# Patient Record
Sex: MALE | Race: WHITE | NOT HISPANIC OR LATINO | Employment: OTHER | URBAN - NONMETROPOLITAN AREA
[De-identification: names, ages, dates, MRNs, and addresses within clinical notes are randomized per-mention and may not be internally consistent; named-entity substitution may affect disease eponyms.]

---

## 2017-01-23 RX ORDER — LISINOPRIL 10 MG/1
10 TABLET ORAL DAILY
COMMUNITY
End: 2017-11-26 | Stop reason: SDUPTHER

## 2017-01-23 RX ORDER — PRAVASTATIN SODIUM 20 MG
20 TABLET ORAL DAILY
COMMUNITY
End: 2017-01-31

## 2017-01-23 RX ORDER — OMEGA-3 FATTY ACIDS/FISH OIL 300-1000MG
CAPSULE ORAL
COMMUNITY
End: 2018-07-03

## 2017-01-23 RX ORDER — METOPROLOL TARTRATE 50 MG/1
50 TABLET, FILM COATED ORAL 2 TIMES DAILY
COMMUNITY
End: 2017-01-31 | Stop reason: DRUGHIGH

## 2017-01-23 RX ORDER — UBIDECARENONE 100 MG
100 CAPSULE ORAL DAILY
COMMUNITY

## 2017-01-23 RX ORDER — DIGOXIN 250 MCG
250 TABLET ORAL
COMMUNITY
End: 2017-01-31

## 2017-01-23 RX ORDER — FUROSEMIDE 20 MG/1
20 TABLET ORAL DAILY
COMMUNITY
End: 2017-01-31 | Stop reason: DRUGHIGH

## 2017-01-23 RX ORDER — MELATONIN
1000 DAILY
COMMUNITY
End: 2017-01-31

## 2017-01-31 ENCOUNTER — OFFICE VISIT (OUTPATIENT)
Dept: CARDIOLOGY | Facility: CLINIC | Age: 82
End: 2017-01-31

## 2017-01-31 VITALS
SYSTOLIC BLOOD PRESSURE: 130 MMHG | HEIGHT: 74 IN | HEART RATE: 71 BPM | BODY MASS INDEX: 26.31 KG/M2 | WEIGHT: 205 LBS | DIASTOLIC BLOOD PRESSURE: 70 MMHG

## 2017-01-31 DIAGNOSIS — I50.40 COMBINED SYSTOLIC AND DIASTOLIC CONGESTIVE HEART FAILURE, UNSPECIFIED CONGESTIVE HEART FAILURE CHRONICITY: Primary | ICD-10-CM

## 2017-01-31 DIAGNOSIS — I10 ESSENTIAL HYPERTENSION: ICD-10-CM

## 2017-01-31 DIAGNOSIS — I25.10 CORONARY ARTERY DISEASE INVOLVING NATIVE CORONARY ARTERY OF NATIVE HEART WITHOUT ANGINA PECTORIS: ICD-10-CM

## 2017-01-31 DIAGNOSIS — I48.20 CHRONIC ATRIAL FIBRILLATION (HCC): ICD-10-CM

## 2017-01-31 DIAGNOSIS — E78.49 OTHER HYPERLIPIDEMIA: ICD-10-CM

## 2017-01-31 PROCEDURE — 80053 COMPREHEN METABOLIC PANEL: CPT | Performed by: FAMILY MEDICINE

## 2017-01-31 PROCEDURE — 80061 LIPID PANEL: CPT | Performed by: FAMILY MEDICINE

## 2017-01-31 PROCEDURE — 99212 OFFICE O/P EST SF 10 MIN: CPT | Performed by: INTERNAL MEDICINE

## 2017-01-31 RX ORDER — METOPROLOL TARTRATE 75 MG/1
75 TABLET, FILM COATED ORAL 2 TIMES DAILY
Status: ON HOLD | COMMUNITY
End: 2020-01-01

## 2017-01-31 RX ORDER — FUROSEMIDE 40 MG/1
10 TABLET ORAL DAILY
Status: ON HOLD | COMMUNITY
End: 2020-01-01

## 2017-01-31 RX ORDER — ACETAMINOPHEN 325 MG/1
650 TABLET ORAL EVERY 6 HOURS PRN
COMMUNITY
End: 2020-01-01

## 2017-01-31 NOTE — PROGRESS NOTES
"Subjective    Donnell Cain is a 83 y.o. male.  - fu of afib and ihd and cmo    History of Present Illness     IHD:  No angina and very active for his age. No unusual SOB. Tolerates meds ok. Had CABGX3 '01.    AFIB:  \"That pacer keeps my rhythm perfect\". No bleeding probs with NOAC and is taking correctly. Good stamina without palpitations.Pacer checks ok.    ISC CMO:  Previous RCA and LAD dist MI.  Last ECHO EF '13 was poor but now stamina is good. Has excessive diuresis after Lasix but goes frequently but small amounts - suspect degree of KLEIN    HLD:  Is off statin now and doesn't know why. Needs to ask PCP about this.    The following portions of the patient's history were reviewed and updated as appropriate: allergies, current medications, past family history, past medical history, past social history, past surgical history and problem list.    Patient Active Problem List   Diagnosis   • CAD (coronary artery disease)   • Combined systolic and diastolic congestive heart failure   • SSS (sick sinus syndrome)   • A-fib   • HLD (hyperlipidemia)   • HTN (hypertension)       Allergies   Allergen Reactions   • Barbiturates    • Crestor [Rosuvastatin Calcium]    • Lipitor [Atorvastatin]        Family History   Problem Relation Age of Onset   • Heart disease Brother        Social History     Social History   • Marital status:      Spouse name: N/A   • Number of children: N/A   • Years of education: N/A     Occupational History   • Not on file.     Social History Main Topics   • Smoking status: Former Smoker     Types: Cigarettes     Quit date: 1971   • Smokeless tobacco: Never Used      Comment: Quit smoking age 40   • Alcohol use No   • Drug use: No   • Sexual activity: Defer     Other Topics Concern   • Not on file     Social History Narrative         Current Outpatient Prescriptions:   •  acetaminophen (TYLENOL) 325 MG tablet, Take 650 mg by mouth Every 6 (Six) Hours As Needed for mild pain (1-3)., Disp: , " "Rfl:   •  coenzyme Q10 100 MG capsule, Take 100 mg by mouth Daily., Disp: , Rfl:   •  furosemide (LASIX) 40 MG tablet, Take 40 mg by mouth 2 (Two) Times a Day., Disp: , Rfl:   •  lisinopril (PRINIVIL,ZESTRIL) 10 MG tablet, Take 10 mg by mouth Daily., Disp: , Rfl:   •  Metoprolol Tartrate 75 MG tablet, Take 75 mg by mouth., Disp: , Rfl:   •  Multiple Vitamins-Minerals (MULTIVITAMIN ADULT PO), Take  by mouth., Disp: , Rfl:   •  Omega 3 1000 MG capsule, Take  by mouth., Disp: , Rfl:   •  rivaroxaban (XARELTO) 20 MG tablet, TAKE 1 TABLET DAILY, Disp: , Rfl:     Past Surgical History   Procedure Laterality Date   • Cardiac pacemaker placement     • Tonsillectomy     • Coronary artery bypass graft     • Appendectomy     • Pacemaker implantation       x 3        Review of Systems   Respiratory: Negative for apnea, chest tightness and shortness of breath.    Cardiovascular: Negative for chest pain, palpitations and leg swelling.   Gastrointestinal: Negative for abdominal pain.   Genitourinary: Positive for frequency. Negative for dysuria.   Musculoskeletal: Negative for myalgias.   Neurological: Negative for weakness and light-headedness.   Psychiatric/Behavioral: Negative for sleep disturbance.       Visit Vitals   • /70 (BP Location: Right arm, Patient Position: Sitting, Cuff Size: Adult)   • Pulse 71   • Ht 74\" (188 cm)   • Wt 205 lb (93 kg)   • BMI 26.32 kg/m2     Procedures    Objective   Physical Exam   Constitutional: He is oriented to person, place, and time. He appears well-developed and well-nourished.   HENT:   Head: Normocephalic and atraumatic.   Eyes: EOM are normal. Pupils are equal, round, and reactive to light.   Cardiovascular: Normal rate, regular rhythm, normal heart sounds and intact distal pulses.  Exam reveals no gallop and no friction rub.    No murmur heard.  Pulmonary/Chest: Effort normal and breath sounds normal. No respiratory distress. He has no wheezes. He has no rales.   Musculoskeletal: " He exhibits no edema or tenderness.   Neurological: He is alert and oriented to person, place, and time.   Skin: Skin is warm and dry.   Psychiatric: He has a normal mood and affect.       Assessment/Plan   Donnell was seen today for atrial fibrillation and coronary artery disease.    Diagnoses and all orders for this visit:    Combined systolic and diastolic congestive heart failure, unspecified congestive heart failure chronicity  Comments:  well compensated. Class 2c    Chronic atrial fibrillation  Comments:   ok. NOAC ok. CPT    Other hyperlipidemia  Comments:  will discuss with pcp the rationale of dc of statin    Essential hypertension  Comments:  controlled - cpt    Coronary artery disease involving native coronary artery of native heart without angina pectoris  Comments:  No angina - cpt                 Return in about 18 months (around 7/31/2018).  No orders of the defined types were placed in this encounter.

## 2017-01-31 NOTE — MR AVS SNAPSHOT
Donnell Cain   1/31/2017 10:30 AM   Office Visit    Dept Phone:  [phone]   Encounter #:  [id number]    Provider:  Tramaine Mckeon MD   Department:  [facility]                Your Full Care Plan              Today's Medication Changes          These changes are accurate as of: 1/31/17 10:36 AM.  If you have any questions, ask your nurse or doctor.               Medication(s)that have changed:     furosemide 40 MG tablet   Commonly known as:  LASIX   Take 40 mg by mouth 2 (Two) Times a Day.   What changed:  Another medication with the same name was removed. Continue taking this medication, and follow the directions you see here.   Changed by:  Tramaine Mckeon MD       Metoprolol Tartrate 75 MG tablet   Take 75 mg by mouth.   What changed:  Another medication with the same name was removed. Continue taking this medication, and follow the directions you see here.   Changed by:  Tramaine Mckeon MD         Stop taking medication(s)listed here:     cholecalciferol 1000 UNITS tablet   Commonly known as:  VITAMIN D3   Stopped by:  Tramaine Mckeon MD           digoxin 250 MCG tablet   Commonly known as:  LANOXIN   Stopped by:  Tramaine Mckeon MD           pravastatin 20 MG tablet   Commonly known as:  PRAVACHOL   Stopped by:  Tramaine Mckeon MD                      Your Updated Medication List          This list is accurate as of: 1/31/17 10:36 AM.  Always use your most recent med list.                acetaminophen 325 MG tablet   Commonly known as:  TYLENOL       coenzyme Q10 100 MG capsule       furosemide 40 MG tablet   Commonly known as:  LASIX       lisinopril 10 MG tablet   Commonly known as:  PRINIVIL,ZESTRIL       Metoprolol Tartrate 75 MG tablet       MULTIVITAMIN ADULT PO       Omega 3 1000 MG capsule       XARELTO 20 MG tablet   Generic drug:  rivaroxaban               You Were Diagnosed With        Codes  Comments    Diastolic congestive heart failure, unspecified congestive heart failure chronicity    -  Primary ICD-10-CM: I50.30  ICD-9-CM: 428.30, 428.0     Chronic atrial fibrillation     ICD-10-CM: I48.2  ICD-9-CM: 427.31     Other hyperlipidemia     ICD-10-CM: E78.4  ICD-9-CM: 272.4     Essential hypertension     ICD-10-CM: I10  ICD-9-CM: 401.9       Instructions     None    Patient Instructions History      Upcoming Appointments     Visit Type Date Time Department    FOLLOW UP 2017 10:30 AM [facility]    PACEMAKER CHECK 2017 10:30 AM [facility]      MyChart Signup     [employer] allows you to send messages to your doctor, view your test results, renew your prescriptions, schedule appointments, and more. To sign up, go to [employer] and click on the Sign Up Now link in the New User? box. Enter your [employer] Activation Code exactly as it appears below along with the last four digits of your Social Security Number and your Date of Birth () to complete the sign-up process. If you do not sign up before the expiration date, you must request a new code.    [employer] Activation Code: [email or web address]  Expires: 2017 10:35 AM    If you have questions, you can email [email or web address] or call [phone] to talk to our [employer] staff. Remember, [employer] is NOT to be used for urgent needs. For medical emergencies, dial 911.               Other Info from Your Visit           Your Appointments     2017 10:30 AM CDT   PACEMAKER CHECK with PACEMAKER HEART GRP KYLE   [facility] (--)    [address]  [place] [address]   [phone]            2018 10:00 AM CDT   Follow Up with Tramaine Mckeon MD   [facility] (--)    [address]  [place] [address]   [phone]           Arrive 15 minutes prior to appointment.              Allergies      "Barbiturates      Crestor [Rosuvastatin Calcium]      Lipitor [Atorvastatin]        Reason for Visit     Atrial Fibrillation Year Follow Up     Coronary Artery Disease           Vital Signs     Blood Pressure Pulse Height Weight Body Mass Index Smoking Status    130/70 (BP Location: Right arm, Patient Position: Sitting, Cuff Size: Adult) 71 74\" (188 cm) 205 lb (93 kg) 26.32 kg/m2 Former Smoker      Problems and Diagnoses Noted     Atrial fibrillation (irregular heartbeat)    Heart failure    High cholesterol or triglycerides    High blood pressure        "

## 2017-03-06 RX ORDER — RIVAROXABAN 20 MG/1
TABLET, FILM COATED ORAL
Qty: 90 TABLET | Refills: 2 | Status: SHIPPED | OUTPATIENT
Start: 2017-03-06 | End: 2017-12-22 | Stop reason: SDUPTHER

## 2017-06-20 ENCOUNTER — CLINICAL SUPPORT (OUTPATIENT)
Dept: CARDIOLOGY | Facility: CLINIC | Age: 82
End: 2017-06-20

## 2017-06-20 DIAGNOSIS — I48.20 CHRONIC ATRIAL FIBRILLATION (HCC): ICD-10-CM

## 2017-06-20 DIAGNOSIS — Z95.0 CARDIAC PACEMAKER IN SITU: Primary | ICD-10-CM

## 2017-06-20 PROCEDURE — 93288 INTERROG EVL PM/LDLS PM IP: CPT | Performed by: INTERNAL MEDICINE

## 2017-06-20 NOTE — PROGRESS NOTES
Single Chamber Ventricular Pacemaker Evaluation Report  In office    June 20, 2017    Primary Cardiologist: Mike  : Guidant Model: Advantio  Implant date: November 5, 2014    Reason for evaluation: routine  Indication for pacemaker: chronic a-fib    Measurements  Ventricular sensing - R wave: 24.2 mV  Ventricular threshold: 0.7 V @ 0.4 ms  Ventricular lead impedance: 773 ohms      Diagnostic Data  Paced: 98 %  Other: 1 ventricular high rate- 1 second long, max v rate 215 bpm   meds include xarelto  Battery status: satisfactory    Est 8.5 years       Final Parameters  Mode: VVIR     Lower rate: 70 bpm    Ventricular - Amplitude: 1.2 V Pulse width: 0.4 ms Sensitivity: 2.5 mV   Changes made: none  Conclusions: normal pacemaker function and stable pacing and sensing thresholds    Follow up: 6 months

## 2017-11-28 RX ORDER — LISINOPRIL 10 MG/1
TABLET ORAL
Qty: 90 TABLET | Refills: 0 | Status: SHIPPED | OUTPATIENT
Start: 2017-11-28 | End: 2018-02-25 | Stop reason: SDUPTHER

## 2017-11-28 RX ORDER — FUROSEMIDE 20 MG/1
TABLET ORAL
Qty: 90 TABLET | Refills: 0 | Status: SHIPPED | OUTPATIENT
Start: 2017-11-28 | End: 2018-02-25 | Stop reason: SDUPTHER

## 2017-12-01 RX ORDER — RIVAROXABAN 20 MG/1
TABLET, FILM COATED ORAL
Qty: 90 TABLET | Refills: 2 | OUTPATIENT
Start: 2017-12-01

## 2017-12-19 ENCOUNTER — CLINICAL SUPPORT (OUTPATIENT)
Dept: CARDIOLOGY | Facility: CLINIC | Age: 82
End: 2017-12-19

## 2017-12-19 DIAGNOSIS — I49.5 SSS (SICK SINUS SYNDROME) (HCC): ICD-10-CM

## 2017-12-19 PROCEDURE — 93288 INTERROG EVL PM/LDLS PM IP: CPT | Performed by: PHYSICIAN ASSISTANT

## 2018-01-02 NOTE — PROGRESS NOTES
Single Chamber Ventricular Pacemaker Evaluation Report  Clinic Interrogation    January 1, 2018    Primary Cardiologist: Mike  : Guidant Model: Advantio  Implant date: 11/5/14    Reason for evaluation: routine  Indication for pacemaker: chronic a-fib    Measurements  Ventricular sensing - R wave: n/r  Ventricular threshold: 0.7 V @ 0.4 ms  Ventricular lead impedance: 760 ohms      Diagnostic Data  Paced: 99 %  Other: none noted  Battery status: satisfactory           Final Parameters  Mode: VVIR     Lower rate: 70 bpm    Ventricular - Amplitude: 1.2 V Pulse width: 0.4 ms Sensitivity: 2.5 mV   Changes made: none  Conclusions: normal pacemaker function    Follow up: 6 months

## 2018-02-27 RX ORDER — LISINOPRIL 10 MG/1
TABLET ORAL
Qty: 90 TABLET | Refills: 1 | Status: SHIPPED | OUTPATIENT
Start: 2018-02-27 | End: 2018-08-24 | Stop reason: SDUPTHER

## 2018-02-27 RX ORDER — FUROSEMIDE 20 MG/1
TABLET ORAL
Qty: 90 TABLET | Refills: 1 | Status: SHIPPED | OUTPATIENT
Start: 2018-02-27 | End: 2018-07-03

## 2018-06-19 ENCOUNTER — CLINICAL SUPPORT NO REQUIREMENTS (OUTPATIENT)
Dept: CARDIOLOGY | Facility: CLINIC | Age: 83
End: 2018-06-19

## 2018-06-19 DIAGNOSIS — I49.5 SSS (SICK SINUS SYNDROME) (HCC): ICD-10-CM

## 2018-06-19 DIAGNOSIS — Z95.0 PACEMAKER: Primary | ICD-10-CM

## 2018-06-19 PROCEDURE — 93288 INTERROG EVL PM/LDLS PM IP: CPT | Performed by: INTERNAL MEDICINE

## 2018-07-03 ENCOUNTER — OFFICE VISIT (OUTPATIENT)
Dept: CARDIOLOGY | Facility: CLINIC | Age: 83
End: 2018-07-03

## 2018-07-03 VITALS
WEIGHT: 203 LBS | HEART RATE: 71 BPM | HEIGHT: 74 IN | BODY MASS INDEX: 26.05 KG/M2 | DIASTOLIC BLOOD PRESSURE: 78 MMHG | SYSTOLIC BLOOD PRESSURE: 123 MMHG

## 2018-07-03 DIAGNOSIS — R01.1 MURMUR: ICD-10-CM

## 2018-07-03 DIAGNOSIS — I48.20 CHRONIC ATRIAL FIBRILLATION (HCC): ICD-10-CM

## 2018-07-03 DIAGNOSIS — E78.49 OTHER HYPERLIPIDEMIA: ICD-10-CM

## 2018-07-03 DIAGNOSIS — I50.40 COMBINED SYSTOLIC AND DIASTOLIC CONGESTIVE HEART FAILURE, UNSPECIFIED CONGESTIVE HEART FAILURE CHRONICITY: ICD-10-CM

## 2018-07-03 DIAGNOSIS — I10 ESSENTIAL HYPERTENSION: ICD-10-CM

## 2018-07-03 DIAGNOSIS — I25.10 CORONARY ARTERY DISEASE INVOLVING NATIVE CORONARY ARTERY OF NATIVE HEART WITHOUT ANGINA PECTORIS: Primary | ICD-10-CM

## 2018-07-03 PROCEDURE — 93000 ELECTROCARDIOGRAM COMPLETE: CPT | Performed by: INTERNAL MEDICINE

## 2018-07-03 PROCEDURE — 99214 OFFICE O/P EST MOD 30 MIN: CPT | Performed by: INTERNAL MEDICINE

## 2018-07-03 NOTE — PROGRESS NOTES
Subjective    Donnell Cain is a 85 y.o. male. FU of rhythm, ihd and cmo    History of Present Illness     AFIB WITH PERM PACER:  Has no palpitations or sob and has good stamina. Is compliant with meds that are well tolerated and no bleeding probs on NOAC. EKG is afib with 100% v-pacing and nsc. All pacer checks show good funxn. Has a skin cancer on his leg and is going to be eval'd by a [facility] dermatologist soon. Its low risk to interrupt the Xarelto for this procedure.    IHD:  Is very active and has good stamina and never has cp. Is statin intol.    HTN:  Gets good office checks and meds are well tolerated    ISCH CMO:  Has no edema or unusual sob and stamina is stable and good. No palpitations.        The following portions of the patient's history were reviewed and updated as appropriate: allergies, current medications, past family history, past medical history, past social history, past surgical history and problem list.    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)   • SSS (sick sinus syndrome) (CMS/HCC)   • A-fib (CMS/HCC)   • HLD (hyperlipidemia)   • HTN (hypertension)   • Murmur       Allergies   Allergen Reactions   • Barbiturates    • Crestor [Rosuvastatin Calcium]    • Lipitor [Atorvastatin]        Family History   Problem Relation Age of Onset   • Heart disease Brother        Social History     Social History   • Marital status:      Spouse name: N/A   • Number of children: N/A   • Years of education: N/A     Occupational History   • Not on file.     Social History Main Topics   • Smoking status: Former Smoker     Types: Cigarettes     Quit date: 1971   • Smokeless tobacco: Never Used      Comment: Quit smoking age 40   • Alcohol use No   • Drug use: No   • Sexual activity: Defer     Other Topics Concern   • Not on file     Social History Narrative   • No narrative on file         Current  "Outpatient Prescriptions:   •  acetaminophen (TYLENOL) 325 MG tablet, Take 650 mg by mouth Every 6 (Six) Hours As Needed for mild pain (1-3)., Disp: , Rfl:   •  coenzyme Q10 100 MG capsule, Take 100 mg by mouth Daily., Disp: , Rfl:   •  furosemide (LASIX) 40 MG tablet, Take 10 mg by mouth Daily., Disp: , Rfl:   •  lisinopril (PRINIVIL,ZESTRIL) 10 MG tablet, TAKE 1 TABLET DAILY, Disp: 90 tablet, Rfl: 1  •  Metoprolol Tartrate 75 MG tablet, Take 75 mg by mouth., Disp: , Rfl:   •  Multiple Vitamins-Minerals (MULTIVITAMIN ADULT PO), Take  by mouth., Disp: , Rfl:   •  rivaroxaban (XARELTO) 20 MG tablet, Take 1 tablet by mouth Daily With Dinner., Disp: 90 tablet, Rfl: 3    Past Surgical History:   Procedure Laterality Date   • APPENDECTOMY     • CARDIAC PACEMAKER PLACEMENT     • CORONARY ARTERY BYPASS GRAFT     • PACEMAKER IMPLANTATION      x 3    • TONSILLECTOMY         Review of Systems   Constitutional: Negative for fatigue, fever and unexpected weight change.   Respiratory: Negative for apnea, chest tightness and shortness of breath.    Cardiovascular: Negative for chest pain, palpitations and leg swelling.   Gastrointestinal: Negative for abdominal pain and blood in stool.   Genitourinary: Negative for dysuria and hematuria.   Musculoskeletal: Negative for myalgias.   Neurological: Negative for weakness and light-headedness.   Hematological: Bruises/bleeds easily.   Psychiatric/Behavioral: Positive for sleep disturbance.       /78   Pulse 71   Ht 188 cm (74\")   Wt 92.1 kg (203 lb)   BMI 26.06 kg/m²   Procedures    Objective   Physical Exam   Constitutional: He is oriented to person, place, and time. He appears well-developed and well-nourished. No distress.   HENT:   Head: Normocephalic.   Eyes: Pupils are equal, round, and reactive to light.   Neck: No thyromegaly present.   Cardiovascular: Normal rate, regular rhythm and intact distal pulses.  Exam reveals no gallop and no friction rub.    Murmur heard.   " Diastolic murmur is present with a grade of 1/6   At base   Pulmonary/Chest: Effort normal and breath sounds normal. No respiratory distress. He has no wheezes. He has no rales.   Abdominal: Soft. Bowel sounds are normal. He exhibits no distension. There is no tenderness. There is no guarding.   Musculoskeletal: He exhibits no edema or tenderness.   Neurological: He is alert and oriented to person, place, and time.   Skin: Skin is warm and dry. He is not diaphoretic.   Lesion on right calf    Psychiatric: He has a normal mood and affect.       Assessment/Plan   Donnell was seen today for congestive heart failure, coronary artery disease, atrial fibrillation and surgery risk assessment.    Diagnoses and all orders for this visit:    Coronary artery disease involving native coronary artery of native heart without angina pectoris  Comments:  NO ANGINA  Orders:  -     ECG 12 Lead    Combined systolic and diastolic congestive heart failure, unspecified congestive heart failure chronicity (CMS/HCC)  Comments:  CL 2C HFrEF - CPT    Chronic atrial fibrillation (CMS/HCC)  Comments:  100% V-PACED AND ASYMPTOMATIC    Other hyperlipidemia  Comments:  INTOL TO STATINS    Essential hypertension  Comments:  GOOD CONTROL    Murmur  Comments:  NEW DIASTOLIC MURMUR - CHECK ECHO  Orders:  -     Adult Transthoracic Echo Complete W/ Cont if Necessary Per Protocol                 Return in about 1 year (around 7/3/2019) for Next scheduled follow up.  Orders Placed This Encounter   Procedures   • ECG 12 Lead     Order Specific Question:   Reason for Exam:     Answer:   afib   • Adult Transthoracic Echo Complete W/ Cont if Necessary Per Protocol     Order Specific Question:   Reason for exam?     Answer:   Murmur or Click     Order Specific Question:   Murmur or Click specification?     Answer:   Suspicion of Valvular Heart Disease

## 2018-07-05 NOTE — PROGRESS NOTES
Single Chamber Ventricular Pacemaker Evaluation Report  IN OFFICE INTERROGATION    June 19, 2018    Primary Cardiologist: Mike  : Guidant Model: Advantio [id number]  Implant date: 11/05/2014    Reason for evaluation: routine  Indication for pacemaker: chronic a-fib    Measurements  Ventricular sensing - R wave: >25 mV  Ventricular threshold: 0.7 V @ 0.4 ms  Ventricular lead impedance: 754 ohms      Diagnostic Data  Paced: 99 %    Episodes recorded since 12/19/2017  5 VHR episodes:  Longest duration approximately 9 seconds, average ventricular rates range from 143-165 bpm.  Probable AF with RVR.  Hx of chronic AF; anticoagulated with Xarelto      Battery status: satisfactory, estimated 7.5 years remaining       Final Parameters  Mode: VVIR     Lower rate: 70 bpm    Ventricular - Amplitude: Auto 1.2 V Pulse width: 0.4 ms Sensitivity: 2.5 mV   Changes made: No changes made.  Medication list updated by RN.  Conclusions: normal pacemaker function, stable pacing and sensing thresholds and adequate battery reserve    Follow up: 6 months

## 2018-07-13 ENCOUNTER — HOSPITAL ENCOUNTER (OUTPATIENT)
Dept: CARDIOLOGY | Facility: HOSPITAL | Age: 83
Discharge: HOME | End: 2018-07-13
Attending: INTERNAL MEDICINE | Admitting: INTERNAL MEDICINE

## 2018-07-13 VITALS
WEIGHT: 203.04 LBS | SYSTOLIC BLOOD PRESSURE: 116 MMHG | DIASTOLIC BLOOD PRESSURE: 58 MMHG | BODY MASS INDEX: 26.06 KG/M2 | HEIGHT: 74 IN

## 2018-07-13 LAB
BH CV ECHO MEAS - AO MAX PG (FULL): 2.8 MMHG
BH CV ECHO MEAS - AO MAX PG: 5.3 MMHG
BH CV ECHO MEAS - AO MEAN PG (FULL): 2 MMHG
BH CV ECHO MEAS - AO MEAN PG: 3 MMHG
BH CV ECHO MEAS - AO ROOT AREA (BSA CORRECTED): 1.6
BH CV ECHO MEAS - AO ROOT AREA: 10.2 CM^2
BH CV ECHO MEAS - AO ROOT DIAM: 3.6 CM
BH CV ECHO MEAS - AO V2 MAX: 115 CM/SEC
BH CV ECHO MEAS - AO V2 MEAN: 71.4 CM/SEC
BH CV ECHO MEAS - AO V2 VTI: 23.1 CM
BH CV ECHO MEAS - AVA(I,A): 2 CM^2
BH CV ECHO MEAS - AVA(I,D): 2 CM^2
BH CV ECHO MEAS - AVA(V,A): 1.9 CM^2
BH CV ECHO MEAS - AVA(V,D): 1.9 CM^2
BH CV ECHO MEAS - BSA(HAYCOCK): 2.2 M^2
BH CV ECHO MEAS - BSA: 2.2 M^2
BH CV ECHO MEAS - BZI_BMI: 26.1 KILOGRAMS/M^2
BH CV ECHO MEAS - BZI_METRIC_HEIGHT: 188 CM
BH CV ECHO MEAS - BZI_METRIC_WEIGHT: 92.1 KG
BH CV ECHO MEAS - CONTRAST EF 4CH: 45 ML/M^2
BH CV ECHO MEAS - EDV(CUBED): 117.6 ML
BH CV ECHO MEAS - EDV(MOD-SP4): 117 ML
BH CV ECHO MEAS - EDV(TEICH): 112.8 ML
BH CV ECHO MEAS - EF(CUBED): 53.4 %
BH CV ECHO MEAS - EF(MOD-BP): 45 %
BH CV ECHO MEAS - EF(MOD-SP4): 45 %
BH CV ECHO MEAS - EF(TEICH): 45.1 %
BH CV ECHO MEAS - ESV(CUBED): 54.9 ML
BH CV ECHO MEAS - ESV(MOD-SP4): 64.4 ML
BH CV ECHO MEAS - ESV(TEICH): 62 ML
BH CV ECHO MEAS - FS: 22.4 %
BH CV ECHO MEAS - IVS/LVPW: 1.7
BH CV ECHO MEAS - IVSD: 1.1 CM
BH CV ECHO MEAS - LA DIMENSION: 4.6 CM
BH CV ECHO MEAS - LA/AO: 1.3
BH CV ECHO MEAS - LAT PEAK E' VEL: 10.1 CM/SEC
BH CV ECHO MEAS - LV DIASTOLIC VOL/BSA (35-75): 53.5 ML/M^2
BH CV ECHO MEAS - LV MASS(C)D: 312.9 GRAMS
BH CV ECHO MEAS - LV MASS(C)DI: 143.1 GRAMS/M^2
BH CV ECHO MEAS - LV MAX PG: 2.5 MMHG
BH CV ECHO MEAS - LV MEAN PG: 1 MMHG
BH CV ECHO MEAS - LV SYSTOLIC VOL/BSA (12-30): 29.4 ML/M^2
BH CV ECHO MEAS - LV V1 MAX: 78.3 CM/SEC
BH CV ECHO MEAS - LV V1 MEAN: 53.7 CM/SEC
BH CV ECHO MEAS - LV V1 VTI: 16.1 CM
BH CV ECHO MEAS - LVIDD: 4.9 CM
BH CV ECHO MEAS - LVIDS: 3.8 CM
BH CV ECHO MEAS - LVLD AP4: 8.4 CM
BH CV ECHO MEAS - LVLS AP4: 8 CM
BH CV ECHO MEAS - LVOT AREA (M): 2.8 CM^2
BH CV ECHO MEAS - LVOT AREA: 2.8 CM^2
BH CV ECHO MEAS - LVOT DIAM: 1.9 CM
BH CV ECHO MEAS - LVPWD: 1.1 CM
BH CV ECHO MEAS - MED PEAK E' VEL: 7.51 CM/SEC
BH CV ECHO MEAS - MR MAX PG: 87.6 MMHG
BH CV ECHO MEAS - MR MAX VEL: 468 CM/SEC
BH CV ECHO MEAS - MR MEAN PG: 62 MMHG
BH CV ECHO MEAS - MR MEAN VEL: 373 CM/SEC
BH CV ECHO MEAS - MR PISA RADIUS: 0.3 CM
BH CV ECHO MEAS - MR PISA: 0.57 CM^2
BH CV ECHO MEAS - MR VTI: 176 CM
BH CV ECHO MEAS - MV DEC TIME: 0.17 SEC
BH CV ECHO MEAS - MV E MAX VEL: 111 CM/SEC
BH CV ECHO MEAS - RAP SYSTOLE: 5 MMHG
BH CV ECHO MEAS - RVSP: 30.6 MMHG
BH CV ECHO MEAS - SI(AO): 107.5 ML/M^2
BH CV ECHO MEAS - SI(CUBED): 28.7 ML/M^2
BH CV ECHO MEAS - SI(LVOT): 20.9 ML/M^2
BH CV ECHO MEAS - SI(MOD-SP4): 24.1 ML/M^2
BH CV ECHO MEAS - SI(TEICH): 23.3 ML/M^2
BH CV ECHO MEAS - SV(AO): 235.1 ML
BH CV ECHO MEAS - SV(CUBED): 62.8 ML
BH CV ECHO MEAS - SV(LVOT): 45.6 ML
BH CV ECHO MEAS - SV(MOD-SP4): 52.6 ML
BH CV ECHO MEAS - SV(TEICH): 50.9 ML
BH CV ECHO MEAS - TR MAX VEL: 253 CM/SEC
BH CV ECHO MEASUREMENTS AVERAGE E/E' RATIO: 12.61
LEFT ATRIUM VOLUME INDEX: 58.4 ML/M2
LEFT ATRIUM VOLUME: 128 CM3
MAXIMAL PREDICTED HEART RATE: 135 BPM
STRESS TARGET HR: 115 BPM

## 2018-07-13 PROCEDURE — 93306 TTE W/DOPPLER COMPLETE: CPT | Performed by: INTERNAL MEDICINE

## 2018-07-13 PROCEDURE — 93306 TTE W/DOPPLER COMPLETE: CPT

## 2018-08-16 RX ORDER — METOPROLOL TARTRATE 75 MG/1
75 TABLET, FILM COATED ORAL 2 TIMES DAILY
Qty: 180 TABLET | Refills: 3 | Status: ON HOLD | OUTPATIENT
Start: 2018-08-16 | End: 2020-01-01

## 2018-08-24 ENCOUNTER — TELEPHONE (OUTPATIENT)
Dept: CARDIOLOGY | Facility: CLINIC | Age: 83
End: 2018-08-24

## 2018-08-28 RX ORDER — FUROSEMIDE 20 MG/1
TABLET ORAL
Qty: 90 TABLET | Refills: 3 | Status: SHIPPED | OUTPATIENT
Start: 2018-08-28 | End: 2019-08-19 | Stop reason: SDUPTHER

## 2018-08-28 RX ORDER — LISINOPRIL 10 MG/1
TABLET ORAL
Qty: 90 TABLET | Refills: 3 | Status: SHIPPED | OUTPATIENT
Start: 2018-08-28 | End: 2019-08-19 | Stop reason: SDUPTHER

## 2018-09-19 ENCOUNTER — TELEPHONE (OUTPATIENT)
Dept: CARDIOLOGY | Facility: CLINIC | Age: 83
End: 2018-09-19

## 2018-09-19 NOTE — TELEPHONE ENCOUNTER
Patient has called in today stating he is to have basal cell CA cut off his right leg on 10/09, they are wanting to stop his blood thinner for 3 days prior. Please advise.

## 2018-09-20 ENCOUNTER — TELEPHONE (OUTPATIENT)
Dept: CARDIOLOGY | Facility: CLINIC | Age: 83
End: 2018-09-20

## 2018-09-20 NOTE — TELEPHONE ENCOUNTER
Patient notified and will come  letter at    Stable and controlled. Continue current treatment plan as previously prescribed with your PCP.   Currently in a study for PVD

## 2018-12-18 RX ORDER — RIVAROXABAN 20 MG/1
TABLET, FILM COATED ORAL
Qty: 90 TABLET | Refills: 2 | Status: SHIPPED | OUTPATIENT
Start: 2018-12-18 | End: 2019-09-15 | Stop reason: SDUPTHER

## 2019-01-08 ENCOUNTER — CLINICAL SUPPORT NO REQUIREMENTS (OUTPATIENT)
Dept: CARDIOLOGY | Facility: CLINIC | Age: 84
End: 2019-01-08

## 2019-01-08 DIAGNOSIS — I48.20 CHRONIC ATRIAL FIBRILLATION (HCC): ICD-10-CM

## 2019-01-08 DIAGNOSIS — Z95.0 PACEMAKER: Primary | ICD-10-CM

## 2019-01-08 PROCEDURE — 93288 INTERROG EVL PM/LDLS PM IP: CPT | Performed by: INTERNAL MEDICINE

## 2019-01-08 NOTE — PROGRESS NOTES
Single Chamber Ventricular Pacemaker Evaluation Report  IN OFFICE INTERROGATION    January 8, 2019    Primary Cardiologist: Mike  : Guidant Model: ADVANTIO [id number]  Implant date: 11/5/2014    Reason for evaluation: routine  Indication for pacemaker: chronic a-fib    Measurements  Ventricular sensing - R wave: 13.3 mV  Ventricular threshold: 0.8 V @ 0.4 ms  Ventricular lead impedance: 706 ohms      Diagnostic Data  Paced: 97 %    Episodes recorded since 6/19/2018:  Hx of Chronic AF; anticoagulated with Xarelto.  5 ventricular high rates: longest duration 9 seconds, rates 144-176 bpm.    Battery status: satisfactory, estimated 7 years remaining       Final Parameters  Mode: VVIR     Lower rate: 70 bpm    Ventricular - Amplitude: 1.3 V Pulse width: 0.4 ms Sensitivity: 2.5 mV   Changes made: No changes made.  Conclusions: normal pacemaker function, stable pacing and sensing thresholds and adequate battery reserve    Follow up: 6 months in office

## 2019-07-01 ENCOUNTER — TELEPHONE (OUTPATIENT)
Dept: CARDIOLOGY | Facility: CLINIC | Age: 84
End: 2019-07-01

## 2019-07-03 ENCOUNTER — CLINICAL SUPPORT NO REQUIREMENTS (OUTPATIENT)
Dept: CARDIOLOGY | Facility: CLINIC | Age: 84
End: 2019-07-03

## 2019-07-03 ENCOUNTER — OFFICE VISIT (OUTPATIENT)
Dept: CARDIOLOGY | Facility: CLINIC | Age: 84
End: 2019-07-03

## 2019-07-03 VITALS
WEIGHT: 193 LBS | BODY MASS INDEX: 24.77 KG/M2 | HEART RATE: 70 BPM | SYSTOLIC BLOOD PRESSURE: 113 MMHG | DIASTOLIC BLOOD PRESSURE: 62 MMHG | HEIGHT: 74 IN

## 2019-07-03 DIAGNOSIS — E78.49 OTHER HYPERLIPIDEMIA: ICD-10-CM

## 2019-07-03 DIAGNOSIS — I49.5 SSS (SICK SINUS SYNDROME) (HCC): ICD-10-CM

## 2019-07-03 DIAGNOSIS — Z95.0 PACEMAKER: Primary | ICD-10-CM

## 2019-07-03 DIAGNOSIS — I48.20 CHRONIC ATRIAL FIBRILLATION (HCC): ICD-10-CM

## 2019-07-03 DIAGNOSIS — I25.10 CORONARY ARTERY DISEASE INVOLVING NATIVE CORONARY ARTERY OF NATIVE HEART WITHOUT ANGINA PECTORIS: ICD-10-CM

## 2019-07-03 DIAGNOSIS — I25.10 CORONARY ARTERY DISEASE INVOLVING NATIVE CORONARY ARTERY OF NATIVE HEART WITHOUT ANGINA PECTORIS: Primary | ICD-10-CM

## 2019-07-03 DIAGNOSIS — I10 ESSENTIAL HYPERTENSION: ICD-10-CM

## 2019-07-03 DIAGNOSIS — I48.20 CHRONIC ATRIAL FIBRILLATION (HCC): Primary | ICD-10-CM

## 2019-07-03 DIAGNOSIS — I50.42 CHRONIC COMBINED SYSTOLIC AND DIASTOLIC CONGESTIVE HEART FAILURE (HCC): ICD-10-CM

## 2019-07-03 PROCEDURE — 93000 ELECTROCARDIOGRAM COMPLETE: CPT | Performed by: INTERNAL MEDICINE

## 2019-07-03 PROCEDURE — 99214 OFFICE O/P EST MOD 30 MIN: CPT | Performed by: INTERNAL MEDICINE

## 2019-07-03 PROCEDURE — 93288 INTERROG EVL PM/LDLS PM IP: CPT | Performed by: INTERNAL MEDICINE

## 2019-07-03 RX ORDER — PRAVASTATIN SODIUM 20 MG
20 TABLET ORAL DAILY
Qty: 90 TABLET | Refills: 11 | Status: SHIPPED | OUTPATIENT
Start: 2019-07-03 | End: 2020-01-01

## 2019-07-03 NOTE — PROGRESS NOTES
Single Chamber Ventricular Pacemaker Evaluation Report  IN OFFICE INTERROGATION    July 3, 2019    Primary Cardiologist: Mike  : Guidant Model: Advantio K062  Implant date: 11/5/2014    Reason for evaluation: routine  Indication for pacemaker: chronic a-fib and bradycardia    Measurements  Ventricular sensing - R wave: 21.9 mV  Ventricular threshold: 0.9 V @ 0.4 ms  Ventricular lead impedance: 701 ohms      Diagnostic Data  Paced: 98 %    Episodes recorded since 1/8/2019:  6 ventricular high rate episodes:  Longest duration 9 seconds, rates 4864834 bpm  Hx of Chronic AF; anticoagulated with Xarelto.    Battery status: satisfactory    Estimated 6 years remaining       Final Parameters  Mode: VVIR     Lower rate: 70 bpm    Ventricular - Amplitude: 1.4 V Pulse width: 0.4 ms Sensitivity: 2.5 mV   Changes made: No changes made.  Conclusions: normal pacemaker function, stable pacing and sensing thresholds and adequate battery reserve    Follow up: 6 months in office

## 2019-07-03 NOTE — PROGRESS NOTES
"Subjective    Donnell Cain is a 86 y.o. male. Fu of ihd, risks and rhythm    History of Present Illness     IHD:  Is active for his age and has no cp with or without exertion. Is compliant with meds. EKG is 100%  and nsc. Has no unusual sob. \"I have already lived a lot longer than I thought I would\"    HRmeEF:  Has no edema, unusual sob or noticeable decline in stamina. Takes his diuretic at 1500 and has nocturia then. However, he does not want to take the Lasix in the am because he wants to leave the house and not have urgency.    HTN:  Does not check at home but clinic checks are all good    HLD:  Has had rxns to potent statins in the past but he doesn't recall. He no longer has a pcp and plans to get one - last KBC=183    AFIB WITH PACER:  Has no palpitations or spells of sob or light-headedness. Pacer checks ok today with 98% v-pacing. Is on a NOAC with no bleeding probs x easy bruising. Has not had labs in over a year so will order those for today.        The following portions of the patient's history were reviewed and updated as appropriate: allergies, current medications, past family history, past medical history, past social history, past surgical history and problem list.    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)   • SSS (sick sinus syndrome) (CMS/HCC)   • A-fib (CMS/HCC)   • HLD (hyperlipidemia)   • HTN (hypertension)   • Murmur       Allergies   Allergen Reactions   • Barbiturates    • Crestor [Rosuvastatin Calcium]    • Lipitor [Atorvastatin]        Family History   Problem Relation Age of Onset   • Heart disease Brother        Social History     Socioeconomic History   • Marital status:      Spouse name: Not on file   • Number of children: Not on file   • Years of education: Not on file   • Highest education level: Not on file   Tobacco Use   • Smoking status: Former Smoker     Types: " Cigarettes     Last attempt to quit: 1971     Years since quittin.5   • Smokeless tobacco: Never Used   • Tobacco comment: Quit smoking age 40   Substance and Sexual Activity   • Alcohol use: No   • Drug use: No   • Sexual activity: Defer         Current Outpatient Medications:   •  acetaminophen (TYLENOL) 325 MG tablet, Take 650 mg by mouth Every 6 (Six) Hours As Needed for mild pain (1-3)., Disp: , Rfl:   •  coenzyme Q10 100 MG capsule, Take 100 mg by mouth Daily., Disp: , Rfl:   •  furosemide (LASIX) 20 MG tablet, TAKE 1 TABLET DAILY, Disp: 90 tablet, Rfl: 3  •  lisinopril (PRINIVIL,ZESTRIL) 10 MG tablet, TAKE 1 TABLET DAILY, Disp: 90 tablet, Rfl: 3  •  Metoprolol Tartrate 75 MG tablet, Take 75 mg by mouth 2 (Two) Times a Day., Disp: 180 tablet, Rfl: 3  •  Multiple Vitamins-Minerals (MULTIVITAMIN ADULT PO), Take  by mouth., Disp: , Rfl:   •  XARELTO 20 MG tablet, TAKE 1 TABLET DAILY WITH DINNER, Disp: 90 tablet, Rfl: 2  •  furosemide (LASIX) 40 MG tablet, Take 10 mg by mouth Daily., Disp: , Rfl:   •  Metoprolol Tartrate 75 MG tablet, Take 75 mg by mouth 2 (Two) Times a Day., Disp: , Rfl:   •  pravastatin (PRAVACHOL) 20 MG tablet, Take 1 tablet by mouth Daily., Disp: 90 tablet, Rfl: 11    Past Surgical History:   Procedure Laterality Date   • APPENDECTOMY     • CARDIAC PACEMAKER PLACEMENT     • CORONARY ARTERY BYPASS GRAFT     • PACEMAKER IMPLANTATION      x 3    • TONSILLECTOMY         Review of Systems   Constitutional: Negative for fatigue, fever and unexpected weight change.   Respiratory: Negative for apnea, chest tightness and shortness of breath.    Cardiovascular: Negative for chest pain, palpitations and leg swelling.   Gastrointestinal: Negative for abdominal pain.   Genitourinary: Negative for dysuria.   Musculoskeletal: Negative for myalgias.   Neurological: Positive for weakness. Negative for light-headedness.   Psychiatric/Behavioral: Positive for sleep disturbance.        Nocturia interrupts  "sleep       /62   Pulse 70   Ht 188 cm (74\")   Wt 87.5 kg (193 lb)   BMI 24.78 kg/m²   Procedures    Objective   Physical Exam   Constitutional: He is oriented to person, place, and time. No distress.   Elderly and managing well   HENT:   Head: Normocephalic.   Eyes: Pupils are equal, round, and reactive to light.   Neck: No thyromegaly present.   Cardiovascular: Normal rate, regular rhythm, normal heart sounds and intact distal pulses. Exam reveals no gallop and no friction rub.   No murmur heard.  Pulmonary/Chest: Effort normal and breath sounds normal. No stridor. No respiratory distress. He has no wheezes. He has no rales.   Abdominal: Soft. Bowel sounds are normal. He exhibits no distension and no mass. There is no tenderness. There is no guarding.   Musculoskeletal: He exhibits no edema or tenderness.   Neurological: He is alert and oriented to person, place, and time.   Skin: Skin is warm and dry. He is not diaphoretic.   Psychiatric: He has a normal mood and affect.       Assessment/Plan   Donnell was seen today for coronary artery disease, atrial fibrillation and congestive heart failure.    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    Essential hypertension  Comments:  good control                 Return in about 1 year (around 7/3/2020) for Next scheduled follow up.  Orders Placed This Encounter   Procedures   • Comprehensive Metabolic Panel "     Standing Status:   Future     Standing Expiration Date:   7/3/2020   • CBC (No Diff)     Standing Status:   Future     Standing Expiration Date:   7/3/2020   • ECG 12 Lead     Order Specific Question:   Reason for Exam:     Answer:   [email or web address]

## 2019-07-09 ENCOUNTER — LAB (OUTPATIENT)
Dept: LAB | Facility: HOSPITAL | Age: 84
End: 2019-07-09

## 2019-07-09 DIAGNOSIS — I25.10 CORONARY ARTERY DISEASE INVOLVING NATIVE CORONARY ARTERY OF NATIVE HEART WITHOUT ANGINA PECTORIS: ICD-10-CM

## 2019-07-09 DIAGNOSIS — E78.5 HYPERLIPIDEMIA, UNSPECIFIED HYPERLIPIDEMIA TYPE: Primary | ICD-10-CM

## 2019-07-09 DIAGNOSIS — I48.20 CHRONIC ATRIAL FIBRILLATION (HCC): ICD-10-CM

## 2019-07-09 LAB
ALBUMIN SERPL-MCNC: 4.3 G/DL (ref 3.5–5)
ALBUMIN/GLOB SERPL: 1.3 G/DL (ref 1.1–2.5)
ALP SERPL-CCNC: 65 U/L (ref 24–120)
ALT SERPL W P-5'-P-CCNC: 18 U/L (ref 0–54)
ANION GAP SERPL CALCULATED.3IONS-SCNC: 10 MMOL/L (ref 4–13)
AST SERPL-CCNC: 26 U/L (ref 7–45)
BILIRUB SERPL-MCNC: 1.2 MG/DL (ref 0.1–1)
BUN BLD-MCNC: 15 MG/DL (ref 5–21)
BUN/CREAT SERPL: 14.4 (ref 7–25)
CALCIUM SPEC-SCNC: 9.5 MG/DL (ref 8.4–10.4)
CHLORIDE SERPL-SCNC: 97 MMOL/L (ref 98–110)
CO2 SERPL-SCNC: 30 MMOL/L (ref 24–31)
CREAT BLD-MCNC: 1.04 MG/DL (ref 0.5–1.4)
DEPRECATED RDW RBC AUTO: 43.8 FL (ref 40–54)
ERYTHROCYTE [DISTWIDTH] IN BLOOD BY AUTOMATED COUNT: 13.3 % (ref 12–15)
GFR SERPL CREATININE-BSD FRML MDRD: 68 ML/MIN/1.73
GLOBULIN UR ELPH-MCNC: 3.2 GM/DL
GLUCOSE BLD-MCNC: 103 MG/DL (ref 70–100)
HCT VFR BLD AUTO: 41.5 % (ref 40–52)
HGB BLD-MCNC: 13.7 G/DL (ref 14–18)
MCH RBC QN AUTO: 29.5 PG (ref 28–32)
MCHC RBC AUTO-ENTMCNC: 33 G/DL (ref 33–36)
MCV RBC AUTO: 89.4 FL (ref 82–95)
PLATELET # BLD AUTO: 272 10*3/MM3 (ref 130–400)
PMV BLD AUTO: 9.2 FL (ref 6–12)
POTASSIUM BLD-SCNC: 4.4 MMOL/L (ref 3.5–5.3)
PROT SERPL-MCNC: 7.5 G/DL (ref 6.3–8.7)
RBC # BLD AUTO: 4.64 10*6/MM3 (ref 4.8–5.9)
SODIUM BLD-SCNC: 137 MMOL/L (ref 135–145)
WBC NRBC COR # BLD: 7.08 10*3/MM3 (ref 4.8–10.8)

## 2019-07-09 PROCEDURE — 80053 COMPREHEN METABOLIC PANEL: CPT | Performed by: INTERNAL MEDICINE

## 2019-07-09 PROCEDURE — 36415 COLL VENOUS BLD VENIPUNCTURE: CPT

## 2019-07-09 PROCEDURE — 85027 COMPLETE CBC AUTOMATED: CPT | Performed by: INTERNAL MEDICINE

## 2019-08-20 RX ORDER — LISINOPRIL 10 MG/1
TABLET ORAL
Qty: 90 TABLET | Refills: 4 | Status: SHIPPED | OUTPATIENT
Start: 2019-08-20 | End: 2020-01-01 | Stop reason: HOSPADM

## 2019-08-20 RX ORDER — FUROSEMIDE 20 MG/1
TABLET ORAL
Qty: 90 TABLET | Refills: 4 | Status: SHIPPED | OUTPATIENT
Start: 2019-08-20 | End: 2020-01-01 | Stop reason: HOSPADM

## 2019-08-23 RX ORDER — METOPROLOL TARTRATE 50 MG/1
TABLET, FILM COATED ORAL
Qty: 270 TABLET | Refills: 3 | Status: SHIPPED | OUTPATIENT
Start: 2019-08-23 | End: 2020-01-01 | Stop reason: HOSPADM

## 2019-09-17 RX ORDER — RIVAROXABAN 20 MG/1
TABLET, FILM COATED ORAL
Qty: 90 TABLET | Refills: 4 | Status: SHIPPED | OUTPATIENT
Start: 2019-09-17 | End: 2020-01-01

## 2020-01-01 ENCOUNTER — APPOINTMENT (OUTPATIENT)
Dept: CARDIOLOGY | Facility: HOSPITAL | Age: 85
End: 2020-01-01

## 2020-01-01 ENCOUNTER — HOSPITAL ENCOUNTER (OUTPATIENT)
Dept: GENERAL RADIOLOGY | Facility: HOSPITAL | Age: 85
Discharge: HOME | End: 2020-11-24

## 2020-01-01 ENCOUNTER — HOSPITAL ENCOUNTER (OUTPATIENT)
Dept: CT IMAGING | Facility: HOSPITAL | Age: 85
Discharge: HOME | End: 2020-09-29
Admitting: PHYSICIAN ASSISTANT

## 2020-01-01 ENCOUNTER — APPOINTMENT (OUTPATIENT)
Dept: CT IMAGING | Facility: HOSPITAL | Age: 85
End: 2020-01-01

## 2020-01-01 ENCOUNTER — LAB REQUISITION (OUTPATIENT)
Dept: LAB | Facility: HOSPITAL | Age: 85
End: 2020-01-01

## 2020-01-01 ENCOUNTER — OFFICE VISIT (OUTPATIENT)
Dept: WOUND CARE | Facility: HOSPITAL | Age: 85
End: 2020-01-01

## 2020-01-01 ENCOUNTER — HOSPITAL ENCOUNTER (INPATIENT)
Facility: HOSPITAL | Age: 85
LOS: 3 days | Discharge: NURSING, REHAB OR LONG-TERM CARE | End: 2020-12-21
Attending: INTERNAL MEDICINE | Admitting: FAMILY MEDICINE

## 2020-01-01 ENCOUNTER — TELEPHONE (OUTPATIENT)
Dept: CARDIOLOGY | Facility: CLINIC | Age: 85
End: 2020-01-01

## 2020-01-01 ENCOUNTER — TRANSCRIBE ORDERS (OUTPATIENT)
Dept: ADMINISTRATIVE | Facility: HOSPITAL | Age: 85
End: 2020-01-01

## 2020-01-01 ENCOUNTER — APPOINTMENT (OUTPATIENT)
Dept: GENERAL RADIOLOGY | Facility: HOSPITAL | Age: 85
End: 2020-01-01

## 2020-01-01 ENCOUNTER — OFFICE VISIT (OUTPATIENT)
Dept: CARDIOLOGY | Facility: CLINIC | Age: 85
End: 2020-01-01

## 2020-01-01 ENCOUNTER — PATIENT OUTREACH (OUTPATIENT)
Dept: CASE MANAGEMENT | Facility: OTHER | Age: 85
End: 2020-01-01

## 2020-01-01 ENCOUNTER — ANESTHESIA (OUTPATIENT)
Dept: PERIOP | Facility: HOSPITAL | Age: 85
End: 2020-01-01

## 2020-01-01 ENCOUNTER — HOSPITAL ENCOUNTER (OUTPATIENT)
Dept: CT IMAGING | Facility: HOSPITAL | Age: 85
End: 2020-01-01

## 2020-01-01 ENCOUNTER — CLINICAL SUPPORT NO REQUIREMENTS (OUTPATIENT)
Dept: CARDIOLOGY | Facility: CLINIC | Age: 85
End: 2020-01-01

## 2020-01-01 ENCOUNTER — APPOINTMENT (OUTPATIENT)
Dept: WOUND CARE | Facility: HOSPITAL | Age: 85
End: 2020-01-01

## 2020-01-01 ENCOUNTER — HOSPITAL ENCOUNTER (EMERGENCY)
Facility: HOSPITAL | Age: 85
Discharge: HOME | End: 2020-08-08
Admitting: EMERGENCY MEDICINE

## 2020-01-01 ENCOUNTER — LAB (OUTPATIENT)
Dept: LAB | Facility: HOSPITAL | Age: 85
End: 2020-01-01

## 2020-01-01 ENCOUNTER — ANESTHESIA EVENT (OUTPATIENT)
Dept: PERIOP | Facility: HOSPITAL | Age: 85
End: 2020-01-01

## 2020-01-01 ENCOUNTER — OFFICE VISIT (OUTPATIENT)
Dept: NEUROSURGERY | Facility: CLINIC | Age: 85
End: 2020-01-01

## 2020-01-01 ENCOUNTER — EPISODE CHANGES (OUTPATIENT)
Dept: CASE MANAGEMENT | Facility: OTHER | Age: 85
End: 2020-01-01

## 2020-01-01 ENCOUNTER — HOSPITAL ENCOUNTER (EMERGENCY)
Facility: HOSPITAL | Age: 85
Discharge: HOME | End: 2020-10-08
Admitting: FAMILY MEDICINE

## 2020-01-01 ENCOUNTER — HOSPITAL ENCOUNTER (INPATIENT)
Facility: HOSPITAL | Age: 85
LOS: 7 days | Discharge: NURSING, REHAB OR LONG-TERM CARE | End: 2020-11-05
Attending: EMERGENCY MEDICINE | Admitting: NEUROLOGICAL SURGERY

## 2020-01-01 ENCOUNTER — HOSPITAL ENCOUNTER (INPATIENT)
Facility: HOSPITAL | Age: 85
LOS: 6 days | Discharge: NURSING, REHAB OR LONG-TERM CARE | End: 2020-07-27
Attending: EMERGENCY MEDICINE | Admitting: INTERNAL MEDICINE

## 2020-01-01 ENCOUNTER — PREP FOR SURGERY (OUTPATIENT)
Dept: OTHER | Facility: HOSPITAL | Age: 85
End: 2020-01-01

## 2020-01-01 ENCOUNTER — APPOINTMENT (OUTPATIENT)
Dept: ULTRASOUND IMAGING | Facility: HOSPITAL | Age: 85
End: 2020-01-01

## 2020-01-01 ENCOUNTER — TELEPHONE (OUTPATIENT)
Dept: NEUROSURGERY | Facility: CLINIC | Age: 85
End: 2020-01-01

## 2020-01-01 ENCOUNTER — TELEPHONE (OUTPATIENT)
Dept: INTERNAL MEDICINE | Facility: CLINIC | Age: 85
End: 2020-01-01

## 2020-01-01 VITALS
HEART RATE: 70 BPM | HEIGHT: 74 IN | WEIGHT: 186 LBS | RESPIRATION RATE: 18 BRPM | DIASTOLIC BLOOD PRESSURE: 65 MMHG | OXYGEN SATURATION: 98 % | SYSTOLIC BLOOD PRESSURE: 120 MMHG | BODY MASS INDEX: 23.87 KG/M2

## 2020-01-01 VITALS
OXYGEN SATURATION: 96 % | TEMPERATURE: 97.6 F | HEART RATE: 77 BPM | SYSTOLIC BLOOD PRESSURE: 129 MMHG | RESPIRATION RATE: 20 BRPM | BODY MASS INDEX: 24.09 KG/M2 | DIASTOLIC BLOOD PRESSURE: 69 MMHG | WEIGHT: 187.7 LBS | HEIGHT: 74 IN

## 2020-01-01 VITALS
HEART RATE: 86 BPM | WEIGHT: 166.01 LBS | HEIGHT: 74 IN | SYSTOLIC BLOOD PRESSURE: 131 MMHG | RESPIRATION RATE: 18 BRPM | TEMPERATURE: 98 F | DIASTOLIC BLOOD PRESSURE: 65 MMHG | BODY MASS INDEX: 21.3 KG/M2 | OXYGEN SATURATION: 99 %

## 2020-01-01 VITALS
RESPIRATION RATE: 16 BRPM | HEART RATE: 74 BPM | SYSTOLIC BLOOD PRESSURE: 135 MMHG | HEIGHT: 74 IN | WEIGHT: 184 LBS | DIASTOLIC BLOOD PRESSURE: 61 MMHG | BODY MASS INDEX: 23.61 KG/M2 | TEMPERATURE: 97.5 F | OXYGEN SATURATION: 98 %

## 2020-01-01 VITALS
SYSTOLIC BLOOD PRESSURE: 141 MMHG | TEMPERATURE: 97.9 F | BODY MASS INDEX: 24.07 KG/M2 | DIASTOLIC BLOOD PRESSURE: 57 MMHG | RESPIRATION RATE: 16 BRPM | OXYGEN SATURATION: 98 % | WEIGHT: 187.6 LBS | HEART RATE: 71 BPM | HEIGHT: 74 IN

## 2020-01-01 VITALS
BODY MASS INDEX: 23.87 KG/M2 | SYSTOLIC BLOOD PRESSURE: 130 MMHG | HEART RATE: 65 BPM | DIASTOLIC BLOOD PRESSURE: 70 MMHG | WEIGHT: 186 LBS | OXYGEN SATURATION: 99 % | HEIGHT: 74 IN | TEMPERATURE: 98.2 F | RESPIRATION RATE: 16 BRPM

## 2020-01-01 VITALS
DIASTOLIC BLOOD PRESSURE: 56 MMHG | BODY MASS INDEX: 23.36 KG/M2 | HEART RATE: 76 BPM | HEIGHT: 74 IN | WEIGHT: 182 LBS | SYSTOLIC BLOOD PRESSURE: 102 MMHG

## 2020-01-01 VITALS
HEART RATE: 70 BPM | WEIGHT: 195 LBS | HEIGHT: 74 IN | SYSTOLIC BLOOD PRESSURE: 141 MMHG | BODY MASS INDEX: 25.03 KG/M2 | DIASTOLIC BLOOD PRESSURE: 71 MMHG

## 2020-01-01 VITALS
SYSTOLIC BLOOD PRESSURE: 120 MMHG | HEIGHT: 74 IN | BODY MASS INDEX: 23.87 KG/M2 | WEIGHT: 186 LBS | DIASTOLIC BLOOD PRESSURE: 68 MMHG

## 2020-01-01 DIAGNOSIS — S20.211A HEMATOMA OF RIGHT CHEST WALL, INITIAL ENCOUNTER: ICD-10-CM

## 2020-01-01 DIAGNOSIS — Z74.09 IMPAIRED FUNCTIONAL MOBILITY, BALANCE, GAIT, AND ENDURANCE: ICD-10-CM

## 2020-01-01 DIAGNOSIS — Z95.0 CARDIAC PACEMAKER: ICD-10-CM

## 2020-01-01 DIAGNOSIS — S00.03XA CONTUSION OF SCALP, INITIAL ENCOUNTER: ICD-10-CM

## 2020-01-01 DIAGNOSIS — Z95.0 CARDIAC PACEMAKER: Primary | ICD-10-CM

## 2020-01-01 DIAGNOSIS — I25.10 CORONARY ARTERY DISEASE INVOLVING NATIVE CORONARY ARTERY OF NATIVE HEART WITHOUT ANGINA PECTORIS: ICD-10-CM

## 2020-01-01 DIAGNOSIS — M54.10 RADICULOPATHY OF LEG: Primary | ICD-10-CM

## 2020-01-01 DIAGNOSIS — M54.50 LOW BACK PAIN, UNSPECIFIED BACK PAIN LATERALITY, UNSPECIFIED CHRONICITY, UNSPECIFIED WHETHER SCIATICA PRESENT: ICD-10-CM

## 2020-01-01 DIAGNOSIS — I49.5 SSS (SICK SINUS SYNDROME) (HCC): ICD-10-CM

## 2020-01-01 DIAGNOSIS — R53.1 WEAKNESS: ICD-10-CM

## 2020-01-01 DIAGNOSIS — I25.10 CORONARY ARTERY DISEASE INVOLVING NATIVE CORONARY ARTERY OF NATIVE HEART WITHOUT ANGINA PECTORIS: Primary | ICD-10-CM

## 2020-01-01 DIAGNOSIS — Z95.0 PACEMAKER: Primary | ICD-10-CM

## 2020-01-01 DIAGNOSIS — Z78.9 NON-SMOKER: ICD-10-CM

## 2020-01-01 DIAGNOSIS — I48.21 PERMANENT ATRIAL FIBRILLATION (HCC): ICD-10-CM

## 2020-01-01 DIAGNOSIS — E78.49 OTHER HYPERLIPIDEMIA: ICD-10-CM

## 2020-01-01 DIAGNOSIS — M48.062 SPINAL STENOSIS, LUMBAR REGION, WITH NEUROGENIC CLAUDICATION: Primary | ICD-10-CM

## 2020-01-01 DIAGNOSIS — M54.10 RADICULOPATHY, UNSPECIFIED SPINAL REGION: Primary | ICD-10-CM

## 2020-01-01 DIAGNOSIS — Z00.00 ENCOUNTER FOR GENERAL ADULT MEDICAL EXAMINATION WITHOUT ABNORMAL FINDINGS: ICD-10-CM

## 2020-01-01 DIAGNOSIS — Z78.9 IMPAIRED MOBILITY AND ADLS: ICD-10-CM

## 2020-01-01 DIAGNOSIS — J32.9 SINUSITIS, UNSPECIFIED CHRONICITY, UNSPECIFIED LOCATION: ICD-10-CM

## 2020-01-01 DIAGNOSIS — M25.551 RIGHT HIP PAIN: Primary | ICD-10-CM

## 2020-01-01 DIAGNOSIS — Z74.09 IMPAIRED MOBILITY: ICD-10-CM

## 2020-01-01 DIAGNOSIS — R13.12 OROPHARYNGEAL DYSPHAGIA: ICD-10-CM

## 2020-01-01 DIAGNOSIS — S72.114A CLOSED NONDISPLACED FRACTURE OF GREATER TROCHANTER OF RIGHT FEMUR, INITIAL ENCOUNTER (HCC): Primary | ICD-10-CM

## 2020-01-01 DIAGNOSIS — Z78.9 DECREASED ACTIVITIES OF DAILY LIVING (ADL): ICD-10-CM

## 2020-01-01 DIAGNOSIS — I50.42 CHRONIC COMBINED SYSTOLIC AND DIASTOLIC CONGESTIVE HEART FAILURE (HCC): ICD-10-CM

## 2020-01-01 DIAGNOSIS — I10 ESSENTIAL HYPERTENSION: ICD-10-CM

## 2020-01-01 DIAGNOSIS — R17 ELEVATED BILIRUBIN: ICD-10-CM

## 2020-01-01 DIAGNOSIS — E87.1 HYPONATREMIA: Primary | ICD-10-CM

## 2020-01-01 DIAGNOSIS — M54.10 RADICULOPATHY, UNSPECIFIED SPINAL REGION: ICD-10-CM

## 2020-01-01 DIAGNOSIS — I50.42 CHRONIC COMBINED SYSTOLIC AND DIASTOLIC CONGESTIVE HEART FAILURE (HCC): Primary | ICD-10-CM

## 2020-01-01 DIAGNOSIS — Z74.09 IMPAIRED MOBILITY AND ADLS: ICD-10-CM

## 2020-01-01 DIAGNOSIS — I48.91 ATRIAL FIBRILLATION, UNSPECIFIED TYPE (HCC): ICD-10-CM

## 2020-01-01 DIAGNOSIS — I48.11 LONGSTANDING PERSISTENT ATRIAL FIBRILLATION (HCC): ICD-10-CM

## 2020-01-01 DIAGNOSIS — M48.062 SPINAL STENOSIS, LUMBAR REGION, WITH NEUROGENIC CLAUDICATION: ICD-10-CM

## 2020-01-01 DIAGNOSIS — R26.2 INABILITY TO WALK: Primary | ICD-10-CM

## 2020-01-01 DIAGNOSIS — N12 PYELONEPHRITIS: ICD-10-CM

## 2020-01-01 DIAGNOSIS — G89.29 CHRONIC LOW BACK PAIN, UNSPECIFIED BACK PAIN LATERALITY, UNSPECIFIED WHETHER SCIATICA PRESENT: ICD-10-CM

## 2020-01-01 DIAGNOSIS — Z79.01 CURRENT USE OF LONG TERM ANTICOAGULATION: ICD-10-CM

## 2020-01-01 DIAGNOSIS — Z79.01 CHRONIC ANTICOAGULATION: ICD-10-CM

## 2020-01-01 DIAGNOSIS — E87.1 HYPONATREMIA: ICD-10-CM

## 2020-01-01 DIAGNOSIS — M54.50 CHRONIC LOW BACK PAIN, UNSPECIFIED BACK PAIN LATERALITY, UNSPECIFIED WHETHER SCIATICA PRESENT: ICD-10-CM

## 2020-01-01 DIAGNOSIS — J18.9 PNEUMONIA OF BOTH LUNGS DUE TO INFECTIOUS ORGANISM, UNSPECIFIED PART OF LUNG: ICD-10-CM

## 2020-01-01 DIAGNOSIS — J30.9 ALLERGIC RHINITIS, UNSPECIFIED SEASONALITY, UNSPECIFIED TRIGGER: ICD-10-CM

## 2020-01-01 DIAGNOSIS — J81.0 ACUTE PULMONARY EDEMA (HCC): Primary | ICD-10-CM
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
## 2020-01-01 LAB
ABO GROUP BLD: NORMAL
ABO GROUP BLD: NORMAL
ALBUMIN SERPL-MCNC: 2.9 G/DL (ref 3.5–5.2)
ALBUMIN SERPL-MCNC: 2.9 G/DL (ref 3.5–5.2)
ALBUMIN SERPL-MCNC: 3 G/DL (ref 3.5–5.2)
ALBUMIN SERPL-MCNC: 3 G/DL (ref 3.5–5.2)
ALBUMIN SERPL-MCNC: 3.2 G/DL (ref 3.5–5.2)
ALBUMIN SERPL-MCNC: 3.3 G/DL (ref 3.5–5.2)
ALBUMIN SERPL-MCNC: 3.3 G/DL (ref 3.5–5.2)
ALBUMIN SERPL-MCNC: 3.4 G/DL (ref 3.5–5.2)
ALBUMIN SERPL-MCNC: 3.6 G/DL (ref 3.5–5.2)
ALBUMIN SERPL-MCNC: 3.8 G/DL (ref 3.5–5.2)
ALBUMIN/GLOB SERPL: 0.9 G/DL
ALBUMIN/GLOB SERPL: 1.1 G/DL
ALBUMIN/GLOB SERPL: 1.1 G/DL
ALBUMIN/GLOB SERPL: 1.3 G/DL
ALBUMIN/GLOB SERPL: 1.3 G/DL
ALBUMIN/GLOB SERPL: 1.4 G/DL
ALBUMIN/GLOB SERPL: 1.6 G/DL
ALBUMIN/GLOB SERPL: 1.6 G/DL
ALBUMIN/GLOB SERPL: 1.7 G/DL
ALBUMIN/GLOB SERPL: 1.7 G/DL
ALBUMIN/GLOB SERPL: 1.8 G/DL
ALP SERPL-CCNC: 101 U/L (ref 39–117)
ALP SERPL-CCNC: 103 U/L (ref 39–117)
ALP SERPL-CCNC: 141 U/L (ref 39–117)
ALP SERPL-CCNC: 51 U/L (ref 39–117)
ALP SERPL-CCNC: 51 U/L (ref 39–117)
ALP SERPL-CCNC: 54 U/L (ref 39–117)
ALP SERPL-CCNC: 54 U/L (ref 39–117)
ALP SERPL-CCNC: 55 U/L (ref 39–117)
ALP SERPL-CCNC: 58 U/L (ref 39–117)
ALP SERPL-CCNC: 81 U/L (ref 39–117)
ALP SERPL-CCNC: 98 U/L (ref 39–117)
ALP SERPL-CCNC: 99 U/L (ref 39–117)
ALT SERPL W P-5'-P-CCNC: 13 U/L (ref 1–41)
ALT SERPL W P-5'-P-CCNC: 14 U/L (ref 1–41)
ALT SERPL W P-5'-P-CCNC: 15 U/L (ref 1–41)
ALT SERPL W P-5'-P-CCNC: 20 U/L (ref 1–41)
ALT SERPL W P-5'-P-CCNC: 21 U/L (ref 1–41)
ALT SERPL W P-5'-P-CCNC: 28 U/L (ref 1–41)
ALT SERPL W P-5'-P-CCNC: 35 U/L (ref 1–41)
ALT SERPL W P-5'-P-CCNC: 52 U/L (ref 1–41)
ALT SERPL W P-5'-P-CCNC: 52 U/L (ref 1–41)
ALT SERPL W P-5'-P-CCNC: 58 U/L (ref 1–41)
ALT SERPL W P-5'-P-CCNC: 71 U/L (ref 1–41)
ALT SERPL W P-5'-P-CCNC: 73 U/L (ref 1–41)
ANION GAP SERPL CALCULATED.3IONS-SCNC: 10 MMOL/L (ref 5–15)
ANION GAP SERPL CALCULATED.3IONS-SCNC: 11 MMOL/L (ref 5–15)
ANION GAP SERPL CALCULATED.3IONS-SCNC: 12 MMOL/L (ref 5–15)
ANION GAP SERPL CALCULATED.3IONS-SCNC: 13 MMOL/L (ref 5–15)
ANION GAP SERPL CALCULATED.3IONS-SCNC: 15 MMOL/L (ref 5–15)
ANION GAP SERPL CALCULATED.3IONS-SCNC: 17 MMOL/L (ref 5–15)
ANION GAP SERPL CALCULATED.3IONS-SCNC: 7 MMOL/L (ref 5–15)
ANION GAP SERPL CALCULATED.3IONS-SCNC: 8 MMOL/L (ref 5–15)
ANION GAP SERPL CALCULATED.3IONS-SCNC: 8 MMOL/L (ref 5–15)
ANION GAP SERPL CALCULATED.3IONS-SCNC: 9 MMOL/L (ref 5–15)
APTT PPP: 31.8 SECONDS (ref 24.1–35)
APTT PPP: 37.9 SECONDS (ref 24.1–35)
APTT PPP: 44.4 SECONDS (ref 24.1–35)
ARTERIAL PATENCY WRIST A: ABNORMAL
AST SERPL-CCNC: 22 U/L (ref 1–40)
AST SERPL-CCNC: 24 U/L (ref 1–40)
AST SERPL-CCNC: 25 U/L (ref 1–40)
AST SERPL-CCNC: 26 U/L (ref 1–40)
AST SERPL-CCNC: 30 U/L (ref 1–40)
AST SERPL-CCNC: 62 U/L (ref 1–40)
AST SERPL-CCNC: 74 U/L (ref 1–40)
AST SERPL-CCNC: 77 U/L (ref 1–40)
AST SERPL-CCNC: 85 U/L (ref 1–40)
AST SERPL-CCNC: 94 U/L (ref 1–40)
ATMOSPHERIC PRESS: 745 MMHG
BACTERIA SPEC AEROBE CULT: ABNORMAL
BACTERIA SPEC AEROBE CULT: NO GROWTH
BACTERIA SPEC AEROBE CULT: NORMAL
BACTERIA SPEC AEROBE CULT: NORMAL
BACTERIA UR QL AUTO: ABNORMAL /HPF
BACTERIA UR QL AUTO: ABNORMAL /HPF
BASE EXCESS BLDA CALC-SCNC: 6.5 MMOL/L (ref 0–2)
BASOPHILS # BLD AUTO: 0.01 10*3/MM3 (ref 0–0.2)
BASOPHILS # BLD AUTO: 0.02 10*3/MM3 (ref 0–0.2)
BASOPHILS # BLD AUTO: 0.03 10*3/MM3 (ref 0–0.2)
BASOPHILS # BLD AUTO: 0.06 10*3/MM3 (ref 0–0.2)
BASOPHILS # BLD AUTO: 0.06 10*3/MM3 (ref 0–0.2)
BASOPHILS # BLD AUTO: 0.07 10*3/MM3 (ref 0–0.2)
BASOPHILS NFR BLD AUTO: 0.1 % (ref 0–1.5)
BASOPHILS NFR BLD AUTO: 0.2 % (ref 0–1.5)
BASOPHILS NFR BLD AUTO: 0.3 % (ref 0–1.5)
BASOPHILS NFR BLD AUTO: 0.4 % (ref 0–1.5)
BASOPHILS NFR BLD AUTO: 0.6 % (ref 0–1.5)
BASOPHILS NFR BLD AUTO: 0.7 % (ref 0–1.5)
BASOPHILS NFR BLD AUTO: 1.1 % (ref 0–1.5)
BDY SITE: ABNORMAL
BH BB BLOOD EXPIRATION DATE: NORMAL
BH BB BLOOD TYPE BARCODE: 7300
BH BB DISPENSE STATUS: NORMAL
BH BB PRODUCT CODE: NORMAL
BH BB UNIT NUMBER: NORMAL
BH CV ECHO MEAS - AO MAX PG (FULL): 2.6 MMHG
BH CV ECHO MEAS - AO MAX PG: 3.5 MMHG
BH CV ECHO MEAS - AO MEAN PG (FULL): 2 MMHG
BH CV ECHO MEAS - AO MEAN PG: 2 MMHG
BH CV ECHO MEAS - AO ROOT AREA (BSA CORRECTED): 1.5
BH CV ECHO MEAS - AO ROOT AREA: 9.1 CM^2
BH CV ECHO MEAS - AO ROOT DIAM: 3.4 CM
BH CV ECHO MEAS - AO V2 MAX: 93 CM/SEC
BH CV ECHO MEAS - AO V2 MEAN: 68.2 CM/SEC
BH CV ECHO MEAS - AO V2 VTI: 16.8 CM
BH CV ECHO MEAS - AVA(I,A): 1.9 CM^2
BH CV ECHO MEAS - AVA(I,D): 1.9 CM^2
BH CV ECHO MEAS - AVA(V,A): 2 CM^2
BH CV ECHO MEAS - AVA(V,D): 2 CM^2
BH CV ECHO MEAS - BSA(HAYCOCK): 2.3 M^2
BH CV ECHO MEAS - BSA: 2.2 M^2
BH CV ECHO MEAS - BZI_BMI: 27.7 KILOGRAMS/M^2
BH CV ECHO MEAS - BZI_METRIC_HEIGHT: 188 CM
BH CV ECHO MEAS - BZI_METRIC_WEIGHT: 98 KG
BH CV ECHO MEAS - EDV(CUBED): 180.4 ML
BH CV ECHO MEAS - EDV(MOD-SP4): 210 ML
BH CV ECHO MEAS - EDV(TEICH): 156.8 ML
BH CV ECHO MEAS - EF(CUBED): 29 %
BH CV ECHO MEAS - EF(MOD-SP4): 20.5 %
BH CV ECHO MEAS - EF(TEICH): 23.2 %
BH CV ECHO MEAS - ESV(CUBED): 128 ML
BH CV ECHO MEAS - ESV(MOD-SP4): 167 ML
BH CV ECHO MEAS - ESV(TEICH): 120.5 ML
BH CV ECHO MEAS - FS: 10.8 %
BH CV ECHO MEAS - IVS/LVPW: 0.85
BH CV ECHO MEAS - IVSD: 1.1 CM
BH CV ECHO MEAS - LA DIMENSION: 4.9 CM
BH CV ECHO MEAS - LA/AO: 1.4
BH CV ECHO MEAS - LAT PEAK E' VEL: 6.5 CM/SEC
BH CV ECHO MEAS - LV DIASTOLIC VOL/BSA (35-75): 93.5 ML/M^2
BH CV ECHO MEAS - LV MASS(C)D: 286.2 GRAMS
BH CV ECHO MEAS - LV MASS(C)DI: 127.5 GRAMS/M^2
BH CV ECHO MEAS - LV MAX PG: 0.81 MMHG
BH CV ECHO MEAS - LV MEAN PG: 0 MMHG
BH CV ECHO MEAS - LV SYSTOLIC VOL/BSA (12-30): 74.4 ML/M^2
BH CV ECHO MEAS - LV V1 MAX: 45.1 CM/SEC
BH CV ECHO MEAS - LV V1 MEAN: 31.1 CM/SEC
BH CV ECHO MEAS - LV V1 VTI: 7.8 CM
BH CV ECHO MEAS - LVIDD: 5.7 CM
BH CV ECHO MEAS - LVIDS: 5 CM
BH CV ECHO MEAS - LVLD AP4: 8.8 CM
BH CV ECHO MEAS - LVLS AP4: 9.3 CM
BH CV ECHO MEAS - LVOT AREA (M): 4.2 CM^2
BH CV ECHO MEAS - LVOT AREA: 4.2 CM^2
BH CV ECHO MEAS - LVOT DIAM: 2.3 CM
BH CV ECHO MEAS - LVPWD: 1.3 CM
BH CV ECHO MEAS - MED PEAK E' VEL: 6.8 CM/SEC
BH CV ECHO MEAS - MR MAX PG: 97.6 MMHG
BH CV ECHO MEAS - MR MAX VEL: 494 CM/SEC
BH CV ECHO MEAS - MR MEAN PG: 58 MMHG
BH CV ECHO MEAS - MR MEAN VEL: 352 CM/SEC
BH CV ECHO MEAS - MR VTI: 181 CM
BH CV ECHO MEAS - MV A MAX VEL: 36.6 CM/SEC
BH CV ECHO MEAS - MV DEC SLOPE: 394 CM/SEC^2
BH CV ECHO MEAS - MV DEC TIME: 0.23 SEC
BH CV ECHO MEAS - MV E MAX VEL: 88.7 CM/SEC
BH CV ECHO MEAS - MV E/A: 2.4
BH CV ECHO MEAS - RAP SYSTOLE: 10 MMHG
BH CV ECHO MEAS - RVSP: 58.4 MMHG
BH CV ECHO MEAS - SI(AO): 67.9 ML/M^2
BH CV ECHO MEAS - SI(CUBED): 23.3 ML/M^2
BH CV ECHO MEAS - SI(LVOT): 14.5 ML/M^2
BH CV ECHO MEAS - SI(MOD-SP4): 19.2 ML/M^2
BH CV ECHO MEAS - SI(TEICH): 16.2 ML/M^2
BH CV ECHO MEAS - SV(AO): 152.5 ML
BH CV ECHO MEAS - SV(CUBED): 52.3 ML
BH CV ECHO MEAS - SV(LVOT): 32.5 ML
BH CV ECHO MEAS - SV(MOD-SP4): 43 ML
BH CV ECHO MEAS - SV(TEICH): 36.4 ML
BH CV ECHO MEAS - TR MAX VEL: 348 CM/SEC
BH CV ECHO MEASUREMENTS AVERAGE E/E' RATIO: 13.34
BH CV NUCLEAR PRIOR STUDY: 3
BH CV STRESS BP STAGE 1: NORMAL
BH CV STRESS COMMENTS STAGE 1: NORMAL
BH CV STRESS COMMENTS STAGE 2: NORMAL
BH CV STRESS DOSE REGADENOSON STAGE 1: 0.4
BH CV STRESS DURATION MIN STAGE 1: 0
BH CV STRESS DURATION MIN STAGE 2: 0
BH CV STRESS DURATION SEC STAGE 1: 15
BH CV STRESS DURATION SEC STAGE 2: 0
BH CV STRESS HR STAGE 1: 75
BH CV STRESS PROTOCOL 1: NORMAL
BH CV STRESS STAGE 1: 1
BH CV STRESS STAGE 2: 2
BILIRUB CONJ SERPL-MCNC: 0.8 MG/DL (ref 0–0.3)
BILIRUB INDIRECT SERPL-MCNC: 0.8 MG/DL
BILIRUB SERPL-MCNC: 1.2 MG/DL (ref 0–1.2)
BILIRUB SERPL-MCNC: 1.6 MG/DL (ref 0–1.2)
BILIRUB SERPL-MCNC: 1.8 MG/DL (ref 0–1.2)
BILIRUB SERPL-MCNC: 1.9 MG/DL (ref 0–1.2)
BILIRUB SERPL-MCNC: 2 MG/DL (ref 0–1.2)
BILIRUB SERPL-MCNC: 2.1 MG/DL (ref 0–1.2)
BILIRUB SERPL-MCNC: 2.3 MG/DL (ref 0–1.2)
BILIRUB SERPL-MCNC: 2.3 MG/DL (ref 0–1.2)
BILIRUB SERPL-MCNC: 2.4 MG/DL (ref 0–1.2)
BILIRUB SERPL-MCNC: 2.4 MG/DL (ref 0–1.2)
BILIRUB SERPL-MCNC: 2.5 MG/DL (ref 0–1.2)
BILIRUB SERPL-MCNC: 2.8 MG/DL (ref 0–1.2)
BILIRUB UR QL STRIP: NEGATIVE
BLD GP AB SCN SERPL QL: NEGATIVE
BLD GP AB SCN SERPL QL: NEGATIVE
BODY TEMPERATURE: 37 C
BUN SERPL-MCNC: 10 MG/DL (ref 8–23)
BUN SERPL-MCNC: 11 MG/DL (ref 8–23)
BUN SERPL-MCNC: 12 MG/DL (ref 8–23)
BUN SERPL-MCNC: 13 MG/DL (ref 8–23)
BUN SERPL-MCNC: 14 MG/DL (ref 8–23)
BUN SERPL-MCNC: 15 MG/DL (ref 8–23)
BUN SERPL-MCNC: 18 MG/DL (ref 8–23)
BUN SERPL-MCNC: 19 MG/DL (ref 8–23)
BUN SERPL-MCNC: 19 MG/DL (ref 8–23)
BUN SERPL-MCNC: 23 MG/DL (ref 8–23)
BUN SERPL-MCNC: 24 MG/DL (ref 8–23)
BUN SERPL-MCNC: 25 MG/DL (ref 8–23)
BUN SERPL-MCNC: 25 MG/DL (ref 8–23)
BUN SERPL-MCNC: 26 MG/DL (ref 8–23)
BUN SERPL-MCNC: 27 MG/DL (ref 8–23)
BUN SERPL-MCNC: 29 MG/DL (ref 8–23)
BUN SERPL-MCNC: 30 MG/DL (ref 8–23)
BUN SERPL-MCNC: 31 MG/DL (ref 8–23)
BUN SERPL-MCNC: 9 MG/DL (ref 8–23)
BUN SERPL-MCNC: 9 MG/DL (ref 8–23)
BUN/CREAT SERPL: 16.1 (ref 7–25)
BUN/CREAT SERPL: 16.5 (ref 7–25)
BUN/CREAT SERPL: 17.4 (ref 7–25)
BUN/CREAT SERPL: 17.6 (ref 7–25)
BUN/CREAT SERPL: 18.2 (ref 7–25)
BUN/CREAT SERPL: 18.5 (ref 7–25)
BUN/CREAT SERPL: 18.6 (ref 7–25)
BUN/CREAT SERPL: 18.7 (ref 7–25)
BUN/CREAT SERPL: 18.8 (ref 7–25)
BUN/CREAT SERPL: 19.1 (ref 7–25)
BUN/CREAT SERPL: 19.2 (ref 7–25)
BUN/CREAT SERPL: 19.6 (ref 7–25)
BUN/CREAT SERPL: 19.7 (ref 7–25)
BUN/CREAT SERPL: 20.5 (ref 7–25)
BUN/CREAT SERPL: 21.3 (ref 7–25)
BUN/CREAT SERPL: 21.6 (ref 7–25)
BUN/CREAT SERPL: 21.8 (ref 7–25)
BUN/CREAT SERPL: 21.9 (ref 7–25)
BUN/CREAT SERPL: 22 (ref 7–25)
BUN/CREAT SERPL: 22.4 (ref 7–25)
BUN/CREAT SERPL: 24.4 (ref 7–25)
BUN/CREAT SERPL: 24.6 (ref 7–25)
BUN/CREAT SERPL: 25.3 (ref 7–25)
BUN/CREAT SERPL: 26 (ref 7–25)
BUN/CREAT SERPL: 26 (ref 7–25)
BUN/CREAT SERPL: 26.7 (ref 7–25)
BUN/CREAT SERPL: 27.3 (ref 7–25)
BUN/CREAT SERPL: 27.3 (ref 7–25)
BUN/CREAT SERPL: 31.2 (ref 7–25)
BUN/CREAT SERPL: 34.5 (ref 7–25)
BUN/CREAT SERPL: 34.8 (ref 7–25)
BUN/CREAT SERPL: 36.1 (ref 7–25)
CALCIUM SPEC-SCNC: 7.8 MG/DL (ref 8.6–10.5)
CALCIUM SPEC-SCNC: 8 MG/DL (ref 8.6–10.5)
CALCIUM SPEC-SCNC: 8 MG/DL (ref 8.6–10.5)
CALCIUM SPEC-SCNC: 8.1 MG/DL (ref 8.6–10.5)
CALCIUM SPEC-SCNC: 8.2 MG/DL (ref 8.6–10.5)
CALCIUM SPEC-SCNC: 8.3 MG/DL (ref 8.6–10.5)
CALCIUM SPEC-SCNC: 8.4 MG/DL (ref 8.6–10.5)
CALCIUM SPEC-SCNC: 8.5 MG/DL (ref 8.6–10.5)
CALCIUM SPEC-SCNC: 8.5 MG/DL (ref 8.6–10.5)
CALCIUM SPEC-SCNC: 8.6 MG/DL (ref 8.6–10.5)
CALCIUM SPEC-SCNC: 8.7 MG/DL (ref 8.6–10.5)
CALCIUM SPEC-SCNC: 8.8 MG/DL (ref 8.6–10.5)
CALCIUM SPEC-SCNC: 8.9 MG/DL (ref 8.6–10.5)
CALCIUM SPEC-SCNC: 8.9 MG/DL (ref 8.6–10.5)
CALCIUM SPEC-SCNC: 9.2 MG/DL (ref 8.6–10.5)
CHLORIDE SERPL-SCNC: 100 MMOL/L (ref 98–107)
CHLORIDE SERPL-SCNC: 79 MMOL/L (ref 98–107)
CHLORIDE SERPL-SCNC: 81 MMOL/L (ref 98–107)
CHLORIDE SERPL-SCNC: 82 MMOL/L (ref 98–107)
CHLORIDE SERPL-SCNC: 83 MMOL/L (ref 98–107)
CHLORIDE SERPL-SCNC: 84 MMOL/L (ref 98–107)
CHLORIDE SERPL-SCNC: 86 MMOL/L (ref 98–107)
CHLORIDE SERPL-SCNC: 89 MMOL/L (ref 98–107)
CHLORIDE SERPL-SCNC: 89 MMOL/L (ref 98–107)
CHLORIDE SERPL-SCNC: 90 MMOL/L (ref 98–107)
CHLORIDE SERPL-SCNC: 91 MMOL/L (ref 98–107)
CHLORIDE SERPL-SCNC: 92 MMOL/L (ref 98–107)
CHLORIDE SERPL-SCNC: 93 MMOL/L (ref 98–107)
CHLORIDE SERPL-SCNC: 93 MMOL/L (ref 98–107)
CHLORIDE SERPL-SCNC: 94 MMOL/L (ref 98–107)
CHLORIDE SERPL-SCNC: 95 MMOL/L (ref 98–107)
CHLORIDE SERPL-SCNC: 96 MMOL/L (ref 98–107)
CHLORIDE SERPL-SCNC: 97 MMOL/L (ref 98–107)
CHLORIDE SERPL-SCNC: 97 MMOL/L (ref 98–107)
CHLORIDE SERPL-SCNC: 98 MMOL/L (ref 98–107)
CHLORIDE SERPL-SCNC: 99 MMOL/L (ref 98–107)
CHOLEST SERPL-MCNC: 127 MG/DL (ref 0–200)
CHOLEST SERPL-MCNC: 99 MG/DL (ref 0–200)
CK SERPL-CCNC: 637 U/L (ref 20–200)
CLARITY UR: ABNORMAL
CLARITY UR: ABNORMAL
CLARITY UR: CLEAR
CO2 SERPL-SCNC: 19 MMOL/L (ref 22–29)
CO2 SERPL-SCNC: 20 MMOL/L (ref 22–29)
CO2 SERPL-SCNC: 21 MMOL/L (ref 22–29)
CO2 SERPL-SCNC: 23 MMOL/L (ref 22–29)
CO2 SERPL-SCNC: 24 MMOL/L (ref 22–29)
CO2 SERPL-SCNC: 25 MMOL/L (ref 22–29)
CO2 SERPL-SCNC: 25 MMOL/L (ref 22–29)
CO2 SERPL-SCNC: 26 MMOL/L (ref 22–29)
CO2 SERPL-SCNC: 26 MMOL/L (ref 22–29)
CO2 SERPL-SCNC: 27 MMOL/L (ref 22–29)
CO2 SERPL-SCNC: 28 MMOL/L (ref 22–29)
CO2 SERPL-SCNC: 29 MMOL/L (ref 22–29)
CO2 SERPL-SCNC: 30 MMOL/L (ref 22–29)
CO2 SERPL-SCNC: 31 MMOL/L (ref 22–29)
CO2 SERPL-SCNC: 32 MMOL/L (ref 22–29)
CO2 SERPL-SCNC: 33 MMOL/L (ref 22–29)
CO2 SERPL-SCNC: 33 MMOL/L (ref 22–29)
CO2 SERPL-SCNC: 34 MMOL/L (ref 22–29)
COARSE GRAN CASTS URNS QL MICRO: ABNORMAL /LPF
COLOR UR: ABNORMAL
COLOR UR: YELLOW
COLOR UR: YELLOW
CREAT SERPL-MCNC: 0.41 MG/DL (ref 0.76–1.27)
CREAT SERPL-MCNC: 0.48 MG/DL (ref 0.76–1.27)
CREAT SERPL-MCNC: 0.54 MG/DL (ref 0.76–1.27)
CREAT SERPL-MCNC: 0.55 MG/DL (ref 0.76–1.27)
CREAT SERPL-MCNC: 0.56 MG/DL (ref 0.76–1.27)
CREAT SERPL-MCNC: 0.59 MG/DL (ref 0.76–1.27)
CREAT SERPL-MCNC: 0.61 MG/DL (ref 0.76–1.27)
CREAT SERPL-MCNC: 0.66 MG/DL (ref 0.76–1.27)
CREAT SERPL-MCNC: 0.67 MG/DL (ref 0.76–1.27)
CREAT SERPL-MCNC: 0.68 MG/DL (ref 0.76–1.27)
CREAT SERPL-MCNC: 0.73 MG/DL (ref 0.76–1.27)
CREAT SERPL-MCNC: 0.75 MG/DL (ref 0.76–1.27)
CREAT SERPL-MCNC: 0.83 MG/DL (ref 0.76–1.27)
CREAT SERPL-MCNC: 0.85 MG/DL (ref 0.76–1.27)
CREAT SERPL-MCNC: 0.86 MG/DL (ref 0.76–1.27)
CREAT SERPL-MCNC: 0.86 MG/DL (ref 0.76–1.27)
CREAT SERPL-MCNC: 0.87 MG/DL (ref 0.76–1.27)
CREAT SERPL-MCNC: 0.87 MG/DL (ref 0.76–1.27)
CREAT SERPL-MCNC: 0.89 MG/DL (ref 0.76–1.27)
CREAT SERPL-MCNC: 0.93 MG/DL (ref 0.76–1.27)
CREAT SERPL-MCNC: 0.94 MG/DL (ref 0.76–1.27)
CREAT SERPL-MCNC: 0.95 MG/DL (ref 0.76–1.27)
CREAT SERPL-MCNC: 0.96 MG/DL (ref 0.76–1.27)
CREAT SERPL-MCNC: 0.97 MG/DL (ref 0.76–1.27)
CREAT SERPL-MCNC: 0.99 MG/DL (ref 0.76–1.27)
CREAT SERPL-MCNC: 1 MG/DL (ref 0.76–1.27)
CREAT SERPL-MCNC: 1.05 MG/DL (ref 0.76–1.27)
CREAT SERPL-MCNC: 1.05 MG/DL (ref 0.76–1.27)
CREAT SERPL-MCNC: 1.08 MG/DL (ref 0.76–1.27)
CREAT SERPL-MCNC: 1.09 MG/DL (ref 0.76–1.27)
CREAT SERPL-MCNC: 1.1 MG/DL (ref 0.76–1.27)
CREAT SERPL-MCNC: 1.11 MG/DL (ref 0.76–1.27)
CREAT SERPL-MCNC: 1.14 MG/DL (ref 0.76–1.27)
CREAT SERPL-MCNC: 1.17 MG/DL (ref 0.76–1.27)
CYTO UR: NORMAL
D DIMER PPP FEU-MCNC: 2.2 MG/L (FEU) (ref 0–0.5)
D-LACTATE SERPL-SCNC: 1.8 MMOL/L (ref 0.5–2)
DEPRECATED RDW RBC AUTO: 38.5 FL (ref 37–54)
DEPRECATED RDW RBC AUTO: 38.8 FL (ref 37–54)
DEPRECATED RDW RBC AUTO: 39.8 FL (ref 37–54)
DEPRECATED RDW RBC AUTO: 41.1 FL (ref 37–54)
DEPRECATED RDW RBC AUTO: 41.8 FL (ref 37–54)
DEPRECATED RDW RBC AUTO: 42 FL (ref 37–54)
DEPRECATED RDW RBC AUTO: 42.2 FL (ref 37–54)
DEPRECATED RDW RBC AUTO: 46.5 FL (ref 37–54)
DEPRECATED RDW RBC AUTO: 46.9 FL (ref 37–54)
DEPRECATED RDW RBC AUTO: 47.2 FL (ref 37–54)
DEPRECATED RDW RBC AUTO: 47.3 FL (ref 37–54)
DEPRECATED RDW RBC AUTO: 47.8 FL (ref 37–54)
DEPRECATED RDW RBC AUTO: 47.9 FL (ref 37–54)
DEPRECATED RDW RBC AUTO: 48.3 FL (ref 37–54)
DEPRECATED RDW RBC AUTO: 52 FL (ref 37–54)
DEPRECATED RDW RBC AUTO: 52.2 FL (ref 37–54)
DEPRECATED RDW RBC AUTO: 53.6 FL (ref 37–54)
DEPRECATED RDW RBC AUTO: 53.7 FL (ref 37–54)
DEPRECATED RDW RBC AUTO: 54.3 FL (ref 37–54)
EOSINOPHIL # BLD AUTO: 0 10*3/MM3 (ref 0–0.4)
EOSINOPHIL # BLD AUTO: 0 10*3/MM3 (ref 0–0.4)
EOSINOPHIL # BLD AUTO: 0.01 10*3/MM3 (ref 0–0.4)
EOSINOPHIL # BLD AUTO: 0.02 10*3/MM3 (ref 0–0.4)
EOSINOPHIL # BLD AUTO: 0.03 10*3/MM3 (ref 0–0.4)
EOSINOPHIL # BLD AUTO: 0.05 10*3/MM3 (ref 0–0.4)
EOSINOPHIL # BLD AUTO: 0.07 10*3/MM3 (ref 0–0.4)
EOSINOPHIL # BLD AUTO: 0.14 10*3/MM3 (ref 0–0.4)
EOSINOPHIL # BLD AUTO: 0.3 10*3/MM3 (ref 0–0.4)
EOSINOPHIL # BLD AUTO: 0.31 10*3/MM3 (ref 0–0.4)
EOSINOPHIL # BLD AUTO: 0.53 10*3/MM3 (ref 0–0.4)
EOSINOPHIL # BLD AUTO: 0.53 10*3/MM3 (ref 0–0.4)
EOSINOPHIL NFR BLD AUTO: 0 % (ref 0.3–6.2)
EOSINOPHIL NFR BLD AUTO: 0 % (ref 0.3–6.2)
EOSINOPHIL NFR BLD AUTO: 0.1 % (ref 0.3–6.2)
EOSINOPHIL NFR BLD AUTO: 0.2 % (ref 0.3–6.2)
EOSINOPHIL NFR BLD AUTO: 0.3 % (ref 0.3–6.2)
EOSINOPHIL NFR BLD AUTO: 0.5 % (ref 0.3–6.2)
EOSINOPHIL NFR BLD AUTO: 1.1 % (ref 0.3–6.2)
EOSINOPHIL NFR BLD AUTO: 1.1 % (ref 0.3–6.2)
EOSINOPHIL NFR BLD AUTO: 3.2 % (ref 0.3–6.2)
EOSINOPHIL NFR BLD AUTO: 3.8 % (ref 0.3–6.2)
EOSINOPHIL NFR BLD AUTO: 4.7 % (ref 0.3–6.2)
EOSINOPHIL NFR BLD AUTO: 6.9 % (ref 0.3–6.2)
ERYTHROCYTE [DISTWIDTH] IN BLOOD BY AUTOMATED COUNT: 13 % (ref 12.3–15.4)
ERYTHROCYTE [DISTWIDTH] IN BLOOD BY AUTOMATED COUNT: 13 % (ref 12.3–15.4)
ERYTHROCYTE [DISTWIDTH] IN BLOOD BY AUTOMATED COUNT: 13.2 % (ref 12.3–15.4)
ERYTHROCYTE [DISTWIDTH] IN BLOOD BY AUTOMATED COUNT: 13.5 % (ref 12.3–15.4)
ERYTHROCYTE [DISTWIDTH] IN BLOOD BY AUTOMATED COUNT: 13.6 % (ref 12.3–15.4)
ERYTHROCYTE [DISTWIDTH] IN BLOOD BY AUTOMATED COUNT: 13.6 % (ref 12.3–15.4)
ERYTHROCYTE [DISTWIDTH] IN BLOOD BY AUTOMATED COUNT: 14.1 % (ref 12.3–15.4)
ERYTHROCYTE [DISTWIDTH] IN BLOOD BY AUTOMATED COUNT: 15.3 % (ref 12.3–15.4)
ERYTHROCYTE [DISTWIDTH] IN BLOOD BY AUTOMATED COUNT: 15.6 % (ref 12.3–15.4)
ERYTHROCYTE [DISTWIDTH] IN BLOOD BY AUTOMATED COUNT: 15.7 % (ref 12.3–15.4)
ERYTHROCYTE [DISTWIDTH] IN BLOOD BY AUTOMATED COUNT: 15.8 % (ref 12.3–15.4)
ERYTHROCYTE [DISTWIDTH] IN BLOOD BY AUTOMATED COUNT: 16 % (ref 12.3–15.4)
ERYTHROCYTE [DISTWIDTH] IN BLOOD BY AUTOMATED COUNT: 17.5 % (ref 12.3–15.4)
ERYTHROCYTE [DISTWIDTH] IN BLOOD BY AUTOMATED COUNT: 17.9 % (ref 12.3–15.4)
ERYTHROCYTE [DISTWIDTH] IN BLOOD BY AUTOMATED COUNT: 18 % (ref 12.3–15.4)
ERYTHROCYTE [DISTWIDTH] IN BLOOD BY AUTOMATED COUNT: 18.3 % (ref 12.3–15.4)
ERYTHROCYTE [DISTWIDTH] IN BLOOD BY AUTOMATED COUNT: 18.4 % (ref 12.3–15.4)
GAS FLOW AIRWAY: 2 LPM
GFR SERPL CREATININE-BSD FRML MDRD: 102 ML/MIN/1.73
GFR SERPL CREATININE-BSD FRML MDRD: 110 ML/MIN/1.73
GFR SERPL CREATININE-BSD FRML MDRD: 112 ML/MIN/1.73
GFR SERPL CREATININE-BSD FRML MDRD: 114 ML/MIN/1.73
GFR SERPL CREATININE-BSD FRML MDRD: 125 ML/MIN/1.73
GFR SERPL CREATININE-BSD FRML MDRD: 130 ML/MIN/1.73
GFR SERPL CREATININE-BSD FRML MDRD: 138 ML/MIN/1.73
GFR SERPL CREATININE-BSD FRML MDRD: 141 ML/MIN/1.73
GFR SERPL CREATININE-BSD FRML MDRD: 144 ML/MIN/1.73
GFR SERPL CREATININE-BSD FRML MDRD: 59 ML/MIN/1.73
GFR SERPL CREATININE-BSD FRML MDRD: 61 ML/MIN/1.73
GFR SERPL CREATININE-BSD FRML MDRD: 63 ML/MIN/1.73
GFR SERPL CREATININE-BSD FRML MDRD: 63 ML/MIN/1.73
GFR SERPL CREATININE-BSD FRML MDRD: 64 ML/MIN/1.73
GFR SERPL CREATININE-BSD FRML MDRD: 65 ML/MIN/1.73
GFR SERPL CREATININE-BSD FRML MDRD: 67 ML/MIN/1.73
GFR SERPL CREATININE-BSD FRML MDRD: 67 ML/MIN/1.73
GFR SERPL CREATININE-BSD FRML MDRD: 71 ML/MIN/1.73
GFR SERPL CREATININE-BSD FRML MDRD: 72 ML/MIN/1.73
GFR SERPL CREATININE-BSD FRML MDRD: 73 ML/MIN/1.73
GFR SERPL CREATININE-BSD FRML MDRD: 74 ML/MIN/1.73
GFR SERPL CREATININE-BSD FRML MDRD: 75 ML/MIN/1.73
GFR SERPL CREATININE-BSD FRML MDRD: 76 ML/MIN/1.73
GFR SERPL CREATININE-BSD FRML MDRD: 77 ML/MIN/1.73
GFR SERPL CREATININE-BSD FRML MDRD: 81 ML/MIN/1.73
GFR SERPL CREATININE-BSD FRML MDRD: 83 ML/MIN/1.73
GFR SERPL CREATININE-BSD FRML MDRD: 83 ML/MIN/1.73
GFR SERPL CREATININE-BSD FRML MDRD: 84 ML/MIN/1.73
GFR SERPL CREATININE-BSD FRML MDRD: 84 ML/MIN/1.73
GFR SERPL CREATININE-BSD FRML MDRD: 85 ML/MIN/1.73
GFR SERPL CREATININE-BSD FRML MDRD: 88 ML/MIN/1.73
GFR SERPL CREATININE-BSD FRML MDRD: 99 ML/MIN/1.73
GFR SERPL CREATININE-BSD FRML MDRD: >150 ML/MIN/1.73
GFR SERPL CREATININE-BSD FRML MDRD: >150 ML/MIN/1.73
GLOBULIN UR ELPH-MCNC: 1.9 GM/DL
GLOBULIN UR ELPH-MCNC: 2 GM/DL
GLOBULIN UR ELPH-MCNC: 2.1 GM/DL
GLOBULIN UR ELPH-MCNC: 2.2 GM/DL
GLOBULIN UR ELPH-MCNC: 2.2 GM/DL
GLOBULIN UR ELPH-MCNC: 2.4 GM/DL
GLOBULIN UR ELPH-MCNC: 2.6 GM/DL
GLOBULIN UR ELPH-MCNC: 2.6 GM/DL
GLOBULIN UR ELPH-MCNC: 2.7 GM/DL
GLOBULIN UR ELPH-MCNC: 2.8 GM/DL
GLOBULIN UR ELPH-MCNC: 3.5 GM/DL
GLUCOSE BLDC GLUCOMTR-MCNC: 107 MG/DL (ref 70–130)
GLUCOSE SERPL-MCNC: 102 MG/DL (ref 65–99)
GLUCOSE SERPL-MCNC: 104 MG/DL (ref 65–99)
GLUCOSE SERPL-MCNC: 104 MG/DL (ref 65–99)
GLUCOSE SERPL-MCNC: 107 MG/DL (ref 65–99)
GLUCOSE SERPL-MCNC: 108 MG/DL (ref 65–99)
GLUCOSE SERPL-MCNC: 109 MG/DL (ref 65–99)
GLUCOSE SERPL-MCNC: 110 MG/DL (ref 65–99)
GLUCOSE SERPL-MCNC: 111 MG/DL (ref 65–99)
GLUCOSE SERPL-MCNC: 113 MG/DL (ref 65–99)
GLUCOSE SERPL-MCNC: 113 MG/DL (ref 65–99)
GLUCOSE SERPL-MCNC: 114 MG/DL (ref 65–99)
GLUCOSE SERPL-MCNC: 115 MG/DL (ref 65–99)
GLUCOSE SERPL-MCNC: 116 MG/DL (ref 65–99)
GLUCOSE SERPL-MCNC: 118 MG/DL (ref 65–99)
GLUCOSE SERPL-MCNC: 119 MG/DL (ref 65–99)
GLUCOSE SERPL-MCNC: 120 MG/DL (ref 65–99)
GLUCOSE SERPL-MCNC: 123 MG/DL (ref 65–99)
GLUCOSE SERPL-MCNC: 124 MG/DL (ref 65–99)
GLUCOSE SERPL-MCNC: 125 MG/DL (ref 65–99)
GLUCOSE SERPL-MCNC: 131 MG/DL (ref 65–99)
GLUCOSE SERPL-MCNC: 131 MG/DL (ref 65–99)
GLUCOSE SERPL-MCNC: 136 MG/DL (ref 65–99)
GLUCOSE SERPL-MCNC: 144 MG/DL (ref 65–99)
GLUCOSE SERPL-MCNC: 147 MG/DL (ref 65–99)
GLUCOSE SERPL-MCNC: 86 MG/DL (ref 65–99)
GLUCOSE SERPL-MCNC: 89 MG/DL (ref 65–99)
GLUCOSE SERPL-MCNC: 91 MG/DL (ref 65–99)
GLUCOSE SERPL-MCNC: 94 MG/DL (ref 65–99)
GLUCOSE SERPL-MCNC: 96 MG/DL (ref 65–99)
GLUCOSE SERPL-MCNC: 98 MG/DL (ref 65–99)
GLUCOSE SERPL-MCNC: 99 MG/DL (ref 65–99)
GLUCOSE SERPL-MCNC: 99 MG/DL (ref 65–99)
GLUCOSE UR STRIP-MCNC: NEGATIVE MG/DL
HAV IGM SERPL QL IA: NORMAL
HBA1C MFR BLD: 5.4 % (ref 4.8–5.6)
HBA1C MFR BLD: 5.6 % (ref 4.8–5.6)
HBV CORE IGM SERPL QL IA: NORMAL
HBV SURFACE AG SERPL QL IA: NORMAL
HCO3 BLDA-SCNC: 31.3 MMOL/L (ref 20–26)
HCT VFR BLD AUTO: 28.7 % (ref 37.5–51)
HCT VFR BLD AUTO: 28.9 % (ref 37.5–51)
HCT VFR BLD AUTO: 31.4 % (ref 37.5–51)
HCT VFR BLD AUTO: 31.8 % (ref 37.5–51)
HCT VFR BLD AUTO: 32 % (ref 37.5–51)
HCT VFR BLD AUTO: 32.5 % (ref 37.5–51)
HCT VFR BLD AUTO: 34.7 % (ref 37.5–51)
HCT VFR BLD AUTO: 35.1 % (ref 37.5–51)
HCT VFR BLD AUTO: 37.7 % (ref 37.5–51)
HCT VFR BLD AUTO: 37.8 % (ref 37.5–51)
HCT VFR BLD AUTO: 37.9 % (ref 37.5–51)
HCT VFR BLD AUTO: 38.4 % (ref 37.5–51)
HCT VFR BLD AUTO: 38.5 % (ref 37.5–51)
HCT VFR BLD AUTO: 39.6 % (ref 37.5–51)
HCT VFR BLD AUTO: 39.8 % (ref 37.5–51)
HCT VFR BLD AUTO: 39.9 % (ref 37.5–51)
HCT VFR BLD AUTO: 42 % (ref 37.5–51)
HCT VFR BLD AUTO: 42 % (ref 37.5–51)
HCT VFR BLD AUTO: 43.5 % (ref 37.5–51)
HCT VFR BLD AUTO: 44.4 % (ref 37.5–51)
HCV AB SER DONR QL: NORMAL
HDLC SERPL-MCNC: 34 MG/DL (ref 40–60)
HDLC SERPL-MCNC: 37 MG/DL (ref 40–60)
HGB BLD-MCNC: 10.4 G/DL (ref 13–17.7)
HGB BLD-MCNC: 10.6 G/DL (ref 13–17.7)
HGB BLD-MCNC: 10.8 G/DL (ref 13–17.7)
HGB BLD-MCNC: 11.1 G/DL (ref 13–17.7)
HGB BLD-MCNC: 11.3 G/DL (ref 13–17.7)
HGB BLD-MCNC: 11.3 G/DL (ref 13–17.7)
HGB BLD-MCNC: 12.2 G/DL (ref 13–17.7)
HGB BLD-MCNC: 12.2 G/DL (ref 13–17.7)
HGB BLD-MCNC: 12.3 G/DL (ref 13–17.7)
HGB BLD-MCNC: 12.3 G/DL (ref 13–17.7)
HGB BLD-MCNC: 13 G/DL (ref 13–17.7)
HGB BLD-MCNC: 13.2 G/DL (ref 13–17.7)
HGB BLD-MCNC: 13.6 G/DL (ref 13–17.7)
HGB BLD-MCNC: 13.6 G/DL (ref 13–17.7)
HGB BLD-MCNC: 13.9 G/DL (ref 13–17.7)
HGB BLD-MCNC: 13.9 G/DL (ref 13–17.7)
HGB BLD-MCNC: 14.2 G/DL (ref 13–17.7)
HGB BLD-MCNC: 15 G/DL (ref 13–17.7)
HGB BLD-MCNC: 9.5 G/DL (ref 13–17.7)
HGB BLD-MCNC: 9.8 G/DL (ref 13–17.7)
HGB UR QL STRIP.AUTO: ABNORMAL
HGB UR QL STRIP.AUTO: NEGATIVE
HOLD SPECIMEN: NORMAL
HYALINE CASTS UR QL AUTO: ABNORMAL /LPF
HYALINE CASTS UR QL AUTO: ABNORMAL /LPF
IMM GRANULOCYTES # BLD AUTO: 0.03 10*3/MM3 (ref 0–0.05)
IMM GRANULOCYTES # BLD AUTO: 0.04 10*3/MM3 (ref 0–0.05)
IMM GRANULOCYTES # BLD AUTO: 0.04 10*3/MM3 (ref 0–0.05)
IMM GRANULOCYTES # BLD AUTO: 0.05 10*3/MM3 (ref 0–0.05)
IMM GRANULOCYTES # BLD AUTO: 0.06 10*3/MM3 (ref 0–0.05)
IMM GRANULOCYTES # BLD AUTO: 0.06 10*3/MM3 (ref 0–0.05)
IMM GRANULOCYTES # BLD AUTO: 0.07 10*3/MM3 (ref 0–0.05)
IMM GRANULOCYTES # BLD AUTO: 0.11 10*3/MM3 (ref 0–0.05)
IMM GRANULOCYTES # BLD AUTO: 0.12 10*3/MM3 (ref 0–0.05)
IMM GRANULOCYTES # BLD AUTO: 0.15 10*3/MM3 (ref 0–0.05)
IMM GRANULOCYTES NFR BLD AUTO: 0.3 % (ref 0–0.5)
IMM GRANULOCYTES NFR BLD AUTO: 0.4 % (ref 0–0.5)
IMM GRANULOCYTES NFR BLD AUTO: 0.4 % (ref 0–0.5)
IMM GRANULOCYTES NFR BLD AUTO: 0.5 % (ref 0–0.5)
IMM GRANULOCYTES NFR BLD AUTO: 0.7 % (ref 0–0.5)
IMM GRANULOCYTES NFR BLD AUTO: 0.8 % (ref 0–0.5)
IMM GRANULOCYTES NFR BLD AUTO: 0.8 % (ref 0–0.5)
IMM GRANULOCYTES NFR BLD AUTO: 0.9 % (ref 0–0.5)
INR PPP: 1.55 (ref 0.91–1.09)
INR PPP: 1.56 (ref 0.91–1.09)
INR PPP: 1.76 (ref 0.91–1.09)
INR PPP: 1.82 (ref 0.91–1.09)
INR PPP: 2.19 (ref 0.91–1.09)
INR PPP: 2.3 (ref 0.91–1.09)
INR PPP: 2.36 (ref 0.91–1.09)
INR PPP: 3.34 (ref 0.91–1.09)
INR PPP: 4.33 (ref 0.91–1.09)
KETONES UR QL STRIP: ABNORMAL
KETONES UR QL STRIP: NEGATIVE
LAB AP CASE REPORT: NORMAL
LDLC SERPL CALC-MCNC: 53 MG/DL (ref 0–100)
LDLC SERPL CALC-MCNC: 77 MG/DL (ref 0–100)
LDLC/HDLC SERPL: 1.55 {RATIO}
LDLC/HDLC SERPL: 2.1 {RATIO}
LEFT ATRIUM VOLUME INDEX: 25.7 ML/M2
LEFT ATRIUM VOLUME: 57.8 CM3
LEUKOCYTE ESTERASE UR QL STRIP.AUTO: ABNORMAL
LEUKOCYTE ESTERASE UR QL STRIP.AUTO: ABNORMAL
LEUKOCYTE ESTERASE UR QL STRIP.AUTO: NEGATIVE
LIPASE SERPL-CCNC: 42 U/L (ref 13–60)
LV EF 2D ECHO EST: 24 %
LV EF NUC BP: 21 %
LYMPHOCYTES # BLD AUTO: 0.16 10*3/MM3 (ref 0.7–3.1)
LYMPHOCYTES # BLD AUTO: 0.2 10*3/MM3 (ref 0.7–3.1)
LYMPHOCYTES # BLD AUTO: 0.26 10*3/MM3 (ref 0.7–3.1)
LYMPHOCYTES # BLD AUTO: 0.56 10*3/MM3 (ref 0.7–3.1)
LYMPHOCYTES # BLD AUTO: 0.56 10*3/MM3 (ref 0.7–3.1)
LYMPHOCYTES # BLD AUTO: 0.66 10*3/MM3 (ref 0.7–3.1)
LYMPHOCYTES # BLD AUTO: 0.74 10*3/MM3 (ref 0.7–3.1)
LYMPHOCYTES # BLD AUTO: 0.75 10*3/MM3 (ref 0.7–3.1)
LYMPHOCYTES # BLD AUTO: 0.76 10*3/MM3 (ref 0.7–3.1)
LYMPHOCYTES # BLD AUTO: 0.77 10*3/MM3 (ref 0.7–3.1)
LYMPHOCYTES # BLD AUTO: 1.02 10*3/MM3 (ref 0.7–3.1)
LYMPHOCYTES # BLD AUTO: 1.08 10*3/MM3 (ref 0.7–3.1)
LYMPHOCYTES # BLD AUTO: 1.1 10*3/MM3 (ref 0.7–3.1)
LYMPHOCYTES # BLD AUTO: 1.38 10*3/MM3 (ref 0.7–3.1)
LYMPHOCYTES NFR BLD AUTO: 1 % (ref 19.6–45.3)
LYMPHOCYTES NFR BLD AUTO: 1.5 % (ref 19.6–45.3)
LYMPHOCYTES NFR BLD AUTO: 1.7 % (ref 19.6–45.3)
LYMPHOCYTES NFR BLD AUTO: 10.4 % (ref 19.6–45.3)
LYMPHOCYTES NFR BLD AUTO: 12.2 % (ref 19.6–45.3)
LYMPHOCYTES NFR BLD AUTO: 13.3 % (ref 19.6–45.3)
LYMPHOCYTES NFR BLD AUTO: 17.9 % (ref 19.6–45.3)
LYMPHOCYTES NFR BLD AUTO: 5.3 % (ref 19.6–45.3)
LYMPHOCYTES NFR BLD AUTO: 5.3 % (ref 19.6–45.3)
LYMPHOCYTES NFR BLD AUTO: 6.1 % (ref 19.6–45.3)
LYMPHOCYTES NFR BLD AUTO: 6.9 % (ref 19.6–45.3)
LYMPHOCYTES NFR BLD AUTO: 8.1 % (ref 19.6–45.3)
LYMPHOCYTES NFR BLD AUTO: 8.1 % (ref 19.6–45.3)
LYMPHOCYTES NFR BLD AUTO: 9.1 % (ref 19.6–45.3)
Lab: ABNORMAL
MAGNESIUM SERPL-MCNC: 1.9 MG/DL (ref 1.6–2.4)
MAGNESIUM SERPL-MCNC: 2.1 MG/DL (ref 1.6–2.4)
MAGNESIUM SERPL-MCNC: 2.2 MG/DL (ref 1.6–2.4)
MAGNESIUM SERPL-MCNC: 2.4 MG/DL (ref 1.6–2.4)
MAXIMAL PREDICTED HEART RATE: 133 BPM
MCH RBC QN AUTO: 26.8 PG (ref 26.6–33)
MCH RBC QN AUTO: 26.9 PG (ref 26.6–33)
MCH RBC QN AUTO: 27 PG (ref 26.6–33)
MCH RBC QN AUTO: 27 PG (ref 26.6–33)
MCH RBC QN AUTO: 27.1 PG (ref 26.6–33)
MCH RBC QN AUTO: 27.3 PG (ref 26.6–33)
MCH RBC QN AUTO: 27.6 PG (ref 26.6–33)
MCH RBC QN AUTO: 27.8 PG (ref 26.6–33)
MCH RBC QN AUTO: 28 PG (ref 26.6–33)
MCH RBC QN AUTO: 28.1 PG (ref 26.6–33)
MCH RBC QN AUTO: 28.1 PG (ref 26.6–33)
MCH RBC QN AUTO: 28.2 PG (ref 26.6–33)
MCH RBC QN AUTO: 28.3 PG (ref 26.6–33)
MCH RBC QN AUTO: 28.5 PG (ref 26.6–33)
MCH RBC QN AUTO: 28.7 PG (ref 26.6–33)
MCHC RBC AUTO-ENTMCNC: 31.3 G/DL (ref 31.5–35.7)
MCHC RBC AUTO-ENTMCNC: 31.8 G/DL (ref 31.5–35.7)
MCHC RBC AUTO-ENTMCNC: 31.9 G/DL (ref 31.5–35.7)
MCHC RBC AUTO-ENTMCNC: 32.2 G/DL (ref 31.5–35.7)
MCHC RBC AUTO-ENTMCNC: 32.2 G/DL (ref 31.5–35.7)
MCHC RBC AUTO-ENTMCNC: 32.6 G/DL (ref 31.5–35.7)
MCHC RBC AUTO-ENTMCNC: 32.6 G/DL (ref 31.5–35.7)
MCHC RBC AUTO-ENTMCNC: 32.9 G/DL (ref 31.5–35.7)
MCHC RBC AUTO-ENTMCNC: 33.1 G/DL (ref 31.5–35.7)
MCHC RBC AUTO-ENTMCNC: 33.2 G/DL (ref 31.5–35.7)
MCHC RBC AUTO-ENTMCNC: 33.3 G/DL (ref 31.5–35.7)
MCHC RBC AUTO-ENTMCNC: 33.8 G/DL (ref 31.5–35.7)
MCHC RBC AUTO-ENTMCNC: 34.1 G/DL (ref 31.5–35.7)
MCHC RBC AUTO-ENTMCNC: 34.2 G/DL (ref 31.5–35.7)
MCHC RBC AUTO-ENTMCNC: 34.3 G/DL (ref 31.5–35.7)
MCHC RBC AUTO-ENTMCNC: 34.4 G/DL (ref 31.5–35.7)
MCHC RBC AUTO-ENTMCNC: 34.8 G/DL (ref 31.5–35.7)
MCV RBC AUTO: 81.3 FL (ref 79–97)
MCV RBC AUTO: 81.4 FL (ref 79–97)
MCV RBC AUTO: 81.6 FL (ref 79–97)
MCV RBC AUTO: 81.9 FL (ref 79–97)
MCV RBC AUTO: 82.2 FL (ref 79–97)
MCV RBC AUTO: 82.8 FL (ref 79–97)
MCV RBC AUTO: 82.8 FL (ref 79–97)
MCV RBC AUTO: 83 FL (ref 79–97)
MCV RBC AUTO: 83.3 FL (ref 79–97)
MCV RBC AUTO: 83.4 FL (ref 79–97)
MCV RBC AUTO: 83.7 FL (ref 79–97)
MCV RBC AUTO: 84 FL (ref 79–97)
MCV RBC AUTO: 84.2 FL (ref 79–97)
MCV RBC AUTO: 84.3 FL (ref 79–97)
MCV RBC AUTO: 84.4 FL (ref 79–97)
MCV RBC AUTO: 84.5 FL (ref 79–97)
MCV RBC AUTO: 85.4 FL (ref 79–97)
MCV RBC AUTO: 85.5 FL (ref 79–97)
MCV RBC AUTO: 85.6 FL (ref 79–97)
MODALITY: ABNORMAL
MONOCYTES # BLD AUTO: 0.84 10*3/MM3 (ref 0.1–0.9)
MONOCYTES # BLD AUTO: 0.89 10*3/MM3 (ref 0.1–0.9)
MONOCYTES # BLD AUTO: 1.15 10*3/MM3 (ref 0.1–0.9)
MONOCYTES # BLD AUTO: 1.17 10*3/MM3 (ref 0.1–0.9)
MONOCYTES # BLD AUTO: 1.2 10*3/MM3 (ref 0.1–0.9)
MONOCYTES # BLD AUTO: 1.22 10*3/MM3 (ref 0.1–0.9)
MONOCYTES # BLD AUTO: 1.25 10*3/MM3 (ref 0.1–0.9)
MONOCYTES # BLD AUTO: 1.35 10*3/MM3 (ref 0.1–0.9)
MONOCYTES # BLD AUTO: 1.43 10*3/MM3 (ref 0.1–0.9)
MONOCYTES # BLD AUTO: 1.48 10*3/MM3 (ref 0.1–0.9)
MONOCYTES # BLD AUTO: 1.49 10*3/MM3 (ref 0.1–0.9)
MONOCYTES # BLD AUTO: 1.63 10*3/MM3 (ref 0.1–0.9)
MONOCYTES # BLD AUTO: 1.96 10*3/MM3 (ref 0.1–0.9)
MONOCYTES # BLD AUTO: 2.4 10*3/MM3 (ref 0.1–0.9)
MONOCYTES NFR BLD AUTO: 10.9 % (ref 5–12)
MONOCYTES NFR BLD AUTO: 10.9 % (ref 5–12)
MONOCYTES NFR BLD AUTO: 11.6 % (ref 5–12)
MONOCYTES NFR BLD AUTO: 12.5 % (ref 5–12)
MONOCYTES NFR BLD AUTO: 12.7 % (ref 5–12)
MONOCYTES NFR BLD AUTO: 13.1 % (ref 5–12)
MONOCYTES NFR BLD AUTO: 13.3 % (ref 5–12)
MONOCYTES NFR BLD AUTO: 13.8 % (ref 5–12)
MONOCYTES NFR BLD AUTO: 14 % (ref 5–12)
MONOCYTES NFR BLD AUTO: 14.2 % (ref 5–12)
MONOCYTES NFR BLD AUTO: 14.2 % (ref 5–12)
MONOCYTES NFR BLD AUTO: 14.3 % (ref 5–12)
MONOCYTES NFR BLD AUTO: 15.5 % (ref 5–12)
MONOCYTES NFR BLD AUTO: 9.8 % (ref 5–12)
NEUTROPHILS NFR BLD AUTO: 11.49 10*3/MM3 (ref 1.7–7)
NEUTROPHILS NFR BLD AUTO: 13.19 10*3/MM3 (ref 1.7–7)
NEUTROPHILS NFR BLD AUTO: 14.1 10*3/MM3 (ref 1.7–7)
NEUTROPHILS NFR BLD AUTO: 4.34 10*3/MM3 (ref 1.7–7)
NEUTROPHILS NFR BLD AUTO: 4.54 10*3/MM3 (ref 1.7–7)
NEUTROPHILS NFR BLD AUTO: 5.77 10*3/MM3 (ref 1.7–7)
NEUTROPHILS NFR BLD AUTO: 58.8 % (ref 42.7–76)
NEUTROPHILS NFR BLD AUTO: 7.14 10*3/MM3 (ref 1.7–7)
NEUTROPHILS NFR BLD AUTO: 7.32 10*3/MM3 (ref 1.7–7)
NEUTROPHILS NFR BLD AUTO: 7.35 10*3/MM3 (ref 1.7–7)
NEUTROPHILS NFR BLD AUTO: 7.93 10*3/MM3 (ref 1.7–7)
NEUTROPHILS NFR BLD AUTO: 70.9 % (ref 42.7–76)
NEUTROPHILS NFR BLD AUTO: 71.3 % (ref 42.7–76)
NEUTROPHILS NFR BLD AUTO: 72.7 % (ref 42.7–76)
NEUTROPHILS NFR BLD AUTO: 74.7 % (ref 42.7–76)
NEUTROPHILS NFR BLD AUTO: 75 % (ref 42.7–76)
NEUTROPHILS NFR BLD AUTO: 77.5 % (ref 42.7–76)
NEUTROPHILS NFR BLD AUTO: 78.6 % (ref 42.7–76)
NEUTROPHILS NFR BLD AUTO: 78.8 % (ref 42.7–76)
NEUTROPHILS NFR BLD AUTO: 8.16 10*3/MM3 (ref 1.7–7)
NEUTROPHILS NFR BLD AUTO: 8.62 10*3/MM3 (ref 1.7–7)
NEUTROPHILS NFR BLD AUTO: 8.79 10*3/MM3 (ref 1.7–7)
NEUTROPHILS NFR BLD AUTO: 82.1 % (ref 42.7–76)
NEUTROPHILS NFR BLD AUTO: 83.1 % (ref 42.7–76)
NEUTROPHILS NFR BLD AUTO: 83.4 % (ref 42.7–76)
NEUTROPHILS NFR BLD AUTO: 83.7 % (ref 42.7–76)
NEUTROPHILS NFR BLD AUTO: 87.4 % (ref 42.7–76)
NEUTROPHILS NFR BLD AUTO: 9.69 10*3/MM3 (ref 1.7–7)
NITRITE UR QL STRIP: NEGATIVE
NRBC BLD AUTO-RTO: 0 /100 WBC (ref 0–0.2)
NT-PROBNP SERPL-MCNC: 5050 PG/ML (ref 0–1800)
NT-PROBNP SERPL-MCNC: ABNORMAL PG/ML (ref 0–1800)
OSMOLALITY UR: 255 MOSM/KG (ref 601–850)
PATH REPORT.FINAL DX SPEC: NORMAL
PATH REPORT.GROSS SPEC: NORMAL
PCO2 BLDA: 44.6 MM HG (ref 35–45)
PCO2 TEMP ADJ BLD: 44.6 MM HG (ref 35–45)
PH BLDA: 7.46 PH UNITS (ref 7.35–7.45)
PH UR STRIP.AUTO: 6 [PH] (ref 5–8)
PH UR STRIP.AUTO: 6.5 [PH] (ref 5–8)
PH UR STRIP.AUTO: 6.5 [PH] (ref 5–8)
PH UR STRIP.AUTO: 7 [PH] (ref 5–8)
PH UR STRIP.AUTO: 7 [PH] (ref 5–8)
PH, TEMP CORRECTED: 7.46 PH UNITS (ref 7.35–7.45)
PHOSPHATE SERPL-MCNC: 3.5 MG/DL (ref 2.5–4.5)
PLATELET # BLD AUTO: 142 10*3/MM3 (ref 140–450)
PLATELET # BLD AUTO: 172 10*3/MM3 (ref 140–450)
PLATELET # BLD AUTO: 184 10*3/MM3 (ref 140–450)
PLATELET # BLD AUTO: 195 10*3/MM3 (ref 140–450)
PLATELET # BLD AUTO: 202 10*3/MM3 (ref 140–450)
PLATELET # BLD AUTO: 213 10*3/MM3 (ref 140–450)
PLATELET # BLD AUTO: 215 10*3/MM3 (ref 140–450)
PLATELET # BLD AUTO: 223 10*3/MM3 (ref 140–450)
PLATELET # BLD AUTO: 248 10*3/MM3 (ref 140–450)
PLATELET # BLD AUTO: 251 10*3/MM3 (ref 140–450)
PLATELET # BLD AUTO: 254 10*3/MM3 (ref 140–450)
PLATELET # BLD AUTO: 259 10*3/MM3 (ref 140–450)
PLATELET # BLD AUTO: 265 10*3/MM3 (ref 140–450)
PLATELET # BLD AUTO: 266 10*3/MM3 (ref 140–450)
PLATELET # BLD AUTO: 279 10*3/MM3 (ref 140–450)
PLATELET # BLD AUTO: 281 10*3/MM3 (ref 140–450)
PLATELET # BLD AUTO: 299 10*3/MM3 (ref 140–450)
PLATELET # BLD AUTO: 301 10*3/MM3 (ref 140–450)
PLATELET # BLD AUTO: 307 10*3/MM3 (ref 140–450)
PLATELET # BLD AUTO: 313 10*3/MM3 (ref 140–450)
PMV BLD AUTO: 10 FL (ref 6–12)
PMV BLD AUTO: 8.6 FL (ref 6–12)
PMV BLD AUTO: 8.8 FL (ref 6–12)
PMV BLD AUTO: 8.9 FL (ref 6–12)
PMV BLD AUTO: 9 FL (ref 6–12)
PMV BLD AUTO: 9 FL (ref 6–12)
PMV BLD AUTO: 9.1 FL (ref 6–12)
PMV BLD AUTO: 9.2 FL (ref 6–12)
PMV BLD AUTO: 9.3 FL (ref 6–12)
PMV BLD AUTO: 9.3 FL (ref 6–12)
PMV BLD AUTO: 9.4 FL (ref 6–12)
PMV BLD AUTO: 9.4 FL (ref 6–12)
PMV BLD AUTO: 9.5 FL (ref 6–12)
PMV BLD AUTO: 9.5 FL (ref 6–12)
PMV BLD AUTO: 9.6 FL (ref 6–12)
PMV BLD AUTO: 9.8 FL (ref 6–12)
PMV BLD AUTO: 9.8 FL (ref 6–12)
PO2 BLDA: 103 MM HG (ref 83–108)
PO2 TEMP ADJ BLD: 103 MM HG (ref 83–108)
POTASSIUM SERPL-SCNC: 3.1 MMOL/L (ref 3.5–5.2)
POTASSIUM SERPL-SCNC: 3.2 MMOL/L (ref 3.5–5.2)
POTASSIUM SERPL-SCNC: 3.3 MMOL/L (ref 3.5–5.2)
POTASSIUM SERPL-SCNC: 3.3 MMOL/L (ref 3.5–5.2)
POTASSIUM SERPL-SCNC: 3.5 MMOL/L (ref 3.5–5.2)
POTASSIUM SERPL-SCNC: 3.6 MMOL/L (ref 3.5–5.2)
POTASSIUM SERPL-SCNC: 3.7 MMOL/L (ref 3.5–5.2)
POTASSIUM SERPL-SCNC: 3.7 MMOL/L (ref 3.5–5.2)
POTASSIUM SERPL-SCNC: 3.8 MMOL/L (ref 3.5–5.2)
POTASSIUM SERPL-SCNC: 3.9 MMOL/L (ref 3.5–5.2)
POTASSIUM SERPL-SCNC: 4.1 MMOL/L (ref 3.5–5.2)
POTASSIUM SERPL-SCNC: 4.1 MMOL/L (ref 3.5–5.2)
POTASSIUM SERPL-SCNC: 4.2 MMOL/L (ref 3.5–5.2)
POTASSIUM SERPL-SCNC: 4.2 MMOL/L (ref 3.5–5.2)
POTASSIUM SERPL-SCNC: 4.3 MMOL/L (ref 3.5–5.2)
POTASSIUM SERPL-SCNC: 4.5 MMOL/L (ref 3.5–5.2)
POTASSIUM SERPL-SCNC: 4.5 MMOL/L (ref 3.5–5.2)
POTASSIUM SERPL-SCNC: 4.6 MMOL/L (ref 3.5–5.2)
POTASSIUM SERPL-SCNC: 4.7 MMOL/L (ref 3.5–5.2)
POTASSIUM SERPL-SCNC: 4.7 MMOL/L (ref 3.5–5.2)
POTASSIUM SERPL-SCNC: 4.8 MMOL/L (ref 3.5–5.2)
POTASSIUM SERPL-SCNC: 5 MMOL/L (ref 3.5–5.2)
POTASSIUM SERPL-SCNC: 5.1 MMOL/L (ref 3.5–5.2)
POTASSIUM SERPL-SCNC: 5.2 MMOL/L (ref 3.5–5.2)
PREALB SERPL-MCNC: 8.9 MG/DL (ref 20–40)
PROT SERPL-MCNC: 4.9 G/DL (ref 6–8.5)
PROT SERPL-MCNC: 5 G/DL (ref 6–8.5)
PROT SERPL-MCNC: 5.1 G/DL (ref 6–8.5)
PROT SERPL-MCNC: 5.3 G/DL (ref 6–8.5)
PROT SERPL-MCNC: 5.6 G/DL (ref 6–8.5)
PROT SERPL-MCNC: 5.8 G/DL (ref 6–8.5)
PROT SERPL-MCNC: 5.9 G/DL (ref 6–8.5)
PROT SERPL-MCNC: 6 G/DL (ref 6–8.5)
PROT SERPL-MCNC: 6 G/DL (ref 6–8.5)
PROT SERPL-MCNC: 6.2 G/DL (ref 6–8.5)
PROT SERPL-MCNC: 6.2 G/DL (ref 6–8.5)
PROT SERPL-MCNC: 6.8 G/DL (ref 6–8.5)
PROT UR QL STRIP: ABNORMAL
PROT UR QL STRIP: ABNORMAL
PROT UR QL STRIP: NEGATIVE
PROTHROMBIN TIME: 18.3 SECONDS (ref 11.9–14.6)
PROTHROMBIN TIME: 18.4 SECONDS (ref 11.9–14.6)
PROTHROMBIN TIME: 20.3 SECONDS (ref 11.9–14.6)
PROTHROMBIN TIME: 20.8 SECONDS (ref 11.9–14.6)
PROTHROMBIN TIME: 24.2 SECONDS (ref 11.9–14.6)
PROTHROMBIN TIME: 25.2 SECONDS (ref 11.9–14.6)
PROTHROMBIN TIME: 25.7 SECONDS (ref 11.9–14.6)
PROTHROMBIN TIME: 34.1 SECONDS (ref 11.9–14.6)
PROTHROMBIN TIME: 42.1 SECONDS (ref 11.9–14.6)
QT INTERVAL: 462 MS
QT INTERVAL: 476 MS
QT INTERVAL: 498 MS
QTC INTERVAL: 519 MS
QTC INTERVAL: 533 MS
QTC INTERVAL: 552 MS
RBC # BLD AUTO: 3.49 10*6/MM3 (ref 4.14–5.8)
RBC # BLD AUTO: 3.55 10*6/MM3 (ref 4.14–5.8)
RBC # BLD AUTO: 3.77 10*6/MM3 (ref 4.14–5.8)
RBC # BLD AUTO: 3.92 10*6/MM3 (ref 4.14–5.8)
RBC # BLD AUTO: 4 10*6/MM3 (ref 4.14–5.8)
RBC # BLD AUTO: 4.18 10*6/MM3 (ref 4.14–5.8)
RBC # BLD AUTO: 4.19 10*6/MM3 (ref 4.14–5.8)
RBC # BLD AUTO: 4.51 10*6/MM3 (ref 4.14–5.8)
RBC # BLD AUTO: 4.53 10*6/MM3 (ref 4.14–5.8)
RBC # BLD AUTO: 4.53 10*6/MM3 (ref 4.14–5.8)
RBC # BLD AUTO: 4.54 10*6/MM3 (ref 4.14–5.8)
RBC # BLD AUTO: 4.56 10*6/MM3 (ref 4.14–5.8)
RBC # BLD AUTO: 4.71 10*6/MM3 (ref 4.14–5.8)
RBC # BLD AUTO: 4.78 10*6/MM3 (ref 4.14–5.8)
RBC # BLD AUTO: 4.87 10*6/MM3 (ref 4.14–5.8)
RBC # BLD AUTO: 4.91 10*6/MM3 (ref 4.14–5.8)
RBC # BLD AUTO: 5.04 10*6/MM3 (ref 4.14–5.8)
RBC # BLD AUTO: 5.08 10*6/MM3 (ref 4.14–5.8)
RBC # BLD AUTO: 5.27 10*6/MM3 (ref 4.14–5.8)
RBC # UR: ABNORMAL /HPF
RBC # UR: ABNORMAL /HPF
REF LAB TEST METHOD: ABNORMAL
REF LAB TEST METHOD: ABNORMAL
RH BLD: POSITIVE
RH BLD: POSITIVE
SAO2 % BLDCOA: 98.5 % (ref 94–99)
SARS-COV-2 RDRP RESP QL NAA+PROBE: NORMAL
SARS-COV-2 RDRP RESP QL NAA+PROBE: NOT DETECTED
SARS-COV-2 RNA PNL SPEC NAA+PROBE: NOT DETECTED
SARS-COV-2 RNA RESP QL NAA+PROBE: NOT DETECTED
SODIUM SERPL-SCNC: 115 MMOL/L (ref 136–145)
SODIUM SERPL-SCNC: 116 MMOL/L (ref 136–145)
SODIUM SERPL-SCNC: 117 MMOL/L (ref 136–145)
SODIUM SERPL-SCNC: 118 MMOL/L (ref 136–145)
SODIUM SERPL-SCNC: 119 MMOL/L (ref 136–145)
SODIUM SERPL-SCNC: 125 MMOL/L (ref 136–145)
SODIUM SERPL-SCNC: 126 MMOL/L (ref 136–145)
SODIUM SERPL-SCNC: 128 MMOL/L (ref 136–145)
SODIUM SERPL-SCNC: 129 MMOL/L (ref 136–145)
SODIUM SERPL-SCNC: 129 MMOL/L (ref 136–145)
SODIUM SERPL-SCNC: 130 MMOL/L (ref 136–145)
SODIUM SERPL-SCNC: 130 MMOL/L (ref 136–145)
SODIUM SERPL-SCNC: 131 MMOL/L (ref 136–145)
SODIUM SERPL-SCNC: 131 MMOL/L (ref 136–145)
SODIUM SERPL-SCNC: 132 MMOL/L (ref 136–145)
SODIUM SERPL-SCNC: 132 MMOL/L (ref 136–145)
SODIUM SERPL-SCNC: 133 MMOL/L (ref 136–145)
SODIUM SERPL-SCNC: 134 MMOL/L (ref 136–145)
SODIUM SERPL-SCNC: 135 MMOL/L (ref 136–145)
SODIUM SERPL-SCNC: 136 MMOL/L (ref 136–145)
SODIUM SERPL-SCNC: 137 MMOL/L (ref 136–145)
SODIUM SERPL-SCNC: 137 MMOL/L (ref 136–145)
SODIUM SERPL-SCNC: 138 MMOL/L (ref 136–145)
SODIUM UR-SCNC: <20 MMOL/L
SP GR UR STRIP: 1.01 (ref 1–1.03)
SP GR UR STRIP: 1.02 (ref 1–1.03)
SQUAMOUS #/AREA URNS HPF: ABNORMAL /HPF
SQUAMOUS #/AREA URNS HPF: ABNORMAL /HPF
STRESS TARGET HR: 113 BPM
T&S EXPIRATION DATE: NORMAL
T&S EXPIRATION DATE: NORMAL
TRANS CELLS #/AREA URNS HPF: ABNORMAL /HPF
TRIGL SERPL-MCNC: 61 MG/DL (ref 0–150)
TRIGL SERPL-MCNC: 61 MG/DL (ref 0–150)
TROPONIN T SERPL-MCNC: 0.01 NG/ML (ref 0–0.03)
TROPONIN T SERPL-MCNC: 0.02 NG/ML (ref 0–0.03)
TROPONIN T SERPL-MCNC: 0.03 NG/ML (ref 0–0.03)
TROPONIN T SERPL-MCNC: 0.04 NG/ML (ref 0–0.03)
TROPONIN T SERPL-MCNC: 0.05 NG/ML (ref 0–0.03)
TSH SERPL DL<=0.05 MIU/L-ACNC: 2.49 UIU/ML (ref 0.27–4.2)
TSH SERPL DL<=0.05 MIU/L-ACNC: 3.72 UIU/ML (ref 0.27–4.2)
UNIT  ABO: NORMAL
UNIT  RH: NORMAL
UROBILINOGEN UR QL STRIP: ABNORMAL
UROBILINOGEN UR QL STRIP: NORMAL
VENTILATOR MODE: ABNORMAL
VIT B12 BLD-MCNC: >2000 PG/ML (ref 211–946)
VLDLC SERPL-MCNC: 12.2 MG/DL
VLDLC SERPL-MCNC: 13 MG/DL (ref 5–40)
WBC # BLD AUTO: 10.28 10*3/MM3 (ref 3.4–10.8)
WBC # BLD AUTO: 10.5 10*3/MM3 (ref 3.4–10.8)
WBC # BLD AUTO: 10.57 10*3/MM3 (ref 3.4–10.8)
WBC # BLD AUTO: 10.61 10*3/MM3 (ref 3.4–10.8)
WBC # BLD AUTO: 11.23 10*3/MM3 (ref 3.4–10.8)
WBC # BLD AUTO: 12.3 10*3/MM3 (ref 3.4–10.8)
WBC # BLD AUTO: 13.18 10*3/MM3 (ref 3.4–10.8)
WBC # BLD AUTO: 13.78 10*3/MM3 (ref 3.4–10.8)
WBC # BLD AUTO: 14.89 10*3/MM3 (ref 3.4–10.8)
WBC # BLD AUTO: 15.09 10*3/MM3 (ref 3.4–10.8)
WBC # BLD AUTO: 16.83 10*3/MM3 (ref 3.4–10.8)
WBC # BLD AUTO: 16.87 10*3/MM3 (ref 3.4–10.8)
WBC # BLD AUTO: 6.09 10*3/MM3 (ref 3.4–10.8)
WBC # BLD AUTO: 7.72 10*3/MM3 (ref 3.4–10.8)
WBC # BLD AUTO: 8.14 10*3/MM3 (ref 3.4–10.8)
WBC # BLD AUTO: 9.28 10*3/MM3 (ref 3.4–10.8)
WBC # BLD AUTO: 9.33 10*3/MM3 (ref 3.4–10.8)
WBC # BLD AUTO: 9.5 10*3/MM3 (ref 3.4–10.8)
WBC # BLD AUTO: 9.52 10*3/MM3 (ref 3.4–10.8)
WBC UR QL AUTO: ABNORMAL /HPF
WBC UR QL AUTO: ABNORMAL /HPF
WHOLE BLOOD HOLD SPECIMEN: NORMAL

## 2020-01-01 PROCEDURE — G0378 HOSPITAL OBSERVATION PER HR: HCPCS

## 2020-01-01 PROCEDURE — 25010000002 FUROSEMIDE PER 20 MG: Performed by: INTERNAL MEDICINE

## 2020-01-01 PROCEDURE — 97535 SELF CARE MNGMENT TRAINING: CPT

## 2020-01-01 PROCEDURE — 87635 SARS-COV-2 COVID-19 AMP PRB: CPT | Performed by: NURSE PRACTITIONER

## 2020-01-01 PROCEDURE — 86901 BLOOD TYPING SEROLOGIC RH(D): CPT | Performed by: NEUROLOGICAL SURGERY

## 2020-01-01 PROCEDURE — 73502 X-RAY EXAM HIP UNI 2-3 VIEWS: CPT

## 2020-01-01 PROCEDURE — 25010000002 POTASSIUM CHLORIDE PER 2 MEQ: Performed by: NURSE PRACTITIONER

## 2020-01-01 PROCEDURE — 25010000002 CEFAZOLIN PER 500 MG: Performed by: NURSE PRACTITIONER

## 2020-01-01 PROCEDURE — 36415 COLL VENOUS BLD VENIPUNCTURE: CPT | Performed by: INTERNAL MEDICINE

## 2020-01-01 PROCEDURE — 80061 LIPID PANEL: CPT | Performed by: INTERNAL MEDICINE

## 2020-01-01 PROCEDURE — 97530 THERAPEUTIC ACTIVITIES: CPT

## 2020-01-01 PROCEDURE — 93017 CV STRESS TEST TRACING ONLY: CPT

## 2020-01-01 PROCEDURE — 80048 BASIC METABOLIC PNL TOTAL CA: CPT | Performed by: INTERNAL MEDICINE

## 2020-01-01 PROCEDURE — 93306 TTE W/DOPPLER COMPLETE: CPT

## 2020-01-01 PROCEDURE — 85027 COMPLETE CBC AUTOMATED: CPT | Performed by: NURSE PRACTITIONER

## 2020-01-01 PROCEDURE — 97110 THERAPEUTIC EXERCISES: CPT

## 2020-01-01 PROCEDURE — 71260 CT THORAX DX C+: CPT

## 2020-01-01 PROCEDURE — 85014 HEMATOCRIT: CPT | Performed by: FAMILY MEDICINE

## 2020-01-01 PROCEDURE — 85610 PROTHROMBIN TIME: CPT | Performed by: EMERGENCY MEDICINE

## 2020-01-01 PROCEDURE — 80053 COMPREHEN METABOLIC PANEL: CPT | Performed by: SPECIALIST

## 2020-01-01 PROCEDURE — 25010000002 CEFTRIAXONE PER 250 MG: Performed by: NURSE PRACTITIONER

## 2020-01-01 PROCEDURE — 99213 OFFICE O/P EST LOW 20 MIN: CPT | Performed by: NURSE PRACTITIONER

## 2020-01-01 PROCEDURE — 83880 ASSAY OF NATRIURETIC PEPTIDE: CPT | Performed by: INTERNAL MEDICINE

## 2020-01-01 PROCEDURE — 80053 COMPREHEN METABOLIC PANEL: CPT | Performed by: INTERNAL MEDICINE

## 2020-01-01 PROCEDURE — 92610 EVALUATE SWALLOWING FUNCTION: CPT | Performed by: SPEECH-LANGUAGE PATHOLOGIST

## 2020-01-01 PROCEDURE — C9803 HOPD COVID-19 SPEC COLLECT: HCPCS

## 2020-01-01 PROCEDURE — 93005 ELECTROCARDIOGRAM TRACING: CPT | Performed by: INTERNAL MEDICINE

## 2020-01-01 PROCEDURE — 94799 UNLISTED PULMONARY SVC/PX: CPT

## 2020-01-01 PROCEDURE — 99283 EMERGENCY DEPT VISIT LOW MDM: CPT

## 2020-01-01 PROCEDURE — 97162 PT EVAL MOD COMPLEX 30 MIN: CPT

## 2020-01-01 PROCEDURE — 22630 ARTHRD PST TQ 1NTRSPC LUM: CPT | Performed by: NEUROLOGICAL SURGERY

## 2020-01-01 PROCEDURE — 76380 CAT SCAN FOLLOW-UP STUDY: CPT

## 2020-01-01 PROCEDURE — 72131 CT LUMBAR SPINE W/O DYE: CPT

## 2020-01-01 PROCEDURE — 80048 BASIC METABOLIC PNL TOTAL CA: CPT | Performed by: NURSE PRACTITIONER

## 2020-01-01 PROCEDURE — 99024 POSTOP FOLLOW-UP VISIT: CPT | Performed by: NURSE PRACTITIONER

## 2020-01-01 PROCEDURE — 80053 COMPREHEN METABOLIC PANEL: CPT | Performed by: EMERGENCY MEDICINE

## 2020-01-01 PROCEDURE — 25010000003 LIDOCAINE 1 % SOLUTION: Performed by: INTERNAL MEDICINE

## 2020-01-01 PROCEDURE — 93010 ELECTROCARDIOGRAM REPORT: CPT | Performed by: INTERNAL MEDICINE

## 2020-01-01 PROCEDURE — 97166 OT EVAL MOD COMPLEX 45 MIN: CPT | Performed by: OCCUPATIONAL THERAPIST

## 2020-01-01 PROCEDURE — 87635 SARS-COV-2 COVID-19 AMP PRB: CPT | Performed by: FAMILY MEDICINE

## 2020-01-01 PROCEDURE — 85025 COMPLETE CBC W/AUTO DIFF WBC: CPT | Performed by: NURSE PRACTITIONER

## 2020-01-01 PROCEDURE — 87635 SARS-COV-2 COVID-19 AMP PRB: CPT | Performed by: EMERGENCY MEDICINE

## 2020-01-01 PROCEDURE — 22842 INSERT SPINE FIXATION DEVICE: CPT | Performed by: NEUROLOGICAL SURGERY

## 2020-01-01 PROCEDURE — 36600 WITHDRAWAL OF ARTERIAL BLOOD: CPT

## 2020-01-01 PROCEDURE — 78452 HT MUSCLE IMAGE SPECT MULT: CPT

## 2020-01-01 PROCEDURE — 85025 COMPLETE CBC W/AUTO DIFF WBC: CPT | Performed by: EMERGENCY MEDICINE

## 2020-01-01 PROCEDURE — 87086 URINE CULTURE/COLONY COUNT: CPT | Performed by: SPECIALIST

## 2020-01-01 PROCEDURE — 84484 ASSAY OF TROPONIN QUANT: CPT | Performed by: NURSE PRACTITIONER

## 2020-01-01 PROCEDURE — 99232 SBSQ HOSP IP/OBS MODERATE 35: CPT | Performed by: INTERNAL MEDICINE

## 2020-01-01 PROCEDURE — 85730 THROMBOPLASTIN TIME PARTIAL: CPT | Performed by: EMERGENCY MEDICINE

## 2020-01-01 PROCEDURE — 36415 COLL VENOUS BLD VENIPUNCTURE: CPT

## 2020-01-01 PROCEDURE — 85049 AUTOMATED PLATELET COUNT: CPT | Performed by: INTERNAL MEDICINE

## 2020-01-01 PROCEDURE — 25010000002 NALOXONE PER 1 MG: Performed by: NURSE PRACTITIONER

## 2020-01-01 PROCEDURE — 22632 ARTHRD PST TQ 1NTRSPC LM EA: CPT | Performed by: NEUROLOGICAL SURGERY

## 2020-01-01 PROCEDURE — 83036 HEMOGLOBIN GLYCOSYLATED A1C: CPT | Performed by: INTERNAL MEDICINE

## 2020-01-01 PROCEDURE — 0SG10AJ FUSION OF 2 OR MORE LUMBAR VERTEBRAL JOINTS WITH INTERBODY FUSION DEVICE, POSTERIOR APPROACH, ANTERIOR COLUMN, OPEN APPROACH: ICD-10-PCS | Performed by: NEUROLOGICAL SURGERY

## 2020-01-01 PROCEDURE — 93005 ELECTROCARDIOGRAM TRACING: CPT | Performed by: NURSE PRACTITIONER

## 2020-01-01 PROCEDURE — 80076 HEPATIC FUNCTION PANEL: CPT | Performed by: INTERNAL MEDICINE

## 2020-01-01 PROCEDURE — 87086 URINE CULTURE/COLONY COUNT: CPT | Performed by: NURSE PRACTITIONER

## 2020-01-01 PROCEDURE — 85730 THROMBOPLASTIN TIME PARTIAL: CPT | Performed by: NURSE PRACTITIONER

## 2020-01-01 PROCEDURE — 86927 PLASMA FRESH FROZEN: CPT

## 2020-01-01 PROCEDURE — 93000 ELECTROCARDIOGRAM COMPLETE: CPT | Performed by: NURSE PRACTITIONER

## 2020-01-01 PROCEDURE — 36415 COLL VENOUS BLD VENIPUNCTURE: CPT | Performed by: SPECIALIST

## 2020-01-01 PROCEDURE — 62284 INJECTION FOR MYELOGRAM: CPT

## 2020-01-01 PROCEDURE — 83036 HEMOGLOBIN GLYCOSYLATED A1C: CPT | Performed by: NURSE PRACTITIONER

## 2020-01-01 PROCEDURE — 70450 CT HEAD/BRAIN W/O DYE: CPT

## 2020-01-01 PROCEDURE — 99284 EMERGENCY DEPT VISIT MOD MDM: CPT

## 2020-01-01 PROCEDURE — 85610 PROTHROMBIN TIME: CPT | Performed by: INTERNAL MEDICINE

## 2020-01-01 PROCEDURE — 99285 EMERGENCY DEPT VISIT HI MDM: CPT

## 2020-01-01 PROCEDURE — 97168 OT RE-EVAL EST PLAN CARE: CPT | Performed by: OCCUPATIONAL THERAPIST

## 2020-01-01 PROCEDURE — 36430 TRANSFUSION BLD/BLD COMPNT: CPT

## 2020-01-01 PROCEDURE — 76705 ECHO EXAM OF ABDOMEN: CPT

## 2020-01-01 PROCEDURE — 81003 URINALYSIS AUTO W/O SCOPE: CPT | Performed by: NURSE PRACTITIONER

## 2020-01-01 PROCEDURE — 25010000002 FENTANYL CITRATE (PF) 100 MCG/2ML SOLUTION: Performed by: ANESTHESIOLOGY

## 2020-01-01 PROCEDURE — 83735 ASSAY OF MAGNESIUM: CPT | Performed by: INTERNAL MEDICINE

## 2020-01-01 PROCEDURE — 84484 ASSAY OF TROPONIN QUANT: CPT | Performed by: EMERGENCY MEDICINE

## 2020-01-01 PROCEDURE — 25010000002 TDAP 5-2.5-18.5 LF-MCG/0.5 SUSPENSION: Performed by: NURSE PRACTITIONER

## 2020-01-01 PROCEDURE — 85027 COMPLETE CBC AUTOMATED: CPT | Performed by: INTERNAL MEDICINE

## 2020-01-01 PROCEDURE — 0ST20ZZ RESECTION OF LUMBAR VERTEBRAL DISC, OPEN APPROACH: ICD-10-PCS | Performed by: NEUROLOGICAL SURGERY

## 2020-01-01 PROCEDURE — 71275 CT ANGIOGRAPHY CHEST: CPT

## 2020-01-01 PROCEDURE — 80053 COMPREHEN METABOLIC PANEL: CPT | Performed by: NURSE PRACTITIONER

## 2020-01-01 PROCEDURE — P9017 PLASMA 1 DONOR FRZ W/IN 8 HR: HCPCS

## 2020-01-01 PROCEDURE — 72192 CT PELVIS W/O DYE: CPT

## 2020-01-01 PROCEDURE — 99214 OFFICE O/P EST MOD 30 MIN: CPT | Performed by: NURSE PRACTITIONER

## 2020-01-01 PROCEDURE — 25010000003 CEFAZOLIN PER 500 MG: Performed by: NEUROLOGICAL SURGERY

## 2020-01-01 PROCEDURE — 99212 OFFICE O/P EST SF 10 MIN: CPT | Performed by: NURSE PRACTITIONER

## 2020-01-01 PROCEDURE — 25010000002 DEXAMETHASONE SODIUM PHOSPHATE 10 MG/ML SOLUTION: Performed by: NURSE PRACTITIONER

## 2020-01-01 PROCEDURE — 78452 HT MUSCLE IMAGE SPECT MULT: CPT | Performed by: INTERNAL MEDICINE

## 2020-01-01 PROCEDURE — 25010000002 FUROSEMIDE PER 20 MG: Performed by: NURSE PRACTITIONER

## 2020-01-01 PROCEDURE — 86850 RBC ANTIBODY SCREEN: CPT | Performed by: NEUROLOGICAL SURGERY

## 2020-01-01 PROCEDURE — G0463 HOSPITAL OUTPT CLINIC VISIT: HCPCS | Performed by: NEUROLOGICAL SURGERY

## 2020-01-01 PROCEDURE — 80061 LIPID PANEL: CPT | Performed by: NURSE PRACTITIONER

## 2020-01-01 PROCEDURE — 87040 BLOOD CULTURE FOR BACTERIA: CPT | Performed by: NURSE PRACTITIONER

## 2020-01-01 PROCEDURE — 72110 X-RAY EXAM L-2 SPINE 4/>VWS: CPT

## 2020-01-01 PROCEDURE — 72125 CT NECK SPINE W/O DYE: CPT

## 2020-01-01 PROCEDURE — A9500 TC99M SESTAMIBI: HCPCS | Performed by: FAMILY MEDICINE

## 2020-01-01 PROCEDURE — 97597 DBRDMT OPN WND 1ST 20 CM/<: CPT | Performed by: NURSE PRACTITIONER

## 2020-01-01 PROCEDURE — C1713 ANCHOR/SCREW BN/BN,TIS/BN: HCPCS | Performed by: NEUROLOGICAL SURGERY

## 2020-01-01 PROCEDURE — 83735 ASSAY OF MAGNESIUM: CPT | Performed by: NURSE PRACTITIONER

## 2020-01-01 PROCEDURE — 97164 PT RE-EVAL EST PLAN CARE: CPT

## 2020-01-01 PROCEDURE — 25010000002 ONDANSETRON PER 1 MG: Performed by: ANESTHESIOLOGY

## 2020-01-01 PROCEDURE — 01NB0ZZ RELEASE LUMBAR NERVE, OPEN APPROACH: ICD-10-PCS | Performed by: NEUROLOGICAL SURGERY

## 2020-01-01 PROCEDURE — 25010000002 DEXAMETHASONE PER 1 MG: Performed by: NURSE ANESTHETIST, CERTIFIED REGISTERED

## 2020-01-01 PROCEDURE — 80048 BASIC METABOLIC PNL TOTAL CA: CPT | Performed by: FAMILY MEDICINE

## 2020-01-01 PROCEDURE — 99214 OFFICE O/P EST MOD 30 MIN: CPT | Performed by: NEUROLOGICAL SURGERY

## 2020-01-01 PROCEDURE — 85025 COMPLETE CBC W/AUTO DIFF WBC: CPT | Performed by: INTERNAL MEDICINE

## 2020-01-01 PROCEDURE — 90715 TDAP VACCINE 7 YRS/> IM: CPT | Performed by: NURSE PRACTITIONER

## 2020-01-01 PROCEDURE — 71045 X-RAY EXAM CHEST 1 VIEW: CPT

## 2020-01-01 PROCEDURE — 81001 URINALYSIS AUTO W/SCOPE: CPT | Performed by: SPECIALIST

## 2020-01-01 PROCEDURE — 99214 OFFICE O/P EST MOD 30 MIN: CPT | Performed by: INTERNAL MEDICINE

## 2020-01-01 PROCEDURE — 36415 COLL VENOUS BLD VENIPUNCTURE: CPT | Performed by: NURSE PRACTITIONER

## 2020-01-01 PROCEDURE — 11042 DBRDMT SUBQ TIS 1ST 20SQCM/<: CPT | Performed by: NURSE PRACTITIONER

## 2020-01-01 PROCEDURE — 99213 OFFICE O/P EST LOW 20 MIN: CPT | Performed by: NEUROLOGICAL SURGERY

## 2020-01-01 PROCEDURE — 84484 ASSAY OF TROPONIN QUANT: CPT | Performed by: INTERNAL MEDICINE

## 2020-01-01 PROCEDURE — 82607 VITAMIN B-12: CPT | Performed by: INTERNAL MEDICINE

## 2020-01-01 PROCEDURE — 86900 BLOOD TYPING SEROLOGIC ABO: CPT | Performed by: NEUROLOGICAL SURGERY

## 2020-01-01 PROCEDURE — 85610 PROTHROMBIN TIME: CPT | Performed by: NEUROLOGICAL SURGERY

## 2020-01-01 PROCEDURE — 25010000002 PROPOFOL 10 MG/ML EMULSION: Performed by: NURSE ANESTHETIST, CERTIFIED REGISTERED

## 2020-01-01 PROCEDURE — 25010000002 REGADENOSON 0.4 MG/5ML SOLUTION: Performed by: FAMILY MEDICINE

## 2020-01-01 PROCEDURE — 25010000002 AZITHROMYCIN PER 500 MG: Performed by: NURSE PRACTITIONER

## 2020-01-01 PROCEDURE — 25010000002 SUCCINYLCHOLINE PER 20 MG: Performed by: NURSE ANESTHETIST, CERTIFIED REGISTERED

## 2020-01-01 PROCEDURE — 25010000002 DEXAMETHASONE PER 1 MG: Performed by: NEUROLOGICAL SURGERY

## 2020-01-01 PROCEDURE — 72240 MYELOGRAPHY NECK SPINE: CPT

## 2020-01-01 PROCEDURE — 88304 TISSUE EXAM BY PATHOLOGIST: CPT | Performed by: NEUROLOGICAL SURGERY

## 2020-01-01 PROCEDURE — S0260 H&P FOR SURGERY: HCPCS | Performed by: NEUROLOGICAL SURGERY

## 2020-01-01 PROCEDURE — 87186 SC STD MICRODIL/AGAR DIL: CPT | Performed by: NURSE PRACTITIONER

## 2020-01-01 PROCEDURE — 93005 ELECTROCARDIOGRAM TRACING: CPT | Performed by: FAMILY MEDICINE

## 2020-01-01 PROCEDURE — 99443 PR PHYS/QHP TELEPHONE EVALUATION 21-30 MIN: CPT | Performed by: NURSE PRACTITIONER

## 2020-01-01 PROCEDURE — 90471 IMMUNIZATION ADMIN: CPT | Performed by: NURSE PRACTITIONER

## 2020-01-01 PROCEDURE — 72132 CT LUMBAR SPINE W/DYE: CPT

## 2020-01-01 PROCEDURE — 73700 CT LOWER EXTREMITY W/O DYE: CPT

## 2020-01-01 PROCEDURE — 87088 URINE BACTERIA CULTURE: CPT | Performed by: NURSE PRACTITIONER

## 2020-01-01 PROCEDURE — 85379 FIBRIN DEGRADATION QUANT: CPT | Performed by: EMERGENCY MEDICINE

## 2020-01-01 PROCEDURE — 81001 URINALYSIS AUTO W/SCOPE: CPT | Performed by: NURSE PRACTITIONER

## 2020-01-01 PROCEDURE — 81003 URINALYSIS AUTO W/O SCOPE: CPT | Performed by: INTERNAL MEDICINE

## 2020-01-01 PROCEDURE — 84443 ASSAY THYROID STIM HORMONE: CPT | Performed by: EMERGENCY MEDICINE

## 2020-01-01 PROCEDURE — 82962 GLUCOSE BLOOD TEST: CPT

## 2020-01-01 PROCEDURE — 63710000001 DEXAMETHASONE PER 0.25 MG: Performed by: NURSE PRACTITIONER

## 2020-01-01 PROCEDURE — 97161 PT EVAL LOW COMPLEX 20 MIN: CPT | Performed by: PHYSICAL THERAPIST

## 2020-01-01 PROCEDURE — 83880 ASSAY OF NATRIURETIC PEPTIDE: CPT | Performed by: NURSE PRACTITIONER

## 2020-01-01 PROCEDURE — A9500 TC99M SESTAMIBI: HCPCS | Performed by: INTERNAL MEDICINE

## 2020-01-01 PROCEDURE — 25010000002 ONDANSETRON PER 1 MG: Performed by: INTERNAL MEDICINE

## 2020-01-01 PROCEDURE — 85610 PROTHROMBIN TIME: CPT | Performed by: NURSE PRACTITIONER

## 2020-01-01 PROCEDURE — 25010000002 MORPHINE SULFATE (PF) 2 MG/ML SOLUTION: Performed by: ANESTHESIOLOGY

## 2020-01-01 PROCEDURE — 92526 ORAL FUNCTION THERAPY: CPT | Performed by: SPEECH-LANGUAGE PATHOLOGIST

## 2020-01-01 PROCEDURE — 93306 TTE W/DOPPLER COMPLETE: CPT | Performed by: INTERNAL MEDICINE

## 2020-01-01 PROCEDURE — 83935 ASSAY OF URINE OSMOLALITY: CPT | Performed by: INTERNAL MEDICINE

## 2020-01-01 PROCEDURE — 93356 MYOCRD STRAIN IMG SPCKL TRCK: CPT | Performed by: INTERNAL MEDICINE

## 2020-01-01 PROCEDURE — 99231 SBSQ HOSP IP/OBS SF/LOW 25: CPT | Performed by: UROLOGY

## 2020-01-01 PROCEDURE — 80074 ACUTE HEPATITIS PANEL: CPT | Performed by: INTERNAL MEDICINE

## 2020-01-01 PROCEDURE — 85025 COMPLETE CBC W/AUTO DIFF WBC: CPT | Performed by: SPECIALIST

## 2020-01-01 PROCEDURE — 82550 ASSAY OF CK (CPK): CPT | Performed by: INTERNAL MEDICINE

## 2020-01-01 PROCEDURE — 84443 ASSAY THYROID STIM HORMONE: CPT | Performed by: INTERNAL MEDICINE

## 2020-01-01 PROCEDURE — 83735 ASSAY OF MAGNESIUM: CPT | Performed by: EMERGENCY MEDICINE

## 2020-01-01 PROCEDURE — 25010000002 ZIPRASIDONE MESYLATE PER 10 MG: Performed by: INTERNAL MEDICINE

## 2020-01-01 PROCEDURE — 82803 BLOOD GASES ANY COMBINATION: CPT

## 2020-01-01 PROCEDURE — 25010000002 PERFLUTREN 6.52 MG/ML SUSPENSION: Performed by: FAMILY MEDICINE

## 2020-01-01 PROCEDURE — 93000 ELECTROCARDIOGRAM COMPLETE: CPT | Performed by: INTERNAL MEDICINE

## 2020-01-01 PROCEDURE — 93356 MYOCRD STRAIN IMG SPCKL TRCK: CPT

## 2020-01-01 PROCEDURE — 83880 ASSAY OF NATRIURETIC PEPTIDE: CPT | Performed by: EMERGENCY MEDICINE

## 2020-01-01 PROCEDURE — 85018 HEMOGLOBIN: CPT | Performed by: FAMILY MEDICINE

## 2020-01-01 PROCEDURE — 83605 ASSAY OF LACTIC ACID: CPT | Performed by: NURSE PRACTITIONER

## 2020-01-01 PROCEDURE — 93288 INTERROG EVL PM/LDLS PM IP: CPT | Performed by: INTERNAL MEDICINE

## 2020-01-01 PROCEDURE — 93018 CV STRESS TEST I&R ONLY: CPT | Performed by: INTERNAL MEDICINE

## 2020-01-01 PROCEDURE — 88311 DECALCIFY TISSUE: CPT | Performed by: NEUROLOGICAL SURGERY

## 2020-01-01 PROCEDURE — 84300 ASSAY OF URINE SODIUM: CPT | Performed by: INTERNAL MEDICINE

## 2020-01-01 PROCEDURE — 99222 1ST HOSP IP/OBS MODERATE 55: CPT | Performed by: NURSE PRACTITIONER

## 2020-01-01 PROCEDURE — 72128 CT CHEST SPINE W/O DYE: CPT

## 2020-01-01 PROCEDURE — 22853 INSJ BIOMECHANICAL DEVICE: CPT | Performed by: NEUROLOGICAL SURGERY

## 2020-01-01 PROCEDURE — 97535 SELF CARE MNGMENT TRAINING: CPT | Performed by: OCCUPATIONAL THERAPIST

## 2020-01-01 PROCEDURE — 25010000002 ONDANSETRON PER 1 MG: Performed by: NURSE ANESTHETIST, CERTIFIED REGISTERED

## 2020-01-01 PROCEDURE — 0 TECHNETIUM SESTAMIBI: Performed by: INTERNAL MEDICINE

## 2020-01-01 PROCEDURE — 84100 ASSAY OF PHOSPHORUS: CPT | Performed by: INTERNAL MEDICINE

## 2020-01-01 PROCEDURE — 0 IOPAMIDOL 41 % SOLUTION: Performed by: INTERNAL MEDICINE

## 2020-01-01 PROCEDURE — 87635 SARS-COV-2 COVID-19 AMP PRB: CPT | Performed by: NEUROLOGICAL SURGERY

## 2020-01-01 PROCEDURE — 85730 THROMBOPLASTIN TIME PARTIAL: CPT | Performed by: INTERNAL MEDICINE

## 2020-01-01 PROCEDURE — 25010000002 NALOXONE PER 1 MG: Performed by: INTERNAL MEDICINE

## 2020-01-01 PROCEDURE — 97166 OT EVAL MOD COMPLEX 45 MIN: CPT

## 2020-01-01 PROCEDURE — 99221 1ST HOSP IP/OBS SF/LOW 40: CPT | Performed by: UROLOGY

## 2020-01-01 PROCEDURE — 25010000002 IOPAMIDOL 61 % SOLUTION: Performed by: NURSE PRACTITIONER

## 2020-01-01 PROCEDURE — G0463 HOSPITAL OUTPT CLINIC VISIT: HCPCS

## 2020-01-01 PROCEDURE — 84134 ASSAY OF PREALBUMIN: CPT | Performed by: INTERNAL MEDICINE

## 2020-01-01 PROCEDURE — 0 TECHNETIUM SESTAMIBI: Performed by: FAMILY MEDICINE

## 2020-01-01 PROCEDURE — 0 IOPAMIDOL PER 1 ML: Performed by: FAMILY MEDICINE

## 2020-01-01 PROCEDURE — 83690 ASSAY OF LIPASE: CPT | Performed by: NURSE PRACTITIONER

## 2020-01-01 PROCEDURE — 62304 MYELOGRAPHY LUMBAR INJECTION: CPT

## 2020-01-01 PROCEDURE — 74177 CT ABD & PELVIS W/CONTRAST: CPT

## 2020-01-01 DEVICE — MAS 54850016545 4.75 EXT TAB CANN 6.5X45
Type: IMPLANTABLE DEVICE | Status: FUNCTIONAL
Brand: CD HORIZON® SOLERA® SPINAL SYSTEM

## 2020-01-01 DEVICE — SCRW ST SOLERA VOYAGER BRKOFF TI 4.75MM: Type: IMPLANTABLE DEVICE | Status: FUNCTIONAL

## 2020-01-01 DEVICE — ROD 1476006110 4.75 CCM+ STRT PERC 110MM
Type: IMPLANTABLE DEVICE | Status: FUNCTIONAL
Brand: CD HORIZON® SPINAL SYSTEM

## 2020-01-01 DEVICE — DBM T43103 2.5CC GRAFTON PUTTY
Type: IMPLANTABLE DEVICE | Status: FUNCTIONAL
Brand: GRAFTON®AND GRAFTON PLUS®DEMINERALIZED BONE MATRIX (DBM)

## 2020-01-01 DEVICE — SPACER 7770828 ELEVATE STD 28X8MM
Type: IMPLANTABLE DEVICE | Status: FUNCTIONAL
Brand: ELEVATE™ SPINAL SYSTEM

## 2020-01-01 DEVICE — ROD 1476006100 4.75 CCM+ STRT PERC 100MM
Type: IMPLANTABLE DEVICE | Status: FUNCTIONAL
Brand: CD HORIZON® SPINAL SYSTEM

## 2020-01-01 DEVICE — MAS 54850017545 4.75 EXT TAB CANN 7.5X45
Type: IMPLANTABLE DEVICE | Status: FUNCTIONAL
Brand: CD HORIZON® SOLERA® SPINAL SYSTEM

## 2020-01-01 DEVICE — SPACER 7770723 ELEVATE X-LOR 23X7MM
Type: IMPLANTABLE DEVICE | Status: FUNCTIONAL
Brand: ELEVATE™ SPINAL SYSTEM

## 2020-01-01 DEVICE — KT HEMOST ABS SURGIFOAM PORCN 1GRAM: Type: IMPLANTABLE DEVICE | Status: FUNCTIONAL

## 2020-01-01 RX ORDER — ONDANSETRON 2 MG/ML
4 INJECTION INTRAMUSCULAR; INTRAVENOUS EVERY 6 HOURS PRN
Status: DISCONTINUED | OUTPATIENT
Start: 2020-01-01 | End: 2020-01-01 | Stop reason: HOSPADM

## 2020-01-01 RX ORDER — METOPROLOL TARTRATE 50 MG/1
75 TABLET, FILM COATED ORAL 2 TIMES DAILY
COMMUNITY
End: 2020-01-01

## 2020-01-01 RX ORDER — FUROSEMIDE 20 MG/1
20 TABLET ORAL DAILY
COMMUNITY

## 2020-01-01 RX ORDER — LIDOCAINE HYDROCHLORIDE 10 MG/ML
0.5 INJECTION, SOLUTION EPIDURAL; INFILTRATION; INTRACAUDAL; PERINEURAL ONCE AS NEEDED
Status: DISCONTINUED | OUTPATIENT
Start: 2020-01-01 | End: 2020-01-01 | Stop reason: HOSPADM

## 2020-01-01 RX ORDER — RIVAROXABAN 20 MG/1
TABLET, FILM COATED ORAL
Qty: 90 TABLET | Refills: 3 | Status: ON HOLD | OUTPATIENT
Start: 2020-01-01 | End: 2020-01-01

## 2020-01-01 RX ORDER — LISINOPRIL 5 MG/1
5 TABLET ORAL DAILY
Status: DISCONTINUED | OUTPATIENT
Start: 2020-01-01 | End: 2020-01-01 | Stop reason: HOSPADM

## 2020-01-01 RX ORDER — SODIUM CHLORIDE 0.9 % (FLUSH) 0.9 %
3 SYRINGE (ML) INJECTION AS NEEDED
Status: DISCONTINUED | OUTPATIENT
Start: 2020-01-01 | End: 2020-01-01 | Stop reason: HOSPADM

## 2020-01-01 RX ORDER — ONDANSETRON 2 MG/ML
4 INJECTION INTRAMUSCULAR; INTRAVENOUS AS NEEDED
Status: DISCONTINUED | OUTPATIENT
Start: 2020-01-01 | End: 2020-01-01 | Stop reason: HOSPADM

## 2020-01-01 RX ORDER — BUPIVACAINE HCL/0.9 % NACL/PF 0.1 %
2 PLASTIC BAG, INJECTION (ML) EPIDURAL ONCE
Status: DISCONTINUED | OUTPATIENT
Start: 2020-01-01 | End: 2020-01-01 | Stop reason: HOSPADM

## 2020-01-01 RX ORDER — FUROSEMIDE 20 MG/1
20 TABLET ORAL DAILY
Status: DISCONTINUED | OUTPATIENT
Start: 2020-01-01 | End: 2020-01-01 | Stop reason: HOSPADM

## 2020-01-01 RX ORDER — PRAVASTATIN SODIUM 20 MG
20 TABLET ORAL NIGHTLY
Status: DISCONTINUED | OUTPATIENT
Start: 2020-01-01 | End: 2020-01-01 | Stop reason: HOSPADM

## 2020-01-01 RX ORDER — DEXAMETHASONE SODIUM PHOSPHATE 10 MG/ML
6 INJECTION, SOLUTION INTRAMUSCULAR; INTRAVENOUS EVERY 6 HOURS SCHEDULED
Status: DISCONTINUED | OUTPATIENT
Start: 2020-01-01 | End: 2020-01-01

## 2020-01-01 RX ORDER — SODIUM CHLORIDE 9 MG/ML
60 INJECTION, SOLUTION INTRAVENOUS CONTINUOUS
Status: DISCONTINUED | OUTPATIENT
Start: 2020-01-01 | End: 2020-01-01

## 2020-01-01 RX ORDER — METOPROLOL TARTRATE 100 MG/1
100 TABLET ORAL DAILY
Status: ON HOLD | COMMUNITY
End: 2020-01-01

## 2020-01-01 RX ORDER — LISINOPRIL 5 MG/1
5 TABLET ORAL DAILY
Start: 2020-01-01

## 2020-01-01 RX ORDER — METOPROLOL TARTRATE 50 MG/1
100 TABLET, FILM COATED ORAL DAILY
Status: DISCONTINUED | OUTPATIENT
Start: 2020-01-01 | End: 2020-01-01

## 2020-01-01 RX ORDER — SUCCINYLCHOLINE CHLORIDE 20 MG/ML
INJECTION INTRAMUSCULAR; INTRAVENOUS AS NEEDED
Status: DISCONTINUED | OUTPATIENT
Start: 2020-01-01 | End: 2020-01-01 | Stop reason: SURG

## 2020-01-01 RX ORDER — POTASSIUM CHLORIDE 750 MG/1
20 TABLET, FILM COATED, EXTENDED RELEASE ORAL 2 TIMES DAILY
Start: 2020-01-01 | End: 2020-12-31

## 2020-01-01 RX ORDER — FUROSEMIDE 10 MG/ML
40 INJECTION INTRAMUSCULAR; INTRAVENOUS DAILY
Status: DISCONTINUED | OUTPATIENT
Start: 2020-01-01 | End: 2020-01-01

## 2020-01-01 RX ORDER — LISINOPRIL 10 MG/1
10 TABLET ORAL DAILY
Status: DISCONTINUED | OUTPATIENT
Start: 2020-01-01 | End: 2020-01-01 | Stop reason: HOSPADM

## 2020-01-01 RX ORDER — CARVEDILOL 12.5 MG/1
12.5 TABLET ORAL 2 TIMES DAILY WITH MEALS
Start: 2020-01-01 | End: 2020-01-01

## 2020-01-01 RX ORDER — LISINOPRIL 10 MG/1
10 TABLET ORAL DAILY
COMMUNITY
End: 2020-01-01 | Stop reason: HOSPADM

## 2020-01-01 RX ORDER — QUETIAPINE FUMARATE 25 MG/1
25 TABLET, FILM COATED ORAL NIGHTLY
Status: DISCONTINUED | OUTPATIENT
Start: 2020-01-01 | End: 2020-01-01

## 2020-01-01 RX ORDER — PAROXETINE HYDROCHLORIDE 20 MG/1
20 TABLET, FILM COATED ORAL EVERY MORNING
COMMUNITY

## 2020-01-01 RX ORDER — SODIUM CHLORIDE 0.9 % (FLUSH) 0.9 %
10 SYRINGE (ML) INJECTION AS NEEDED
Status: DISCONTINUED | OUTPATIENT
Start: 2020-01-01 | End: 2020-01-01 | Stop reason: HOSPADM

## 2020-01-01 RX ORDER — BUPIVACAINE HCL/0.9 % NACL/PF 0.1 %
2 PLASTIC BAG, INJECTION (ML) EPIDURAL EVERY 8 HOURS
Status: COMPLETED | OUTPATIENT
Start: 2020-01-01 | End: 2020-01-01

## 2020-01-01 RX ORDER — SODIUM CHLORIDE 9 MG/ML
125 INJECTION, SOLUTION INTRAVENOUS CONTINUOUS
Status: DISCONTINUED | OUTPATIENT
Start: 2020-01-01 | End: 2020-01-01

## 2020-01-01 RX ORDER — BUPIVACAINE HCL/0.9 % NACL/PF 0.1 %
2 PLASTIC BAG, INJECTION (ML) EPIDURAL ONCE
Status: COMPLETED | OUTPATIENT
Start: 2020-01-01 | End: 2020-01-01

## 2020-01-01 RX ORDER — ACETAMINOPHEN 325 MG/1
650 TABLET ORAL EVERY 6 HOURS PRN
Status: DISCONTINUED | OUTPATIENT
Start: 2020-01-01 | End: 2020-01-01 | Stop reason: SDUPTHER

## 2020-01-01 RX ORDER — HYDROCODONE BITARTRATE AND ACETAMINOPHEN 5; 325 MG/1; MG/1
1 TABLET ORAL EVERY 6 HOURS PRN
COMMUNITY
End: 2020-01-01 | Stop reason: HOSPADM

## 2020-01-01 RX ORDER — FLUTICASONE PROPIONATE 50 MCG
2 SPRAY, SUSPENSION (ML) NASAL DAILY
Qty: 18.2 ML | Refills: 0 | Status: SHIPPED | OUTPATIENT
Start: 2020-01-01 | End: 2020-01-01

## 2020-01-01 RX ORDER — POTASSIUM CHLORIDE 7.45 MG/ML
10 INJECTION INTRAVENOUS
Status: DISCONTINUED | OUTPATIENT
Start: 2020-01-01 | End: 2020-01-01 | Stop reason: HOSPADM

## 2020-01-01 RX ORDER — HYDROCODONE BITARTRATE AND ACETAMINOPHEN 7.5; 325 MG/1; MG/1
1 TABLET ORAL EVERY 6 HOURS PRN
Status: DISPENSED | OUTPATIENT
Start: 2020-01-01 | End: 2020-01-01

## 2020-01-01 RX ORDER — ONDANSETRON 2 MG/ML
4 INJECTION INTRAMUSCULAR; INTRAVENOUS AS NEEDED
Status: CANCELLED | OUTPATIENT
Start: 2020-01-01 | End: 2020-01-01

## 2020-01-01 RX ORDER — MORPHINE SULFATE 2 MG/ML
2 INJECTION, SOLUTION INTRAMUSCULAR; INTRAVENOUS
Status: DISCONTINUED | OUTPATIENT
Start: 2020-01-01 | End: 2020-01-01 | Stop reason: HOSPADM

## 2020-01-01 RX ORDER — FUROSEMIDE 10 MG/ML
40 INJECTION INTRAMUSCULAR; INTRAVENOUS ONCE
Status: DISCONTINUED | OUTPATIENT
Start: 2020-01-01 | End: 2020-01-01

## 2020-01-01 RX ORDER — FUROSEMIDE 10 MG/ML
20 INJECTION INTRAMUSCULAR; INTRAVENOUS ONCE
Status: COMPLETED | OUTPATIENT
Start: 2020-01-01 | End: 2020-01-01

## 2020-01-01 RX ORDER — POTASSIUM CHLORIDE 1.5 G/1.77G
40 POWDER, FOR SOLUTION ORAL ONCE
Status: COMPLETED | OUTPATIENT
Start: 2020-01-01 | End: 2020-01-01

## 2020-01-01 RX ORDER — OXYCODONE AND ACETAMINOPHEN 10; 325 MG/1; MG/1
1 TABLET ORAL ONCE AS NEEDED
Status: CANCELLED | OUTPATIENT
Start: 2020-01-01

## 2020-01-01 RX ORDER — MULTIPLE VITAMINS W/ MINERALS TAB 9MG-400MCG
1 TAB ORAL DAILY
Status: DISCONTINUED | OUTPATIENT
Start: 2020-01-01 | End: 2020-01-01 | Stop reason: HOSPADM

## 2020-01-01 RX ORDER — SODIUM CHLORIDE, SODIUM LACTATE, POTASSIUM CHLORIDE, CALCIUM CHLORIDE 600; 310; 30; 20 MG/100ML; MG/100ML; MG/100ML; MG/100ML
1000 INJECTION, SOLUTION INTRAVENOUS CONTINUOUS
Status: DISCONTINUED | OUTPATIENT
Start: 2020-01-01 | End: 2020-01-01

## 2020-01-01 RX ORDER — SODIUM CHLORIDE 0.9 % (FLUSH) 0.9 %
10 SYRINGE (ML) INJECTION EVERY 12 HOURS SCHEDULED
Status: DISCONTINUED | OUTPATIENT
Start: 2020-01-01 | End: 2020-01-01 | Stop reason: HOSPADM

## 2020-01-01 RX ORDER — LIDOCAINE HYDROCHLORIDE 40 MG/ML
SOLUTION TOPICAL AS NEEDED
Status: DISCONTINUED | OUTPATIENT
Start: 2020-01-01 | End: 2020-01-01 | Stop reason: SURG

## 2020-01-01 RX ORDER — SODIUM CHLORIDE 0.9 % (FLUSH) 0.9 %
3 SYRINGE (ML) INJECTION EVERY 12 HOURS SCHEDULED
Status: DISCONTINUED | OUTPATIENT
Start: 2020-01-01 | End: 2020-01-01 | Stop reason: HOSPADM

## 2020-01-01 RX ORDER — PRAVASTATIN SODIUM 20 MG
20 TABLET ORAL DAILY
Status: DISCONTINUED | OUTPATIENT
Start: 2020-01-01 | End: 2020-01-01 | Stop reason: HOSPADM

## 2020-01-01 RX ORDER — PRAVASTATIN SODIUM 20 MG
20 TABLET ORAL DAILY
COMMUNITY

## 2020-01-01 RX ORDER — LISINOPRIL 10 MG/1
1 TABLET ORAL DAILY
COMMUNITY
Start: 2020-01-01 | End: 2020-01-01

## 2020-01-01 RX ORDER — FUROSEMIDE 20 MG/1
1 TABLET ORAL DAILY
COMMUNITY
Start: 2020-01-01 | End: 2020-01-01

## 2020-01-01 RX ORDER — METOPROLOL SUCCINATE 100 MG/1
100 TABLET, EXTENDED RELEASE ORAL DAILY
Qty: 90 TABLET | Refills: 3 | Status: SHIPPED | OUTPATIENT
Start: 2020-01-01 | End: 2020-01-01 | Stop reason: ALTCHOICE

## 2020-01-01 RX ORDER — LIDOCAINE HYDROCHLORIDE 10 MG/ML
5 INJECTION, SOLUTION INFILTRATION; PERINEURAL ONCE
Status: COMPLETED | OUTPATIENT
Start: 2020-01-01 | End: 2020-01-01

## 2020-01-01 RX ORDER — SODIUM CHLORIDE, SODIUM LACTATE, POTASSIUM CHLORIDE, CALCIUM CHLORIDE 600; 310; 30; 20 MG/100ML; MG/100ML; MG/100ML; MG/100ML
100 INJECTION, SOLUTION INTRAVENOUS CONTINUOUS
Status: DISCONTINUED | OUTPATIENT
Start: 2020-01-01 | End: 2020-01-01

## 2020-01-01 RX ORDER — SODIUM CHLORIDE 0.9 % (FLUSH) 0.9 %
3-10 SYRINGE (ML) INJECTION AS NEEDED
Status: DISCONTINUED | OUTPATIENT
Start: 2020-01-01 | End: 2020-01-01 | Stop reason: HOSPADM

## 2020-01-01 RX ORDER — SODIUM CHLORIDE 9 MG/ML
75 INJECTION, SOLUTION INTRAVENOUS CONTINUOUS
Status: DISCONTINUED | OUTPATIENT
Start: 2020-01-01 | End: 2020-01-01

## 2020-01-01 RX ORDER — DEXAMETHASONE 4 MG/1
4 TABLET ORAL EVERY 12 HOURS SCHEDULED
Status: DISCONTINUED | OUTPATIENT
Start: 2020-01-01 | End: 2020-01-01

## 2020-01-01 RX ORDER — POTASSIUM CHLORIDE 750 MG/1
40 CAPSULE, EXTENDED RELEASE ORAL AS NEEDED
Status: DISCONTINUED | OUTPATIENT
Start: 2020-01-01 | End: 2020-01-01 | Stop reason: HOSPADM

## 2020-01-01 RX ORDER — NALOXONE HCL 0.4 MG/ML
0.04 VIAL (ML) INJECTION AS NEEDED
Status: DISCONTINUED | OUTPATIENT
Start: 2020-01-01 | End: 2020-01-01 | Stop reason: HOSPADM

## 2020-01-01 RX ORDER — ACETAMINOPHEN 325 MG/1
650 TABLET ORAL EVERY 4 HOURS PRN
Status: DISCONTINUED | OUTPATIENT
Start: 2020-01-01 | End: 2020-01-01 | Stop reason: HOSPADM

## 2020-01-01 RX ORDER — BUPIVACAINE HYDROCHLORIDE AND EPINEPHRINE 5; 5 MG/ML; UG/ML
INJECTION, SOLUTION PERINEURAL AS NEEDED
Status: DISCONTINUED | OUTPATIENT
Start: 2020-01-01 | End: 2020-01-01 | Stop reason: HOSPADM

## 2020-01-01 RX ORDER — DEXAMETHASONE SODIUM PHOSPHATE 4 MG/ML
INJECTION, SOLUTION INTRA-ARTICULAR; INTRALESIONAL; INTRAMUSCULAR; INTRAVENOUS; SOFT TISSUE AS NEEDED
Status: DISCONTINUED | OUTPATIENT
Start: 2020-01-01 | End: 2020-01-01 | Stop reason: SURG

## 2020-01-01 RX ORDER — FENTANYL CITRATE 50 UG/ML
25 INJECTION, SOLUTION INTRAMUSCULAR; INTRAVENOUS
Status: DISCONTINUED | OUTPATIENT
Start: 2020-01-01 | End: 2020-01-01 | Stop reason: HOSPADM

## 2020-01-01 RX ORDER — DEXAMETHASONE SODIUM PHOSPHATE 4 MG/ML
4 INJECTION, SOLUTION INTRA-ARTICULAR; INTRALESIONAL; INTRAMUSCULAR; INTRAVENOUS; SOFT TISSUE EVERY 8 HOURS
Status: DISCONTINUED | OUTPATIENT
Start: 2020-01-01 | End: 2020-01-01

## 2020-01-01 RX ORDER — HYDROCODONE BITARTRATE AND ACETAMINOPHEN 5; 325 MG/1; MG/1
1 TABLET ORAL EVERY 6 HOURS PRN
Status: DISCONTINUED | OUTPATIENT
Start: 2020-01-01 | End: 2020-01-01 | Stop reason: HOSPADM

## 2020-01-01 RX ORDER — CETIRIZINE HYDROCHLORIDE 10 MG/1
5 TABLET ORAL DAILY
Qty: 20 TABLET | Refills: 0 | Status: SHIPPED | OUTPATIENT
Start: 2020-01-01 | End: 2020-01-01

## 2020-01-01 RX ORDER — CEFDINIR 300 MG/1
300 CAPSULE ORAL DAILY
Start: 2020-01-01 | End: 2020-12-26

## 2020-01-01 RX ORDER — HYDROCODONE BITARTRATE AND ACETAMINOPHEN 5; 325 MG/1; MG/1
1 TABLET ORAL ONCE AS NEEDED
Status: DISCONTINUED | OUTPATIENT
Start: 2020-01-01 | End: 2020-01-01 | Stop reason: HOSPADM

## 2020-01-01 RX ORDER — OXYCODONE AND ACETAMINOPHEN 7.5; 325 MG/1; MG/1
2 TABLET ORAL ONCE AS NEEDED
Status: CANCELLED | OUTPATIENT
Start: 2020-01-01

## 2020-01-01 RX ORDER — PAROXETINE HYDROCHLORIDE 20 MG/1
20 TABLET, FILM COATED ORAL EVERY MORNING
Status: DISCONTINUED | OUTPATIENT
Start: 2020-01-01 | End: 2020-01-01 | Stop reason: HOSPADM

## 2020-01-01 RX ORDER — SUFENTANIL CITRATE 50 UG/ML
INJECTION EPIDURAL; INTRAVENOUS AS NEEDED
Status: DISCONTINUED | OUTPATIENT
Start: 2020-01-01 | End: 2020-01-01 | Stop reason: SURG

## 2020-01-01 RX ORDER — LISINOPRIL 10 MG/1
TABLET ORAL
Qty: 90 TABLET | Refills: 3 | Status: ON HOLD | OUTPATIENT
Start: 2020-01-01 | End: 2020-01-01

## 2020-01-01 RX ORDER — SODIUM CHLORIDE, SODIUM LACTATE, POTASSIUM CHLORIDE, CALCIUM CHLORIDE 600; 310; 30; 20 MG/100ML; MG/100ML; MG/100ML; MG/100ML
75 INJECTION, SOLUTION INTRAVENOUS CONTINUOUS
Status: DISCONTINUED | OUTPATIENT
Start: 2020-01-01 | End: 2020-01-01

## 2020-01-01 RX ORDER — PRAVASTATIN SODIUM 20 MG
TABLET ORAL
Qty: 90 TABLET | Refills: 3 | Status: ON HOLD | OUTPATIENT
Start: 2020-01-01 | End: 2020-01-01

## 2020-01-01 RX ORDER — KETOROLAC TROMETHAMINE 30 MG/ML
15 INJECTION, SOLUTION INTRAMUSCULAR; INTRAVENOUS EVERY 6 HOURS PRN
Status: DISCONTINUED | OUTPATIENT
Start: 2020-01-01 | End: 2020-01-01

## 2020-01-01 RX ORDER — METOPROLOL SUCCINATE 100 MG/1
100 TABLET, EXTENDED RELEASE ORAL
Status: DISCONTINUED | OUTPATIENT
Start: 2020-01-01 | End: 2020-01-01 | Stop reason: HOSPADM

## 2020-01-01 RX ORDER — ACETAMINOPHEN 160 MG/5ML
650 SOLUTION ORAL EVERY 4 HOURS PRN
Status: DISCONTINUED | OUTPATIENT
Start: 2020-01-01 | End: 2020-01-01 | Stop reason: HOSPADM

## 2020-01-01 RX ORDER — PANTOPRAZOLE SODIUM 40 MG/1
40 TABLET, DELAYED RELEASE ORAL
Status: DISCONTINUED | OUTPATIENT
Start: 2020-01-01 | End: 2020-01-01 | Stop reason: HOSPADM

## 2020-01-01 RX ORDER — SODIUM CHLORIDE 9 MG/ML
50 INJECTION, SOLUTION INTRAVENOUS CONTINUOUS
Status: DISCONTINUED | OUTPATIENT
Start: 2020-01-01 | End: 2020-01-01

## 2020-01-01 RX ORDER — FLUMAZENIL 0.1 MG/ML
0.2 INJECTION INTRAVENOUS AS NEEDED
Status: DISCONTINUED | OUTPATIENT
Start: 2020-01-01 | End: 2020-01-01 | Stop reason: HOSPADM

## 2020-01-01 RX ORDER — DOCUSATE SODIUM 100 MG/1
100 CAPSULE, LIQUID FILLED ORAL 2 TIMES DAILY
Status: DISCONTINUED | OUTPATIENT
Start: 2020-01-01 | End: 2020-01-01 | Stop reason: HOSPADM

## 2020-01-01 RX ORDER — DEXAMETHASONE 2 MG/1
2 TABLET ORAL EVERY 12 HOURS SCHEDULED
Status: COMPLETED | OUTPATIENT
Start: 2020-01-01 | End: 2020-01-01

## 2020-01-01 RX ORDER — MULTIVIT,CALC,MINS/IRON/FOLIC 9MG-400MCG
1 TABLET ORAL DAILY
Status: DISCONTINUED | OUTPATIENT
Start: 2020-01-01 | End: 2020-01-01 | Stop reason: HOSPADM

## 2020-01-01 RX ORDER — SODIUM CHLORIDE 9 MG/ML
INJECTION, SOLUTION INTRAVENOUS AS NEEDED
Status: DISCONTINUED | OUTPATIENT
Start: 2020-01-01 | End: 2020-01-01 | Stop reason: HOSPADM

## 2020-01-01 RX ORDER — DOCUSATE SODIUM 100 MG/1
100 CAPSULE, LIQUID FILLED ORAL DAILY
Status: DISCONTINUED | OUTPATIENT
Start: 2020-01-01 | End: 2020-01-01 | Stop reason: HOSPADM

## 2020-01-01 RX ORDER — FUROSEMIDE 40 MG/1
40 TABLET ORAL DAILY
Status: DISCONTINUED | OUTPATIENT
Start: 2020-01-01 | End: 2020-01-01 | Stop reason: HOSPADM

## 2020-01-01 RX ORDER — TOLVAPTAN 15 MG/1
15 TABLET ORAL ONCE
Status: COMPLETED | OUTPATIENT
Start: 2020-01-01 | End: 2020-01-01

## 2020-01-01 RX ORDER — AMOXICILLIN AND CLAVULANATE POTASSIUM 875; 125 MG/1; MG/1
1 TABLET, FILM COATED ORAL 2 TIMES DAILY
Qty: 20 TABLET | Refills: 0 | Status: SHIPPED | OUTPATIENT
Start: 2020-01-01 | End: 2020-01-01

## 2020-01-01 RX ORDER — METOPROLOL TARTRATE 50 MG/1
TABLET, FILM COATED ORAL
Qty: 270 TABLET | Refills: 3 | OUTPATIENT
Start: 2020-01-01

## 2020-01-01 RX ORDER — HYDROCODONE BITARTRATE AND ACETAMINOPHEN 10; 325 MG/1; MG/1
1 TABLET ORAL EVERY 4 HOURS PRN
Status: DISCONTINUED | OUTPATIENT
Start: 2020-01-01 | End: 2020-01-01 | Stop reason: HOSPADM

## 2020-01-01 RX ORDER — LISINOPRIL 10 MG/1
10 TABLET ORAL DAILY
Status: DISCONTINUED | OUTPATIENT
Start: 2020-01-01 | End: 2020-01-01

## 2020-01-01 RX ORDER — HYDROCODONE BITARTRATE AND ACETAMINOPHEN 5; 325 MG/1; MG/1
1 TABLET ORAL EVERY 6 HOURS PRN
Qty: 6 TABLET | Refills: 0 | Status: ON HOLD | OUTPATIENT
Start: 2020-01-01 | End: 2020-01-01

## 2020-01-01 RX ORDER — ACETAMINOPHEN 650 MG/1
650 SUPPOSITORY RECTAL EVERY 4 HOURS PRN
Status: DISCONTINUED | OUTPATIENT
Start: 2020-01-01 | End: 2020-01-01 | Stop reason: HOSPADM

## 2020-01-01 RX ORDER — PRAVASTATIN SODIUM 20 MG
20 TABLET ORAL DAILY
Status: ON HOLD | COMMUNITY
End: 2020-01-01 | Stop reason: SDUPTHER

## 2020-01-01 RX ORDER — MAGNESIUM HYDROXIDE 1200 MG/15ML
LIQUID ORAL AS NEEDED
Status: DISCONTINUED | OUTPATIENT
Start: 2020-01-01 | End: 2020-01-01 | Stop reason: HOSPADM

## 2020-01-01 RX ORDER — ZIPRASIDONE MESYLATE 20 MG/ML
10 INJECTION, POWDER, LYOPHILIZED, FOR SOLUTION INTRAMUSCULAR ONCE
Status: COMPLETED | OUTPATIENT
Start: 2020-01-01 | End: 2020-01-01

## 2020-01-01 RX ORDER — FUROSEMIDE 20 MG/1
TABLET ORAL
Qty: 90 TABLET | Refills: 3 | Status: ON HOLD | OUTPATIENT
Start: 2020-01-01 | End: 2020-01-01

## 2020-01-01 RX ORDER — NALOXONE HCL 0.4 MG/ML
0.04 VIAL (ML) INJECTION AS NEEDED
Status: CANCELLED | OUTPATIENT
Start: 2020-01-01

## 2020-01-01 RX ORDER — LABETALOL HYDROCHLORIDE 5 MG/ML
5 INJECTION, SOLUTION INTRAVENOUS
Status: CANCELLED | OUTPATIENT
Start: 2020-01-01

## 2020-01-01 RX ORDER — LABETALOL HYDROCHLORIDE 5 MG/ML
5 INJECTION, SOLUTION INTRAVENOUS
Status: DISCONTINUED | OUTPATIENT
Start: 2020-01-01 | End: 2020-01-01 | Stop reason: HOSPADM

## 2020-01-01 RX ORDER — FLUMAZENIL 0.1 MG/ML
0.2 INJECTION INTRAVENOUS AS NEEDED
Status: CANCELLED | OUTPATIENT
Start: 2020-01-01

## 2020-01-01 RX ORDER — METOPROLOL SUCCINATE 100 MG/1
100 TABLET, EXTENDED RELEASE ORAL DAILY
COMMUNITY

## 2020-01-01 RX ORDER — ONDANSETRON 2 MG/ML
INJECTION INTRAMUSCULAR; INTRAVENOUS AS NEEDED
Status: DISCONTINUED | OUTPATIENT
Start: 2020-01-01 | End: 2020-01-01 | Stop reason: SURG

## 2020-01-01 RX ORDER — ROCURONIUM BROMIDE 10 MG/ML
INJECTION, SOLUTION INTRAVENOUS AS NEEDED
Status: DISCONTINUED | OUTPATIENT
Start: 2020-01-01 | End: 2020-01-01 | Stop reason: SURG

## 2020-01-01 RX ORDER — ZIPRASIDONE MESYLATE 20 MG/ML
10 INJECTION, POWDER, LYOPHILIZED, FOR SOLUTION INTRAMUSCULAR ONCE
Status: DISCONTINUED | OUTPATIENT
Start: 2020-01-01 | End: 2020-01-01 | Stop reason: HOSPADM

## 2020-01-01 RX ORDER — ACETAMINOPHEN 325 MG/1
650 TABLET ORAL EVERY 6 HOURS PRN
COMMUNITY

## 2020-01-01 RX ORDER — OXYCODONE HYDROCHLORIDE 5 MG/1
5 TABLET ORAL EVERY 4 HOURS PRN
Status: DISCONTINUED | OUTPATIENT
Start: 2020-01-01 | End: 2020-01-01

## 2020-01-01 RX ORDER — BUPIVACAINE HCL/0.9 % NACL/PF 0.1 %
2 PLASTIC BAG, INJECTION (ML) EPIDURAL ONCE
Status: CANCELLED | OUTPATIENT
Start: 2020-01-01 | End: 2020-01-01

## 2020-01-01 RX ORDER — POTASSIUM CHLORIDE 14.9 MG/ML
20 INJECTION INTRAVENOUS
Status: COMPLETED | OUTPATIENT
Start: 2020-01-01 | End: 2020-01-01

## 2020-01-01 RX ORDER — BISACODYL 10 MG
10 SUPPOSITORY, RECTAL RECTAL DAILY
Status: DISCONTINUED | OUTPATIENT
Start: 2020-01-01 | End: 2020-01-01 | Stop reason: HOSPADM

## 2020-01-01 RX ORDER — HYDROMORPHONE HYDROCHLORIDE 1 MG/ML
0.5 INJECTION, SOLUTION INTRAMUSCULAR; INTRAVENOUS; SUBCUTANEOUS
Status: DISCONTINUED | OUTPATIENT
Start: 2020-01-01 | End: 2020-01-01

## 2020-01-01 RX ORDER — 3% SODIUM CHLORIDE 3 G/100ML
125 INJECTION, SOLUTION INTRAVENOUS ONCE
Status: DISCONTINUED | OUTPATIENT
Start: 2020-01-01 | End: 2020-01-01

## 2020-01-01 RX ORDER — CARVEDILOL 6.25 MG/1
12.5 TABLET ORAL 2 TIMES DAILY WITH MEALS
Status: DISCONTINUED | OUTPATIENT
Start: 2020-01-01 | End: 2020-01-01 | Stop reason: HOSPADM

## 2020-01-01 RX ORDER — HYDROMORPHONE HYDROCHLORIDE 1 MG/ML
0.25 INJECTION, SOLUTION INTRAMUSCULAR; INTRAVENOUS; SUBCUTANEOUS
Status: CANCELLED | OUTPATIENT
Start: 2020-01-01

## 2020-01-01 RX ORDER — FAMOTIDINE 10 MG/ML
20 INJECTION, SOLUTION INTRAVENOUS
Status: DISCONTINUED | OUTPATIENT
Start: 2020-01-01 | End: 2020-01-01 | Stop reason: HOSPADM

## 2020-01-01 RX ORDER — PROPOFOL 10 MG/ML
VIAL (ML) INTRAVENOUS AS NEEDED
Status: DISCONTINUED | OUTPATIENT
Start: 2020-01-01 | End: 2020-01-01 | Stop reason: SURG

## 2020-01-01 RX ORDER — POTASSIUM CHLORIDE 1.5 G/1.77G
40 POWDER, FOR SOLUTION ORAL AS NEEDED
Status: DISCONTINUED | OUTPATIENT
Start: 2020-01-01 | End: 2020-01-01 | Stop reason: HOSPADM

## 2020-01-01 RX ORDER — NALOXONE HCL 0.4 MG/ML
0.4 VIAL (ML) INJECTION
Status: DISCONTINUED | OUTPATIENT
Start: 2020-01-01 | End: 2020-01-01 | Stop reason: HOSPADM

## 2020-01-01 RX ORDER — DOCUSATE SODIUM 100 MG/1
100 CAPSULE, LIQUID FILLED ORAL NIGHTLY
COMMUNITY

## 2020-01-01 RX ORDER — FUROSEMIDE 40 MG/1
40 TABLET ORAL DAILY
Start: 2020-01-01 | End: 2020-01-01

## 2020-01-01 RX ORDER — HYDROCODONE BITARTRATE AND ACETAMINOPHEN 5; 325 MG/1; MG/1
2 TABLET ORAL EVERY 4 HOURS PRN
Qty: 360 TABLET | Refills: 0 | Status: ON HOLD | OUTPATIENT
Start: 2020-01-01 | End: 2020-01-01

## 2020-01-01 RX ORDER — POLYETHYLENE GLYCOL 3350 17 G/17G
17 POWDER, FOR SOLUTION ORAL DAILY
Status: DISCONTINUED | OUTPATIENT
Start: 2020-01-01 | End: 2020-01-01 | Stop reason: HOSPADM

## 2020-01-01 RX ORDER — ONDANSETRON 4 MG/1
4 TABLET, FILM COATED ORAL EVERY 6 HOURS PRN
Status: DISCONTINUED | OUTPATIENT
Start: 2020-01-01 | End: 2020-01-01 | Stop reason: HOSPADM

## 2020-01-01 RX ORDER — DEXTROSE MONOHYDRATE 50 MG/ML
6 INJECTION, SOLUTION INTRAVENOUS CONTINUOUS PRN
Status: DISCONTINUED | OUTPATIENT
Start: 2020-01-01 | End: 2020-01-01 | Stop reason: HOSPADM

## 2020-01-01 RX ORDER — POTASSIUM CHLORIDE 750 MG/1
40 CAPSULE, EXTENDED RELEASE ORAL EVERY 4 HOURS
Status: DISCONTINUED | OUTPATIENT
Start: 2020-01-01 | End: 2020-01-01

## 2020-01-01 RX ORDER — FENTANYL CITRATE 50 UG/ML
25 INJECTION, SOLUTION INTRAMUSCULAR; INTRAVENOUS
Status: CANCELLED | OUTPATIENT
Start: 2020-01-01

## 2020-01-01 RX ORDER — HYDROCODONE BITARTRATE AND ACETAMINOPHEN 5; 325 MG/1; MG/1
1 TABLET ORAL ONCE
Status: COMPLETED | OUTPATIENT
Start: 2020-01-01 | End: 2020-01-01

## 2020-01-01 RX ORDER — FUROSEMIDE 10 MG/ML
40 INJECTION INTRAMUSCULAR; INTRAVENOUS ONCE
Status: COMPLETED | OUTPATIENT
Start: 2020-01-01 | End: 2020-01-01

## 2020-01-01 RX ORDER — QUETIAPINE FUMARATE 25 MG/1
12.5 TABLET, FILM COATED ORAL NIGHTLY
Status: DISCONTINUED | OUTPATIENT
Start: 2020-01-01 | End: 2020-01-01

## 2020-01-01 RX ORDER — METOPROLOL SUCCINATE 100 MG/1
100 TABLET, EXTENDED RELEASE ORAL DAILY
Status: DISCONTINUED | OUTPATIENT
Start: 2020-01-01 | End: 2020-01-01 | Stop reason: HOSPADM

## 2020-01-01 RX ORDER — FUROSEMIDE 10 MG/ML
40 INJECTION INTRAMUSCULAR; INTRAVENOUS
Status: DISCONTINUED | OUTPATIENT
Start: 2020-01-01 | End: 2020-01-01

## 2020-01-01 RX ORDER — FUROSEMIDE 10 MG/ML
20 INJECTION INTRAMUSCULAR; INTRAVENOUS AS NEEDED
Status: ACTIVE | OUTPATIENT
Start: 2020-01-01 | End: 2020-01-01

## 2020-01-01 RX ADMIN — METOPROLOL SUCCINATE 100 MG: 100 TABLET, EXTENDED RELEASE ORAL at 09:18

## 2020-01-01 RX ADMIN — SODIUM CHLORIDE, PRESERVATIVE FREE 10 ML: 5 INJECTION INTRAVENOUS at 20:37

## 2020-01-01 RX ADMIN — FUROSEMIDE 20 MG: 10 INJECTION, SOLUTION INTRAMUSCULAR; INTRAVENOUS at 13:09

## 2020-01-01 RX ADMIN — PANTOPRAZOLE SODIUM 40 MG: 40 TABLET, DELAYED RELEASE ORAL at 06:19

## 2020-01-01 RX ADMIN — LISINOPRIL 5 MG: 5 TABLET ORAL at 08:41

## 2020-01-01 RX ADMIN — SUCCINYLCHOLINE CHLORIDE 120 MG: 20 INJECTION, SOLUTION INTRAMUSCULAR; INTRAVENOUS at 07:11

## 2020-01-01 RX ADMIN — Medication 1 TABLET: at 10:18

## 2020-01-01 RX ADMIN — PANTOPRAZOLE SODIUM 40 MG: 40 TABLET, DELAYED RELEASE ORAL at 06:44

## 2020-01-01 RX ADMIN — SODIUM CHLORIDE, PRESERVATIVE FREE 10 ML: 5 INJECTION INTRAVENOUS at 21:50

## 2020-01-01 RX ADMIN — PAROXETINE 20 MG: 20 TABLET, FILM COATED ORAL at 09:18

## 2020-01-01 RX ADMIN — SACUBITRIL AND VALSARTAN 1 TABLET: 24; 26 TABLET, FILM COATED ORAL at 08:11

## 2020-01-01 RX ADMIN — REGADENOSON 0.4 MG: 0.08 INJECTION, SOLUTION INTRAVENOUS at 10:09

## 2020-01-01 RX ADMIN — LISINOPRIL 10 MG: 10 TABLET ORAL at 08:37

## 2020-01-01 RX ADMIN — FUROSEMIDE 20 MG: 20 TABLET ORAL at 11:32

## 2020-01-01 RX ADMIN — PANTOPRAZOLE SODIUM 40 MG: 40 TABLET, DELAYED RELEASE ORAL at 05:52

## 2020-01-01 RX ADMIN — FUROSEMIDE 20 MG: 20 TABLET ORAL at 08:57

## 2020-01-01 RX ADMIN — ZIPRASIDONE MESYLATE 10 MG: 20 INJECTION, POWDER, LYOPHILIZED, FOR SOLUTION INTRAMUSCULAR at 20:08

## 2020-01-01 RX ADMIN — DEXAMETHASONE 4 MG: 4 TABLET ORAL at 20:23

## 2020-01-01 RX ADMIN — SODIUM CHLORIDE, PRESERVATIVE FREE 3 ML: 5 INJECTION INTRAVENOUS at 21:49

## 2020-01-01 RX ADMIN — HYDROCODONE BITARTRATE AND ACETAMINOPHEN 1 TABLET: 7.5; 325 TABLET ORAL at 10:33

## 2020-01-01 RX ADMIN — PANTOPRAZOLE SODIUM 40 MG: 40 TABLET, DELAYED RELEASE ORAL at 06:17

## 2020-01-01 RX ADMIN — DOCUSATE SODIUM 100 MG: 100 CAPSULE ORAL at 21:45

## 2020-01-01 RX ADMIN — FENTANYL CITRATE 25 MCG: 50 INJECTION INTRAMUSCULAR; INTRAVENOUS at 13:31

## 2020-01-01 RX ADMIN — PANTOPRAZOLE SODIUM 40 MG: 40 TABLET, DELAYED RELEASE ORAL at 05:55

## 2020-01-01 RX ADMIN — PANTOPRAZOLE SODIUM 40 MG: 40 TABLET, DELAYED RELEASE ORAL at 06:13

## 2020-01-01 RX ADMIN — METOPROLOL SUCCINATE 100 MG: 100 TABLET, EXTENDED RELEASE ORAL at 09:02

## 2020-01-01 RX ADMIN — METOPROLOL TARTRATE 100 MG: 50 TABLET, FILM COATED ORAL at 09:24

## 2020-01-01 RX ADMIN — HYDROCODONE BITARTRATE AND ACETAMINOPHEN 1 TABLET: 5; 325 TABLET ORAL at 15:03

## 2020-01-01 RX ADMIN — SODIUM CHLORIDE, PRESERVATIVE FREE 10 ML: 5 INJECTION INTRAVENOUS at 08:58

## 2020-01-01 RX ADMIN — PRAVASTATIN SODIUM 20 MG: 20 TABLET ORAL at 20:09

## 2020-01-01 RX ADMIN — RIVAROXABAN 20 MG: 20 TABLET, FILM COATED ORAL at 18:19

## 2020-01-01 RX ADMIN — DICLOFENAC 2 G: 10 GEL TOPICAL at 08:17

## 2020-01-01 RX ADMIN — DEXAMETHASONE SODIUM PHOSPHATE 6 MG: 10 INJECTION INTRAMUSCULAR; INTRAVENOUS at 12:46

## 2020-01-01 RX ADMIN — VASOPRESSIN 1 ML: 20 INJECTION INTRAVENOUS at 07:35

## 2020-01-01 RX ADMIN — Medication 2 DROP: at 09:15

## 2020-01-01 RX ADMIN — PRAVASTATIN SODIUM 20 MG: 20 TABLET ORAL at 21:50

## 2020-01-01 RX ADMIN — AZITHROMYCIN DIHYDRATE 500 MG: 500 INJECTION, POWDER, LYOPHILIZED, FOR SOLUTION INTRAVENOUS at 15:35

## 2020-01-01 RX ADMIN — QUETIAPINE FUMARATE 12.5 MG: 25 TABLET ORAL at 21:09

## 2020-01-01 RX ADMIN — SODIUM CHLORIDE, PRESERVATIVE FREE 10 ML: 5 INJECTION INTRAVENOUS at 00:25

## 2020-01-01 RX ADMIN — SODIUM CHLORIDE, PRESERVATIVE FREE 10 ML: 5 INJECTION INTRAVENOUS at 09:37

## 2020-01-01 RX ADMIN — IOPAMIDOL 20 ML: 408 INJECTION, SOLUTION INTRATHECAL at 16:50

## 2020-01-01 RX ADMIN — DEXAMETHASONE SODIUM PHOSPHATE 6 MG: 10 INJECTION INTRAMUSCULAR; INTRAVENOUS at 11:30

## 2020-01-01 RX ADMIN — SODIUM CHLORIDE, PRESERVATIVE FREE 10 ML: 5 INJECTION INTRAVENOUS at 08:17

## 2020-01-01 RX ADMIN — FUROSEMIDE 20 MG: 20 TABLET ORAL at 08:15

## 2020-01-01 RX ADMIN — FUROSEMIDE 20 MG: 20 TABLET ORAL at 08:41

## 2020-01-01 RX ADMIN — DOCUSATE SODIUM 100 MG: 100 CAPSULE ORAL at 08:57

## 2020-01-01 RX ADMIN — METOPROLOL TARTRATE 100 MG: 50 TABLET, FILM COATED ORAL at 09:38

## 2020-01-01 RX ADMIN — RIVAROXABAN 20 MG: 20 TABLET, FILM COATED ORAL at 17:37

## 2020-01-01 RX ADMIN — METOPROLOL SUCCINATE 100 MG: 100 TABLET, EXTENDED RELEASE ORAL at 11:30

## 2020-01-01 RX ADMIN — PAROXETINE 20 MG: 20 TABLET, FILM COATED ORAL at 11:30

## 2020-01-01 RX ADMIN — RIVAROXABAN 20 MG: 20 TABLET, FILM COATED ORAL at 17:38

## 2020-01-01 RX ADMIN — SODIUM CHLORIDE 60 ML/HR: 9 INJECTION, SOLUTION INTRAVENOUS at 05:49

## 2020-01-01 RX ADMIN — DEXAMETHASONE SODIUM PHOSPHATE 4 MG: 4 INJECTION, SOLUTION INTRAMUSCULAR; INTRAVENOUS at 13:52

## 2020-01-01 RX ADMIN — SUFENTANIL CITRATE 10 MCG: 50 INJECTION EPIDURAL; INTRAVENOUS at 11:23

## 2020-01-01 RX ADMIN — DICLOFENAC 2 G: 10 GEL TOPICAL at 17:11

## 2020-01-01 RX ADMIN — DEXAMETHASONE SODIUM PHOSPHATE 6 MG: 10 INJECTION INTRAMUSCULAR; INTRAVENOUS at 06:19

## 2020-01-01 RX ADMIN — PRAVASTATIN SODIUM 20 MG: 20 TABLET ORAL at 09:01

## 2020-01-01 RX ADMIN — VASOPRESSIN 1 ML: 20 INJECTION INTRAVENOUS at 07:37

## 2020-01-01 RX ADMIN — ONDANSETRON HYDROCHLORIDE 4 MG: 2 SOLUTION INTRAMUSCULAR; INTRAVENOUS at 08:08

## 2020-01-01 RX ADMIN — DEXAMETHASONE 2 MG: 2 TABLET ORAL at 21:50

## 2020-01-01 RX ADMIN — DEXAMETHASONE SODIUM PHOSPHATE 6 MG: 10 INJECTION INTRAMUSCULAR; INTRAVENOUS at 18:45

## 2020-01-01 RX ADMIN — METOPROLOL SUCCINATE 100 MG: 100 TABLET, EXTENDED RELEASE ORAL at 08:58

## 2020-01-01 RX ADMIN — PANTOPRAZOLE SODIUM 40 MG: 40 TABLET, DELAYED RELEASE ORAL at 05:21

## 2020-01-01 RX ADMIN — DOCUSATE SODIUM 100 MG: 100 CAPSULE ORAL at 11:30

## 2020-01-01 RX ADMIN — SODIUM CHLORIDE, PRESERVATIVE FREE 10 ML: 5 INJECTION INTRAVENOUS at 10:19

## 2020-01-01 RX ADMIN — PRAVASTATIN SODIUM 20 MG: 20 TABLET ORAL at 08:11

## 2020-01-01 RX ADMIN — SODIUM CHLORIDE 100 ML/HR: 9 INJECTION, SOLUTION INTRAVENOUS at 13:41

## 2020-01-01 RX ADMIN — DEXAMETHASONE SODIUM PHOSPHATE 6 MG: 10 INJECTION INTRAMUSCULAR; INTRAVENOUS at 18:05

## 2020-01-01 RX ADMIN — TOLVAPTAN 15 MG: 15 TABLET ORAL at 08:37

## 2020-01-01 RX ADMIN — PANTOPRAZOLE SODIUM 40 MG: 40 TABLET, DELAYED RELEASE ORAL at 05:24

## 2020-01-01 RX ADMIN — FUROSEMIDE 20 MG: 20 TABLET ORAL at 09:02

## 2020-01-01 RX ADMIN — LISINOPRIL 10 MG: 10 TABLET ORAL at 08:47

## 2020-01-01 RX ADMIN — ACETAMINOPHEN 650 MG: 325 TABLET, FILM COATED ORAL at 21:45

## 2020-01-01 RX ADMIN — LISINOPRIL 10 MG: 10 TABLET ORAL at 09:31

## 2020-01-01 RX ADMIN — PRAVASTATIN SODIUM 20 MG: 20 TABLET ORAL at 20:31

## 2020-01-01 RX ADMIN — DEXAMETHASONE SODIUM PHOSPHATE 6 MG: 10 INJECTION INTRAMUSCULAR; INTRAVENOUS at 00:09

## 2020-01-01 RX ADMIN — SODIUM CHLORIDE, POTASSIUM CHLORIDE, SODIUM LACTATE AND CALCIUM CHLORIDE 1000 ML: 600; 310; 30; 20 INJECTION, SOLUTION INTRAVENOUS at 06:39

## 2020-01-01 RX ADMIN — SODIUM CHLORIDE, PRESERVATIVE FREE 10 ML: 5 INJECTION INTRAVENOUS at 20:12

## 2020-01-01 RX ADMIN — PRAVASTATIN SODIUM 20 MG: 20 TABLET ORAL at 22:29

## 2020-01-01 RX ADMIN — PRAVASTATIN SODIUM 20 MG: 20 TABLET ORAL at 21:58

## 2020-01-01 RX ADMIN — ACETAMINOPHEN 650 MG: 325 TABLET, FILM COATED ORAL at 22:38

## 2020-01-01 RX ADMIN — SODIUM CHLORIDE, PRESERVATIVE FREE 10 ML: 5 INJECTION INTRAVENOUS at 20:16

## 2020-01-01 RX ADMIN — SODIUM CHLORIDE, PRESERVATIVE FREE 10 ML: 5 INJECTION INTRAVENOUS at 09:03

## 2020-01-01 RX ADMIN — SODIUM CHLORIDE, PRESERVATIVE FREE 3 ML: 5 INJECTION INTRAVENOUS at 09:03

## 2020-01-01 RX ADMIN — BISACODYL 10 MG: 10 SUPPOSITORY RECTAL at 08:31

## 2020-01-01 RX ADMIN — Medication 2 DROP: at 08:47

## 2020-01-01 RX ADMIN — DEXAMETHASONE SODIUM PHOSPHATE 6 MG: 10 INJECTION INTRAMUSCULAR; INTRAVENOUS at 18:36

## 2020-01-01 RX ADMIN — DICLOFENAC 2 G: 10 GEL TOPICAL at 11:06

## 2020-01-01 RX ADMIN — LISINOPRIL 10 MG: 10 TABLET ORAL at 08:05

## 2020-01-01 RX ADMIN — METOPROLOL SUCCINATE 100 MG: 100 TABLET, EXTENDED RELEASE ORAL at 09:31

## 2020-01-01 RX ADMIN — SODIUM CHLORIDE, PRESERVATIVE FREE 10 ML: 5 INJECTION INTRAVENOUS at 20:22

## 2020-01-01 RX ADMIN — DEXAMETHASONE SODIUM PHOSPHATE 6 MG: 10 INJECTION INTRAMUSCULAR; INTRAVENOUS at 05:52

## 2020-01-01 RX ADMIN — FUROSEMIDE 20 MG: 20 TABLET ORAL at 09:39

## 2020-01-01 RX ADMIN — LIDOCAINE HYDROCHLORIDE 100 MG: 20 INJECTION, SOLUTION INTRAVENOUS at 07:11

## 2020-01-01 RX ADMIN — SODIUM CHLORIDE, PRESERVATIVE FREE 10 ML: 5 INJECTION INTRAVENOUS at 09:14

## 2020-01-01 RX ADMIN — SODIUM CHLORIDE, PRESERVATIVE FREE 10 ML: 5 INJECTION INTRAVENOUS at 21:36

## 2020-01-01 RX ADMIN — HYDROCODONE BITARTRATE AND ACETAMINOPHEN 1 TABLET: 7.5; 325 TABLET ORAL at 21:39

## 2020-01-01 RX ADMIN — HYDROCODONE BITARTRATE AND ACETAMINOPHEN 1 TABLET: 7.5; 325 TABLET ORAL at 18:23

## 2020-01-01 RX ADMIN — PRAVASTATIN SODIUM 20 MG: 20 TABLET ORAL at 20:44

## 2020-01-01 RX ADMIN — LISINOPRIL 10 MG: 10 TABLET ORAL at 09:24

## 2020-01-01 RX ADMIN — PANTOPRAZOLE SODIUM 40 MG: 40 TABLET, DELAYED RELEASE ORAL at 05:48

## 2020-01-01 RX ADMIN — IOPAMIDOL 100 ML: 755 INJECTION, SOLUTION INTRAVENOUS at 10:45

## 2020-01-01 RX ADMIN — FUROSEMIDE 40 MG: 10 INJECTION, SOLUTION INTRAVENOUS at 19:32

## 2020-01-01 RX ADMIN — PRAVASTATIN SODIUM 20 MG: 20 TABLET ORAL at 08:17

## 2020-01-01 RX ADMIN — METOPROLOL SUCCINATE 100 MG: 100 TABLET, EXTENDED RELEASE ORAL at 08:35

## 2020-01-01 RX ADMIN — SODIUM CHLORIDE, PRESERVATIVE FREE 3 ML: 5 INJECTION INTRAVENOUS at 09:12

## 2020-01-01 RX ADMIN — FUROSEMIDE 20 MG: 10 INJECTION, SOLUTION INTRAMUSCULAR; INTRAVENOUS at 22:24

## 2020-01-01 RX ADMIN — FUROSEMIDE 20 MG: 20 TABLET ORAL at 09:18

## 2020-01-01 RX ADMIN — POLYETHYLENE GLYCOL (3350) 17 G: 17 POWDER, FOR SOLUTION ORAL at 08:59

## 2020-01-01 RX ADMIN — SODIUM CHLORIDE, PRESERVATIVE FREE 10 ML: 5 INJECTION INTRAVENOUS at 20:45

## 2020-01-01 RX ADMIN — LISINOPRIL 10 MG: 10 TABLET ORAL at 09:13

## 2020-01-01 RX ADMIN — CEFTRIAXONE SODIUM 1 G: 1 INJECTION, POWDER, FOR SOLUTION INTRAMUSCULAR; INTRAVENOUS at 17:10

## 2020-01-01 RX ADMIN — POTASSIUM CHLORIDE 20 MEQ: 14.9 INJECTION, SOLUTION INTRAVENOUS at 22:32

## 2020-01-01 RX ADMIN — DOCUSATE SODIUM 100 MG: 100 CAPSULE ORAL at 09:13

## 2020-01-01 RX ADMIN — DEXAMETHASONE 2 MG: 2 TABLET ORAL at 08:15

## 2020-01-01 RX ADMIN — SUFENTANIL CITRATE 20 MCG: 50 INJECTION EPIDURAL; INTRAVENOUS at 07:11

## 2020-01-01 RX ADMIN — QUETIAPINE FUMARATE 25 MG: 25 TABLET, FILM COATED ORAL at 20:09

## 2020-01-01 RX ADMIN — CEFAZOLIN SODIUM 2 G: 10 INJECTION, POWDER, FOR SOLUTION INTRAVENOUS at 13:34

## 2020-01-01 RX ADMIN — DOCUSATE SODIUM 100 MG: 100 CAPSULE ORAL at 09:14

## 2020-01-01 RX ADMIN — SODIUM CHLORIDE, PRESERVATIVE FREE 10 ML: 5 INJECTION INTRAVENOUS at 21:51

## 2020-01-01 RX ADMIN — SODIUM CHLORIDE, PRESERVATIVE FREE 10 ML: 5 INJECTION INTRAVENOUS at 21:24

## 2020-01-01 RX ADMIN — DICLOFENAC 2 G: 10 GEL TOPICAL at 11:59

## 2020-01-01 RX ADMIN — ACETAMINOPHEN 650 MG: 325 TABLET, FILM COATED ORAL at 06:35

## 2020-01-01 RX ADMIN — SODIUM CHLORIDE, PRESERVATIVE FREE 10 ML: 5 INJECTION INTRAVENOUS at 20:36

## 2020-01-01 RX ADMIN — Medication 1 TABLET: at 11:30

## 2020-01-01 RX ADMIN — LIDOCAINE HYDROCHLORIDE 5 ML: 10 INJECTION, SOLUTION INFILTRATION; PERINEURAL at 16:50

## 2020-01-01 RX ADMIN — ACETAMINOPHEN 650 MG: 325 TABLET, FILM COATED ORAL at 10:56

## 2020-01-01 RX ADMIN — SODIUM CHLORIDE, PRESERVATIVE FREE 10 ML: 5 INJECTION INTRAVENOUS at 08:41

## 2020-01-01 RX ADMIN — DEXAMETHASONE 4 MG: 4 TABLET ORAL at 14:17

## 2020-01-01 RX ADMIN — METOPROLOL TARTRATE 100 MG: 50 TABLET, FILM COATED ORAL at 09:13

## 2020-01-01 RX ADMIN — SACUBITRIL AND VALSARTAN 1 TABLET: 24; 26 TABLET, FILM COATED ORAL at 08:17

## 2020-01-01 RX ADMIN — SODIUM CHLORIDE 500 ML: 9 INJECTION, SOLUTION INTRAVENOUS at 15:21

## 2020-01-01 RX ADMIN — APIXABAN 5 MG: 5 TABLET, FILM COATED ORAL at 09:18

## 2020-01-01 RX ADMIN — NALOXONE HYDROCHLORIDE 0.4 MG: 0.4 INJECTION, SOLUTION INTRAMUSCULAR; INTRAVENOUS; SUBCUTANEOUS at 17:56

## 2020-01-01 RX ADMIN — DEXAMETHASONE SODIUM PHOSPHATE 6 MG: 10 INJECTION INTRAMUSCULAR; INTRAVENOUS at 18:32

## 2020-01-01 RX ADMIN — DICLOFENAC 2 G: 10 GEL TOPICAL at 20:37

## 2020-01-01 RX ADMIN — PRAVASTATIN SODIUM 20 MG: 20 TABLET ORAL at 20:34

## 2020-01-01 RX ADMIN — PRAVASTATIN SODIUM 20 MG: 20 TABLET ORAL at 20:15

## 2020-01-01 RX ADMIN — METOPROLOL TARTRATE 75 MG: 50 TABLET, FILM COATED ORAL at 08:36

## 2020-01-01 RX ADMIN — SODIUM CHLORIDE, PRESERVATIVE FREE 10 ML: 5 INJECTION INTRAVENOUS at 21:11

## 2020-01-01 RX ADMIN — SUFENTANIL CITRATE 10 MCG: 50 INJECTION EPIDURAL; INTRAVENOUS at 08:52

## 2020-01-01 RX ADMIN — SODIUM CHLORIDE, PRESERVATIVE FREE 10 ML: 5 INJECTION INTRAVENOUS at 09:22

## 2020-01-01 RX ADMIN — SODIUM CHLORIDE, PRESERVATIVE FREE 3 ML: 5 INJECTION INTRAVENOUS at 21:46

## 2020-01-01 RX ADMIN — SODIUM CHLORIDE, POTASSIUM CHLORIDE, SODIUM LACTATE AND CALCIUM CHLORIDE 1000 ML: 600; 310; 30; 20 INJECTION, SOLUTION INTRAVENOUS at 06:40

## 2020-01-01 RX ADMIN — PANTOPRAZOLE SODIUM 40 MG: 40 TABLET, DELAYED RELEASE ORAL at 06:23

## 2020-01-01 RX ADMIN — CARVEDILOL 12.5 MG: 6.25 TABLET, FILM COATED ORAL at 09:01

## 2020-01-01 RX ADMIN — FUROSEMIDE 20 MG: 20 TABLET ORAL at 09:14

## 2020-01-01 RX ADMIN — PRAVASTATIN SODIUM 20 MG: 20 TABLET ORAL at 22:12

## 2020-01-01 RX ADMIN — FUROSEMIDE 20 MG: 20 TABLET ORAL at 08:05

## 2020-01-01 RX ADMIN — LIDOCAINE HYDROCHLORIDE 1 EACH: 40 SOLUTION TOPICAL at 07:11

## 2020-01-01 RX ADMIN — FUROSEMIDE 20 MG: 20 TABLET ORAL at 08:58

## 2020-01-01 RX ADMIN — TETANUS TOXOID, REDUCED DIPHTHERIA TOXOID AND ACELLULAR PERTUSSIS VACCINE, ADSORBED 0.5 ML: 5; 2.5; 8; 8; 2.5 SUSPENSION INTRAMUSCULAR at 17:25

## 2020-01-01 RX ADMIN — CEFTRIAXONE SODIUM 1 G: 1 INJECTION, POWDER, FOR SOLUTION INTRAMUSCULAR; INTRAVENOUS at 16:14

## 2020-01-01 RX ADMIN — FUROSEMIDE 40 MG: 10 INJECTION, SOLUTION INTRAMUSCULAR; INTRAVENOUS at 13:21

## 2020-01-01 RX ADMIN — SODIUM CHLORIDE, PRESERVATIVE FREE 10 ML: 5 INJECTION INTRAVENOUS at 09:31

## 2020-01-01 RX ADMIN — CEFAZOLIN SODIUM 2 G: 10 INJECTION, POWDER, FOR SOLUTION INTRAVENOUS at 21:58

## 2020-01-01 RX ADMIN — DICLOFENAC 2 G: 10 GEL TOPICAL at 18:25

## 2020-01-01 RX ADMIN — METOPROLOL TARTRATE 75 MG: 50 TABLET, FILM COATED ORAL at 20:23

## 2020-01-01 RX ADMIN — FUROSEMIDE 40 MG: 40 TABLET ORAL at 08:11

## 2020-01-01 RX ADMIN — DEXAMETHASONE 4 MG: 4 TABLET ORAL at 09:01

## 2020-01-01 RX ADMIN — DEXAMETHASONE SODIUM PHOSPHATE 6 MG: 10 INJECTION INTRAMUSCULAR; INTRAVENOUS at 23:37

## 2020-01-01 RX ADMIN — PRAVASTATIN SODIUM 20 MG: 20 TABLET ORAL at 20:22

## 2020-01-01 RX ADMIN — Medication 1 TABLET: at 09:01

## 2020-01-01 RX ADMIN — POLYETHYLENE GLYCOL (3350) 17 G: 17 POWDER, FOR SOLUTION ORAL at 08:31

## 2020-01-01 RX ADMIN — POLYETHYLENE GLYCOL (3350) 17 G: 17 POWDER, FOR SOLUTION ORAL at 09:14

## 2020-01-01 RX ADMIN — CARVEDILOL 12.5 MG: 6.25 TABLET, FILM COATED ORAL at 08:21

## 2020-01-01 RX ADMIN — RIVAROXABAN 20 MG: 20 TABLET, FILM COATED ORAL at 17:23

## 2020-01-01 RX ADMIN — FUROSEMIDE 20 MG: 20 TABLET ORAL at 09:13

## 2020-01-01 RX ADMIN — PANTOPRAZOLE SODIUM 40 MG: 40 TABLET, DELAYED RELEASE ORAL at 08:58

## 2020-01-01 RX ADMIN — LISINOPRIL 5 MG: 5 TABLET ORAL at 11:30

## 2020-01-01 RX ADMIN — SODIUM CHLORIDE, PRESERVATIVE FREE 10 ML: 5 INJECTION INTRAVENOUS at 08:59

## 2020-01-01 RX ADMIN — PRAVASTATIN SODIUM 20 MG: 20 TABLET ORAL at 10:18

## 2020-01-01 RX ADMIN — PROPOFOL 100 MG: 10 INJECTION, EMULSION INTRAVENOUS at 07:11

## 2020-01-01 RX ADMIN — SODIUM CHLORIDE, PRESERVATIVE FREE 3 ML: 5 INJECTION INTRAVENOUS at 09:31

## 2020-01-01 RX ADMIN — PRAVASTATIN SODIUM 20 MG: 20 TABLET ORAL at 21:36

## 2020-01-01 RX ADMIN — DICLOFENAC 2 G: 10 GEL TOPICAL at 22:38

## 2020-01-01 RX ADMIN — PANTOPRAZOLE SODIUM 40 MG: 40 TABLET, DELAYED RELEASE ORAL at 05:42

## 2020-01-01 RX ADMIN — DEXAMETHASONE SODIUM PHOSPHATE 6 MG: 10 INJECTION INTRAMUSCULAR; INTRAVENOUS at 23:20

## 2020-01-01 RX ADMIN — DEXAMETHASONE SODIUM PHOSPHATE 6 MG: 10 INJECTION INTRAMUSCULAR; INTRAVENOUS at 05:42

## 2020-01-01 RX ADMIN — ROCURONIUM BROMIDE 5 MG: 10 INJECTION INTRAVENOUS at 07:11

## 2020-01-01 RX ADMIN — ACETAMINOPHEN 650 MG: 325 TABLET, FILM COATED ORAL at 08:47

## 2020-01-01 RX ADMIN — SODIUM CHLORIDE, PRESERVATIVE FREE 10 ML: 5 INJECTION INTRAVENOUS at 09:25

## 2020-01-01 RX ADMIN — ACETAMINOPHEN 650 MG: 325 TABLET, FILM COATED ORAL at 20:36

## 2020-01-01 RX ADMIN — POTASSIUM CHLORIDE 40 MEQ: 750 CAPSULE, EXTENDED RELEASE ORAL at 14:39

## 2020-01-01 RX ADMIN — ONDANSETRON HYDROCHLORIDE 4 MG: 2 SOLUTION INTRAMUSCULAR; INTRAVENOUS at 12:45

## 2020-01-01 RX ADMIN — SODIUM CHLORIDE, PRESERVATIVE FREE 10 ML: 5 INJECTION INTRAVENOUS at 10:35

## 2020-01-01 RX ADMIN — OXYCODONE HYDROCHLORIDE 5 MG: 5 TABLET ORAL at 08:35

## 2020-01-01 RX ADMIN — SODIUM CHLORIDE 50 ML/HR: 9 INJECTION, SOLUTION INTRAVENOUS at 09:16

## 2020-01-01 RX ADMIN — LISINOPRIL 10 MG: 10 TABLET ORAL at 08:57

## 2020-01-01 RX ADMIN — SODIUM CHLORIDE, PRESERVATIVE FREE 10 ML: 5 INJECTION INTRAVENOUS at 21:16

## 2020-01-01 RX ADMIN — SODIUM CHLORIDE, PRESERVATIVE FREE 3 ML: 5 INJECTION INTRAVENOUS at 08:47

## 2020-01-01 RX ADMIN — FENTANYL CITRATE 25 MCG: 50 INJECTION INTRAMUSCULAR; INTRAVENOUS at 13:26

## 2020-01-01 RX ADMIN — RIVAROXABAN 20 MG: 20 TABLET, FILM COATED ORAL at 17:11

## 2020-01-01 RX ADMIN — METOPROLOL SUCCINATE 100 MG: 100 TABLET, EXTENDED RELEASE ORAL at 09:13

## 2020-01-01 RX ADMIN — SODIUM CHLORIDE, PRESERVATIVE FREE 10 ML: 5 INJECTION INTRAVENOUS at 09:16

## 2020-01-01 RX ADMIN — METOPROLOL SUCCINATE 100 MG: 100 TABLET, EXTENDED RELEASE ORAL at 06:50

## 2020-01-01 RX ADMIN — TECHNETIUM TC 99M SESTAMIBI 1 DOSE: 1 INJECTION INTRAVENOUS at 12:30

## 2020-01-01 RX ADMIN — DEXAMETHASONE SODIUM PHOSPHATE 4 MG: 4 INJECTION, SOLUTION INTRAMUSCULAR; INTRAVENOUS at 05:41

## 2020-01-01 RX ADMIN — DICLOFENAC 2 G: 10 GEL TOPICAL at 08:11

## 2020-01-01 RX ADMIN — PRAVASTATIN SODIUM 20 MG: 20 TABLET ORAL at 21:10

## 2020-01-01 RX ADMIN — MORPHINE SULFATE 2 MG: 2 INJECTION, SOLUTION INTRAMUSCULAR; INTRAVENOUS at 13:23

## 2020-01-01 RX ADMIN — Medication 2 G: at 07:15

## 2020-01-01 RX ADMIN — PERFLUTREN 8.48 MG: 6.52 INJECTION, SUSPENSION INTRAVENOUS at 15:35

## 2020-01-01 RX ADMIN — CARVEDILOL 12.5 MG: 6.25 TABLET, FILM COATED ORAL at 18:19

## 2020-01-01 RX ADMIN — DEXAMETHASONE SODIUM PHOSPHATE 6 MG: 10 INJECTION INTRAMUSCULAR; INTRAVENOUS at 17:18

## 2020-01-01 RX ADMIN — IOPAMIDOL 100 ML: 612 INJECTION, SOLUTION INTRAVENOUS at 15:03

## 2020-01-01 RX ADMIN — DEXAMETHASONE 2 MG: 2 TABLET ORAL at 21:08

## 2020-01-01 RX ADMIN — PANTOPRAZOLE SODIUM 40 MG: 40 TABLET, DELAYED RELEASE ORAL at 05:46

## 2020-01-01 RX ADMIN — DEXAMETHASONE 2 MG: 2 TABLET ORAL at 09:31

## 2020-01-01 RX ADMIN — POTASSIUM CHLORIDE 20 MEQ: 14.9 INJECTION, SOLUTION INTRAVENOUS at 19:32

## 2020-01-01 RX ADMIN — NALOXONE HYDROCHLORIDE 0.4 MG: 0.4 INJECTION, SOLUTION INTRAMUSCULAR; INTRAVENOUS; SUBCUTANEOUS at 20:22

## 2020-01-01 RX ADMIN — QUETIAPINE FUMARATE 12.5 MG: 25 TABLET ORAL at 20:23

## 2020-01-01 RX ADMIN — SODIUM CHLORIDE, POTASSIUM CHLORIDE, SODIUM LACTATE AND CALCIUM CHLORIDE 100 ML/HR: 600; 310; 30; 20 INJECTION, SOLUTION INTRAVENOUS at 13:25

## 2020-01-01 RX ADMIN — SODIUM CHLORIDE, PRESERVATIVE FREE 10 ML: 5 INJECTION INTRAVENOUS at 22:12

## 2020-01-01 RX ADMIN — LISINOPRIL 10 MG: 10 TABLET ORAL at 08:15

## 2020-01-01 RX ADMIN — SODIUM CHLORIDE 100 ML/HR: 9 INJECTION, SOLUTION INTRAVENOUS at 00:25

## 2020-01-01 RX ADMIN — SODIUM CHLORIDE, PRESERVATIVE FREE 10 ML: 5 INJECTION INTRAVENOUS at 20:39

## 2020-01-01 RX ADMIN — LISINOPRIL 10 MG: 10 TABLET ORAL at 09:22

## 2020-01-01 RX ADMIN — PANTOPRAZOLE SODIUM 40 MG: 40 TABLET, DELAYED RELEASE ORAL at 05:12

## 2020-01-01 RX ADMIN — Medication 1 TABLET: at 08:21

## 2020-01-01 RX ADMIN — CARVEDILOL 12.5 MG: 6.25 TABLET, FILM COATED ORAL at 17:38

## 2020-01-01 RX ADMIN — HYDROCODONE BITARTRATE AND ACETAMINOPHEN 1 TABLET: 7.5; 325 TABLET ORAL at 13:25

## 2020-01-01 RX ADMIN — SODIUM CHLORIDE, PRESERVATIVE FREE 10 ML: 5 INJECTION INTRAVENOUS at 08:36

## 2020-01-01 RX ADMIN — LISINOPRIL 10 MG: 10 TABLET ORAL at 09:01

## 2020-01-01 RX ADMIN — SODIUM CHLORIDE, PRESERVATIVE FREE 10 ML: 5 INJECTION INTRAVENOUS at 20:32

## 2020-01-01 RX ADMIN — CARVEDILOL 12.5 MG: 6.25 TABLET, FILM COATED ORAL at 17:50

## 2020-01-01 RX ADMIN — DEXAMETHASONE SODIUM PHOSPHATE 4 MG: 4 INJECTION, SOLUTION INTRAMUSCULAR; INTRAVENOUS at 22:38

## 2020-01-01 RX ADMIN — ACETAMINOPHEN 650 MG: 325 TABLET, FILM COATED ORAL at 00:23

## 2020-01-01 RX ADMIN — METOPROLOL SUCCINATE 100 MG: 100 TABLET, EXTENDED RELEASE ORAL at 08:47

## 2020-01-01 RX ADMIN — PRAVASTATIN SODIUM 20 MG: 20 TABLET ORAL at 08:20

## 2020-01-01 RX ADMIN — CARVEDILOL 12.5 MG: 6.25 TABLET, FILM COATED ORAL at 08:11

## 2020-01-01 RX ADMIN — FENTANYL CITRATE 25 MCG: 50 INJECTION INTRAMUSCULAR; INTRAVENOUS at 13:38

## 2020-01-01 RX ADMIN — HYDROCODONE BITARTRATE AND ACETAMINOPHEN 1 TABLET: 7.5; 325 TABLET ORAL at 01:32

## 2020-01-01 RX ADMIN — METOPROLOL TARTRATE 75 MG: 50 TABLET, FILM COATED ORAL at 10:18

## 2020-01-01 RX ADMIN — HYDROCODONE BITARTRATE AND ACETAMINOPHEN 1 TABLET: 7.5; 325 TABLET ORAL at 02:35

## 2020-01-01 RX ADMIN — LISINOPRIL 10 MG: 10 TABLET ORAL at 09:14

## 2020-01-01 RX ADMIN — DEXAMETHASONE SODIUM PHOSPHATE 6 MG: 10 INJECTION INTRAMUSCULAR; INTRAVENOUS at 11:52

## 2020-01-01 RX ADMIN — LISINOPRIL 10 MG: 10 TABLET ORAL at 09:02

## 2020-01-01 RX ADMIN — RIVAROXABAN 20 MG: 20 TABLET, FILM COATED ORAL at 18:01

## 2020-01-01 RX ADMIN — DOCUSATE SODIUM 100 MG: 100 CAPSULE ORAL at 20:44

## 2020-01-01 RX ADMIN — PRAVASTATIN SODIUM 20 MG: 20 TABLET ORAL at 20:05

## 2020-01-01 RX ADMIN — MORPHINE SULFATE 2 MG: 2 INJECTION, SOLUTION INTRAMUSCULAR; INTRAVENOUS at 13:36

## 2020-01-01 RX ADMIN — Medication 1 TABLET: at 08:36

## 2020-01-01 RX ADMIN — Medication 1 TABLET: at 09:18

## 2020-01-01 RX ADMIN — LISINOPRIL 5 MG: 5 TABLET ORAL at 09:18

## 2020-01-01 RX ADMIN — RIVAROXABAN 20 MG: 20 TABLET, FILM COATED ORAL at 17:19

## 2020-01-01 RX ADMIN — POTASSIUM CHLORIDE 40 MEQ: 1.5 POWDER, FOR SOLUTION ORAL at 23:49

## 2020-01-01 RX ADMIN — DEXAMETHASONE SODIUM PHOSPHATE 4 MG: 4 INJECTION, SOLUTION INTRAMUSCULAR; INTRAVENOUS at 10:58

## 2020-01-01 RX ADMIN — LISINOPRIL 10 MG: 10 TABLET ORAL at 08:35

## 2020-01-01 RX ADMIN — SODIUM CHLORIDE, PRESERVATIVE FREE 10 ML: 5 INJECTION INTRAVENOUS at 08:35

## 2020-01-01 RX ADMIN — PRAVASTATIN SODIUM 20 MG: 20 TABLET ORAL at 20:36

## 2020-01-01 RX ADMIN — FUROSEMIDE 40 MG: 40 TABLET ORAL at 08:17

## 2020-01-01 RX ADMIN — FUROSEMIDE 20 MG: 20 TABLET ORAL at 08:47

## 2020-01-01 RX ADMIN — ACETAMINOPHEN 650 MG: 325 TABLET, FILM COATED ORAL at 23:48

## 2020-01-01 RX ADMIN — QUETIAPINE FUMARATE 12.5 MG: 25 TABLET ORAL at 21:36

## 2020-01-01 RX ADMIN — CARVEDILOL 12.5 MG: 6.25 TABLET, FILM COATED ORAL at 18:25

## 2020-01-01 RX ADMIN — HYDROCODONE BITARTRATE AND ACETAMINOPHEN 1 TABLET: 7.5; 325 TABLET ORAL at 00:08

## 2020-01-01 RX ADMIN — SACUBITRIL AND VALSARTAN 1 TABLET: 24; 26 TABLET, FILM COATED ORAL at 21:16

## 2020-01-01 RX ADMIN — METOPROLOL TARTRATE 75 MG: 50 TABLET, FILM COATED ORAL at 00:24

## 2020-01-01 RX ADMIN — CEFAZOLIN SODIUM 2 G: 10 INJECTION, POWDER, FOR SOLUTION INTRAVENOUS at 05:12

## 2020-01-01 RX ADMIN — SODIUM CHLORIDE, PRESERVATIVE FREE 10 ML: 5 INJECTION INTRAVENOUS at 23:22

## 2020-01-01 RX ADMIN — FUROSEMIDE 40 MG: 10 INJECTION, SOLUTION INTRAMUSCULAR; INTRAVENOUS at 09:32

## 2020-01-01 RX ADMIN — SODIUM CHLORIDE, POTASSIUM CHLORIDE, SODIUM LACTATE AND CALCIUM CHLORIDE 100 ML/HR: 600; 310; 30; 20 INJECTION, SOLUTION INTRAVENOUS at 12:01

## 2020-01-01 RX ADMIN — METOPROLOL SUCCINATE 100 MG: 100 TABLET, EXTENDED RELEASE ORAL at 08:15

## 2020-01-01 RX ADMIN — SODIUM CHLORIDE, PRESERVATIVE FREE 10 ML: 5 INJECTION INTRAVENOUS at 09:18

## 2020-01-01 RX ADMIN — FUROSEMIDE 20 MG: 20 TABLET ORAL at 09:22

## 2020-01-01 RX ADMIN — CARVEDILOL 12.5 MG: 6.25 TABLET, FILM COATED ORAL at 08:17

## 2020-01-01 RX ADMIN — DEXAMETHASONE SODIUM PHOSPHATE 6 MG: 10 INJECTION INTRAMUSCULAR; INTRAVENOUS at 06:13

## 2020-01-01 RX ADMIN — SACUBITRIL AND VALSARTAN 1 TABLET: 24; 26 TABLET, FILM COATED ORAL at 20:36

## 2020-01-01 RX ADMIN — PRAVASTATIN SODIUM 20 MG: 20 TABLET ORAL at 20:12

## 2020-01-01 RX ADMIN — DEXAMETHASONE 2 MG: 2 TABLET ORAL at 21:36

## 2020-01-01 RX ADMIN — CEFTRIAXONE SODIUM 1 G: 1 INJECTION, POWDER, FOR SOLUTION INTRAMUSCULAR; INTRAVENOUS at 15:13

## 2020-01-01 RX ADMIN — LISINOPRIL 10 MG: 10 TABLET ORAL at 10:18

## 2020-01-01 RX ADMIN — PRAVASTATIN SODIUM 20 MG: 20 TABLET ORAL at 21:24

## 2020-01-01 RX ADMIN — SODIUM CHLORIDE, PRESERVATIVE FREE 10 ML: 5 INJECTION INTRAVENOUS at 20:40

## 2020-01-01 RX ADMIN — SUFENTANIL CITRATE 10 MCG: 50 INJECTION EPIDURAL; INTRAVENOUS at 08:50

## 2020-01-01 RX ADMIN — CEFTRIAXONE SODIUM 1 G: 1 INJECTION, POWDER, FOR SOLUTION INTRAMUSCULAR; INTRAVENOUS at 15:22

## 2020-01-01 RX ADMIN — SACUBITRIL AND VALSARTAN 1 TABLET: 24; 26 TABLET, FILM COATED ORAL at 08:21

## 2020-01-01 RX ADMIN — ACETAMINOPHEN 650 MG: 325 TABLET, FILM COATED ORAL at 05:55

## 2020-01-01 RX ADMIN — Medication 2 DROP: at 08:57

## 2020-01-01 RX ADMIN — LISINOPRIL 10 MG: 10 TABLET ORAL at 09:39

## 2020-01-01 RX ADMIN — Medication 2 DROP: at 08:05

## 2020-01-01 RX ADMIN — SODIUM CHLORIDE, PRESERVATIVE FREE 10 ML: 5 INJECTION INTRAVENOUS at 20:05

## 2020-01-01 RX ADMIN — POTASSIUM CHLORIDE 20 MEQ: 14.9 INJECTION, SOLUTION INTRAVENOUS at 21:04

## 2020-01-01 RX ADMIN — ONDANSETRON HYDROCHLORIDE 4 MG: 2 SOLUTION INTRAMUSCULAR; INTRAVENOUS at 10:52

## 2020-01-01 RX ADMIN — PRAVASTATIN SODIUM 20 MG: 20 TABLET ORAL at 21:45

## 2020-01-01 RX ADMIN — PRAVASTATIN SODIUM 20 MG: 20 TABLET ORAL at 20:23

## 2020-01-01 RX ADMIN — SODIUM CHLORIDE, PRESERVATIVE FREE 10 ML: 5 INJECTION INTRAVENOUS at 08:23

## 2020-01-01 RX ADMIN — BISACODYL 10 MG: 10 SUPPOSITORY RECTAL at 08:59

## 2020-01-01 RX ADMIN — SACUBITRIL AND VALSARTAN 1 TABLET: 24; 26 TABLET, FILM COATED ORAL at 21:20

## 2020-01-01 RX ADMIN — METOPROLOL SUCCINATE 100 MG: 100 TABLET, EXTENDED RELEASE ORAL at 08:05

## 2020-01-01 RX ADMIN — DEXAMETHASONE SODIUM PHOSPHATE 6 MG: 10 INJECTION INTRAMUSCULAR; INTRAVENOUS at 05:24

## 2020-01-01 RX ADMIN — DEXAMETHASONE SODIUM PHOSPHATE 6 MG: 10 INJECTION INTRAMUSCULAR; INTRAVENOUS at 00:40

## 2020-01-01 RX ADMIN — RIVAROXABAN 20 MG: 20 TABLET, FILM COATED ORAL at 17:50

## 2020-01-01 RX ADMIN — CARVEDILOL 12.5 MG: 6.25 TABLET, FILM COATED ORAL at 17:11

## 2020-01-01 RX ADMIN — SODIUM CHLORIDE 125 ML/HR: 9 INJECTION, SOLUTION INTRAVENOUS at 21:33

## 2020-01-01 RX ADMIN — DEXAMETHASONE SODIUM PHOSPHATE 6 MG: 10 INJECTION INTRAMUSCULAR; INTRAVENOUS at 12:25

## 2020-01-01 RX ADMIN — RIVAROXABAN 20 MG: 20 TABLET, FILM COATED ORAL at 18:25

## 2020-01-01 RX ADMIN — ACETAMINOPHEN 650 MG: 325 TABLET, FILM COATED ORAL at 21:50

## 2020-01-01 RX ADMIN — Medication 1 TABLET: at 08:41

## 2020-01-01 RX ADMIN — OXYCODONE HYDROCHLORIDE 5 MG: 5 TABLET ORAL at 09:22

## 2020-01-01 RX ADMIN — SODIUM CHLORIDE, PRESERVATIVE FREE 10 ML: 5 INJECTION INTRAVENOUS at 09:02

## 2020-01-01 RX ADMIN — PRAVASTATIN SODIUM 20 MG: 20 TABLET ORAL at 08:37

## 2020-01-01 RX ADMIN — TECHNETIUM TC 99M SESTAMIBI 1 DOSE: 1 INJECTION INTRAVENOUS at 11:00

## 2020-01-01 RX ADMIN — RIVAROXABAN 20 MG: 20 TABLET, FILM COATED ORAL at 17:44

## 2020-01-01 RX ADMIN — DEXAMETHASONE 4 MG: 4 TABLET ORAL at 20:09

## 2020-01-01 RX ADMIN — PAROXETINE 20 MG: 20 TABLET, FILM COATED ORAL at 08:41

## 2020-01-01 RX ADMIN — Medication 2 DROP: at 09:12

## 2020-01-01 RX ADMIN — METOPROLOL SUCCINATE 100 MG: 100 TABLET, EXTENDED RELEASE ORAL at 09:22

## 2020-01-01 RX ADMIN — FUROSEMIDE 20 MG: 20 TABLET ORAL at 08:35

## 2020-01-01 RX ADMIN — SODIUM CHLORIDE, PRESERVATIVE FREE 10 ML: 5 INJECTION INTRAVENOUS at 08:52

## 2020-01-01 RX ADMIN — DEXAMETHASONE SODIUM PHOSPHATE 6 MG: 10 INJECTION INTRAMUSCULAR; INTRAVENOUS at 23:02

## 2020-01-01 RX ADMIN — FUROSEMIDE 20 MG: 20 TABLET ORAL at 09:31

## 2020-01-01 RX ADMIN — SODIUM CHLORIDE, POTASSIUM CHLORIDE, SODIUM LACTATE AND CALCIUM CHLORIDE 75 ML/HR: 600; 310; 30; 20 INJECTION, SOLUTION INTRAVENOUS at 19:32

## 2020-01-01 RX ADMIN — FUROSEMIDE 20 MG: 20 TABLET ORAL at 09:24

## 2020-01-01 RX ADMIN — SODIUM CHLORIDE, PRESERVATIVE FREE 10 ML: 5 INJECTION INTRAVENOUS at 08:11

## 2020-01-01 RX ADMIN — HYDROCODONE BITARTRATE AND ACETAMINOPHEN 1 TABLET: 7.5; 325 TABLET ORAL at 00:21

## 2020-01-01 RX ADMIN — ACETAMINOPHEN 650 MG: 325 TABLET, FILM COATED ORAL at 09:31

## 2020-01-01 RX ADMIN — FUROSEMIDE 40 MG: 40 TABLET ORAL at 14:39

## 2020-01-01 RX ADMIN — PROPOFOL 100 MG: 10 INJECTION, EMULSION INTRAVENOUS at 07:12

## 2020-01-01 RX ADMIN — DOCUSATE SODIUM 100 MG: 100 CAPSULE ORAL at 08:41

## 2020-01-01 RX ADMIN — METOPROLOL SUCCINATE 100 MG: 100 TABLET, EXTENDED RELEASE ORAL at 08:41

## 2020-01-07 NOTE — PROGRESS NOTES
Single Chamber Ventricular Pacemaker Evaluation Report  IN OFFICE INTERROGATION    January 7, 2020    Primary Cardiologist: Mike  : Guidant Model: Advantio [id number]  Implant date: 11/5/2014    Reason for evaluation: routine  Indication for pacemaker: a-fib and bradycardia    Measurements  Ventricular sensing - R wave: 24.2 mV  Ventricular threshold: 1 V @ 0.4 ms  Ventricular lead impedance: 678 ohms    Diagnostic Data  Paced: 98 %    Episodes recorded since 7/3/2019:  7 ventricular high rate episodes:  Rates range from 137-164 bpm, duration up to 10 seconds.     Battery status: satisfactory    Estimated 5.5 years remaining     Final Parameters  Mode: VVIR     Lower rate: 70 bpm    Ventricular - Amplitude: 1.5 V Pulse width: 0.4 ms Sensitivity: 2.5 mV   Changes made: No changes made.  Conclusions: normal pacemaker function, stable pacing and sensing thresholds and adequate battery reserve    Follow up: 6 months in office

## 2020-07-01 PROBLEM — Z95.0 CARDIAC PACEMAKER: Status: ACTIVE | Noted: 2020-01-01

## 2020-07-01 NOTE — PROGRESS NOTES
"Subjective    Donnell Cain is a 87 y.o. male. FU of ihd, risks, chf and rhythm    History of Present Illness     IHD:  Relates that he is active and \"feel great\". No cp or decline in stamina. Compliant with meds. EGK is 100% v-paced and is nsc.     AFIB WITH PACER:  Pacer interrogations show almost 100% v-pacing. He has no palpitations and is on a DOAC without any known bleeding problems.    HTN:  Relates good checks on current meds and no light-headedness.    HLD:  Is on a low-potent statin and his pcp checks his lipids    CHF (S+D):  Has no decline in stamina over the past year and remains active for his age. Has no edema and no unusual sob. Appetite is good and bowel and bladder function is stable      The following portions of the patient's history were reviewed and updated as appropriate: allergies, current medications, past family history, past medical history, past social history, past surgical history and problem list.    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)   • SSS (sick sinus syndrome) (CMS/HCC)   • Permanent atrial fibrillation (CMS/HCC)   • HLD (hyperlipidemia)   • HTN (hypertension)   • Murmur   • Cardiac pacemaker       Allergies   Allergen Reactions   • Barbiturates    • Crestor [Rosuvastatin Calcium]    • Lipitor [Atorvastatin]        Family History   Problem Relation Age of Onset   • Heart disease Brother        Social History     Socioeconomic History   • Marital status:      Spouse name: Not on file   • Number of children: Not on file   • Years of education: Not on file   • Highest education level: Not on file   Tobacco Use   • Smoking status: Former Smoker     Types: Cigarettes     Last attempt to quit: 1971     Years since quittin.5   • Smokeless tobacco: Never Used   • Tobacco comment: Quit smoking age 40   Substance and Sexual Activity   • Alcohol use: No   • Drug use: No " "  • Sexual activity: Defer         Current Outpatient Medications:   •  acetaminophen (TYLENOL) 325 MG tablet, Take 650 mg by mouth Every 6 (Six) Hours As Needed for mild pain (1-3)., Disp: , Rfl:   •  coenzyme Q10 100 MG capsule, Take 100 mg by mouth Daily., Disp: , Rfl:   •  furosemide (LASIX) 40 MG tablet, Take 10 mg by mouth Daily., Disp: , Rfl:   •  lisinopril (PRINIVIL,ZESTRIL) 10 MG tablet, TAKE 1 TABLET DAILY, Disp: 90 tablet, Rfl: 4  •  metoprolol tartrate (LOPRESSOR) 50 MG tablet, TAKE 1.5 TABLETS BY MOUTH TWICE A DAY (Patient taking differently: 75 mg.), Disp: 270 tablet, Rfl: 3  •  Multiple Vitamins-Minerals (MULTIVITAMIN ADULT PO), Take  by mouth., Disp: , Rfl:   •  pravastatin (PRAVACHOL) 20 MG tablet, Take 1 tablet by mouth Daily., Disp: 90 tablet, Rfl: 11  •  XARELTO 20 MG tablet, TAKE 1 TABLET DAILY WITH DINNER, Disp: 90 tablet, Rfl: 4  •  furosemide (LASIX) 20 MG tablet, TAKE 1 TABLET DAILY, Disp: 90 tablet, Rfl: 4  •  Metoprolol Tartrate 75 MG tablet, Take 75 mg by mouth 2 (Two) Times a Day., Disp: , Rfl:   •  Metoprolol Tartrate 75 MG tablet, Take 75 mg by mouth 2 (Two) Times a Day., Disp: 180 tablet, Rfl: 3    Past Surgical History:   Procedure Laterality Date   • APPENDECTOMY     • CARDIAC PACEMAKER PLACEMENT     • CORONARY ARTERY BYPASS GRAFT     • PACEMAKER IMPLANTATION      x 3    • TONSILLECTOMY         Review of Systems   Constitutional: Negative for fatigue and fever.   Respiratory: Negative for apnea, chest tightness and shortness of breath.    Cardiovascular: Negative for chest pain, palpitations and leg swelling.   Gastrointestinal: Negative for abdominal pain.   Genitourinary: Negative for dysuria.   Musculoskeletal: Negative for myalgias.   Neurological: Negative for weakness and light-headedness.   Psychiatric/Behavioral: Negative for sleep disturbance.       /71   Pulse 70   Ht 188 cm (74\")   Wt 88.5 kg (195 lb)   BMI 25.04 kg/m²   Procedures    Objective   Physical Exam "   Constitutional: He is oriented to person, place, and time. He appears well-developed and well-nourished.   Elderly and mildly frail   HENT:   Head: Normocephalic.   Eyes: Pupils are equal, round, and reactive to light.   Cardiovascular: Normal rate and regular rhythm. Exam reveals decreased pulses. Exam reveals no gallop and no friction rub.   Murmur heard.   Diastolic murmur is present with a grade of 1/6.  ursb   Pulmonary/Chest: Effort normal and breath sounds normal. No stridor. No respiratory distress. He has no wheezes. He has no rales.   Abdominal: Soft. Bowel sounds are normal. He exhibits no distension and no mass. There is no tenderness. There is no guarding.   Musculoskeletal: He exhibits no edema, tenderness or deformity.   Neurological: He is alert and oriented to person, place, and time.   Skin: Skin is warm and dry. He is not diaphoretic.   Psychiatric: He has a normal mood and affect.       Assessment/Plan   Donnell was seen today for atrial fibrillation, coronary artery disease and congestive heart failure.    Diagnoses and all orders for this visit:    Coronary artery disease involving native coronary artery of native heart without angina pectoris  Comments:  no angina  Orders:  -     ECG 12 Lead    Chronic combined systolic and diastolic congestive heart failure (CMS/HCC)  Comments:  appears euvolemic    Permanent atrial fibrillation (CMS/HCC)  Comments:  well tolerated    Other hyperlipidemia  Comments:  on low-potent statin    Essential hypertension  Comments:  well controlled    Cardiac pacemaker  Comments:  interrogates well and near 100%  appropriately                 Return in about 1 year (around 7/1/2021) for Next scheduled follow up.  Orders Placed This Encounter   Procedures   • ECG 12 Lead     Order Specific Question:   Reason for Exam:     Answer:   [email or web address]

## 2020-07-21 PROBLEM — E87.1 HYPONATREMIA: Status: ACTIVE | Noted: 2020-01-01

## 2020-07-23 PROBLEM — R53.1 WEAKNESS: Status: ACTIVE | Noted: 2020-01-01

## 2020-08-08 NOTE — ED PROVIDER NOTES
"Subjective   Patient is a 87-year-old white male presents emergency department per EMS after falling at home just prior to arrival.  His family states that he was just discharged from [facility] today after being there for 3 weeks for rehabilitation from congestive heart failure and hyponatremia.  Family states that they had just left him about 20 minutes prior to go to the grocery for him and received a call that the patient had fallen and struck his head on a table in the home.  Patient states that he had gotten up to get his phone and became weak and fell backwards hitting his head on a table.  He denies any loss of consciousness.  He did sustain about a half a centimeter laceration posterior scalp.  He denies any chest pain or shortness of breath before the fall.  He denies syncope.  He states that he was feeling very weak\" my legs went out from underneath me and I fell.  He also complains of nasal congestion and greenish sputum.  He states he does have a history of sinusitis.  He states he was just tested for COVID prior to being discharged from [facility] and the results were negative.      History provided by:  Patient and relative   used: No        Review of Systems   Constitutional: Negative.    HENT:        Patient is a 87-year-old white male presents emergency department per EMS after falling at home just prior to arrival.  His family states that he was just discharged from [facility] today after being there for 3 weeks for rehabilitation from congestive heart failure and hyponatremia.  Family states that they had just left him about 20 minutes prior to go to the grocery for him and received a call that the patient had fallen and struck his head on a table in the home.  Patient states that he had gotten up to get his phone and became weak and fell backwards hitting his head on a table.  He denies any loss of consciousness.  He did sustain about a half a centimeter " "laceration posterior scalp.  He denies any chest pain or shortness of breath before the fall.  He denies syncope.  He states that he was feeling very weak\" my legs went out from underneath me and I fell.  He also complains of nasal congestion and greenish sputum.  He states he does have a history of sinusitis.  He states he was just tested for COVID prior to being discharged from [facility] and the results were negative.     Eyes: Negative.    Respiratory: Negative.    Cardiovascular: Negative.    Gastrointestinal: Negative.    Endocrine: Negative.    Genitourinary: Negative.    Musculoskeletal: Negative.    Skin: Negative.    Allergic/Immunologic: Negative.    Neurological: Negative.    Hematological: Negative.    Psychiatric/Behavioral: Negative.    All other systems reviewed and are negative.      Past Medical History:   Diagnosis Date   • A-fib (CMS/[facility])    • Atherosclerosis of coronary artery bypass graft    • Atrial fibrillation (CMS/[facility])    • CAD (coronary artery disease)    • Cardiac pacemaker    • CHF (congestive heart failure) (CMS/[facility])    • Congestive heart failure (CMS/[facility])    • HLD (hyperlipidemia)    • HTN (hypertension)    • Ischemic cardiomyopathy    • MI, old    • Myocardial infarction (CMS/[facility])    • SSS (sick sinus syndrome) (CMS/[facility])        Allergies   Allergen Reactions   • Barbiturates    • Crestor [Rosuvastatin Calcium]    • Lipitor [Atorvastatin]        Past Surgical History:   Procedure Laterality Date   • APPENDECTOMY     • CARDIAC PACEMAKER PLACEMENT     • CORONARY ARTERY BYPASS GRAFT     • PACEMAKER IMPLANTATION      x 3    • TONSILLECTOMY         Family History   Problem Relation Age of Onset   • Heart disease Brother        Social History     Socioeconomic History   • Marital status:      Spouse name: Not on file   • Number of children: Not on file   • Years of education: Not on file   • Highest education level: Not on file   Tobacco Use   • Smoking status: Former Smoker     Types: " "Cigarettes     Last attempt to quit: 1971     Years since quittin.6   • Smokeless tobacco: Never Used   • Tobacco comment: Quit smoking age 40   Substance and Sexual Activity   • Alcohol use: No   • Drug use: No   • Sexual activity: Defer       Prior to Admission medications    Medication Sig Start Date End Date Taking? Authorizing Provider   acetaminophen (TYLENOL) 325 MG tablet Take 650 mg by mouth Every 6 (Six) Hours As Needed for mild pain (1-3).    ProviderVazquez MD   carvedilol (COREG) 12.5 MG tablet Take 1 tablet by mouth 2 (Two) Times a Day With Meals. 20   Cassidy Song APRN   coenzyme Q10 100 MG capsule Take 100 mg by mouth Daily.    ProviderVazquez MD   diclofenac (VOLTAREN) 1 % gel gel Apply 2 g topically to the appropriate area as directed 4 (Four) Times a Day As Needed (Pain). 20   Csasidy Song APRN   furosemide (LASIX) 40 MG tablet Take 1 tablet by mouth Daily. 20   Cassidy Song APRN   Multiple Vitamins-Minerals (MULTIVITAMIN ADULT PO) Take  by mouth.    ProviderVazquez MD   pravastatin (PRAVACHOL) 20 MG tablet Take 1 tablet by mouth Daily. 7/3/19   Tramaine Mckeon MD   sacubitril-valsartan (ENTRESTO) 24-26 MG tablet Take 1 tablet by mouth Every 12 (Twelve) Hours. 20   Cassidy Song APRN   XARELTO 20 MG tablet TAKE 1 TABLET DAILY WITH DINNER 19   Tramaine Mckeon MD       /65   Pulse 86   Temp 98 °F (36.7 °C) (Oral)   Resp 18   Ht 188 cm (74\")   Wt 75.3 kg (166 lb 0.1 oz)   SpO2 99%   BMI 21.31 kg/m²     Objective   Physical Exam   Constitutional: He is oriented to person, place, and time. He appears well-developed and well-nourished.   No acute distress.  Nontoxic-appearing   HENT:   Head: Normocephalic and atraumatic.   He has approximately a 0.5 cm superficial laceration to the posterior scalp.  Bleeding is controlled.   Eyes: Pupils are equal, round, and reactive to light. Conjunctivae and EOM " are normal.   Neck: Normal range of motion. Neck supple.   Neck is supple.  He does complain of mild tenderness to the posterior occipital area.   are strong and equal.  He moves all extremities well.  Patient is placed in c-collar immobilization.   Cardiovascular: Normal rate, normal heart sounds and intact distal pulses.   Rhythm is regular irregular A. fib at a rate of 68.   Pulmonary/Chest: Effort normal and breath sounds normal.   Abdominal: Soft. Bowel sounds are normal.   Musculoskeletal: Normal range of motion.   Neurological: He is alert and oriented to person, place, and time. He has normal reflexes.   Skin: Skin is warm and dry.   Mild pallor noted   Psychiatric: He has a normal mood and affect. His behavior is normal. Judgment and thought content normal.   Nursing note and vitals reviewed.      ECG 12 Lead    Date/Time: 8/8/2020 5:21 PM  Performed by: Janis Juarez APRN  Authorized by: Janis Juarez APRN                  Lab Results (last 24 hours)     Procedure Component Value Units Date/Time    Urinalysis With Culture If Indicated - Urine, Clean Catch [[id number]]  (Abnormal) Collected:  08/08/20 1610    Specimen:  Urine, Clean Catch Updated:  08/08/20 1640     Color, UA Dark Yellow     Appearance, UA Clear     pH, UA 6.5     Specific Gravity, UA 1.011     Glucose, UA Negative     Ketones, UA Trace     Bilirubin, UA Negative     Blood, UA Negative     Protein, UA Negative     Leuk Esterase, UA Negative     Nitrite, UA Negative     Urobilinogen, UA 2.0 E.U./dL    Narrative:       Urine microscopic not indicated.    CBC & Differential [[id number]] Collected:  08/08/20 1621    Specimen:  Blood Updated:  08/08/20 1642    Narrative:       The following orders were created for panel order CBC & Differential.  Procedure                               Abnormality         Status                     ---------                               -----------         ------                     CBC  Auto Differential[[id number]]        Abnormal            Final result                 Please view results for these tests on the individual orders.    Comprehensive Metabolic Panel [[id number]]  (Abnormal) Collected:  08/08/20 1621    Specimen:  Blood Updated:  08/08/20 1656     Glucose 111 mg/dL      BUN 14 mg/dL      Creatinine 0.75 mg/dL      Sodium 131 mmol/L      Potassium 4.6 mmol/L      Chloride 95 mmol/L      CO2 23.0 mmol/L      Calcium 8.9 mg/dL      Total Protein 6.8 g/dL      Albumin 3.30 g/dL      ALT (SGPT) 28 U/L      AST (SGOT) 30 U/L      Alkaline Phosphatase 98 U/L      Total Bilirubin 2.1 mg/dL      eGFR Non African Amer 99 mL/min/1.73      Globulin 3.5 gm/dL      A/G Ratio 0.9 g/dL      BUN/Creatinine Ratio 18.7     Anion Gap 13.0 mmol/L     Narrative:       GFR Normal >60  Chronic Kidney Disease <60  Kidney Failure <15      Protime-INR [[id number]]  (Abnormal) Collected:  08/08/20 1621    Specimen:  Blood Updated:  08/08/20 1646     Protime 25.7 Seconds      INR 2.36    BNP [[id number]]  (Abnormal) Collected:  08/08/20 1621    Specimen:  Blood Updated:  08/08/20 1652     proBNP 5,050.0 pg/mL     Narrative:       Among patients with dyspnea, NT-proBNP is highly sensitive for the detection of acute congestive heart failure. In addition NT-proBNP of <300 pg/ml effectively rules out acute congestive heart failure with 99% negative predictive value.    Results may be falsely decreased if patient taking Biotin.      Troponin [[id number]]  (Normal) Collected:  08/08/20 1621    Specimen:  Blood Updated:  08/08/20 1652     Troponin T 0.013 ng/mL     Narrative:       Troponin T Reference Range:  <= 0.03 ng/mL-   Negative for AMI  >0.03 ng/mL-     Abnormal for myocardial necrosis.  Clinicians would have to utilize clinical acumen, EKG, Troponin and serial changes to determine if it is an Acute Myocardial Infarction or myocardial injury due to an underlying chronic condition.       Results may be falsely  decreased if patient taking Biotin.      CBC Auto Differential [[id number]]  (Abnormal) Collected:  08/08/20 1621    Specimen:  Blood Updated:  08/08/20 1642     WBC 9.50 10*3/mm3      RBC 4.53 10*6/mm3      Hemoglobin 13.0 g/dL      Hematocrit 37.8 %      MCV 83.4 fL      MCH 28.7 pg      MCHC 34.4 g/dL      RDW 14.1 %      RDW-SD 42.0 fl      MPV 9.3 fL      Platelets 313 10*3/mm3      Neutrophil % 77.5 %      Lymphocyte % 6.9 %      Monocyte % 14.2 %      Eosinophil % 0.5 %      Basophil % 0.6 %      Immature Grans % 0.3 %      Neutrophils, Absolute 7.35 10*3/mm3      Lymphocytes, Absolute 0.66 10*3/mm3      Monocytes, Absolute 1.35 10*3/mm3      Eosinophils, Absolute 0.05 10*3/mm3      Basophils, Absolute 0.06 10*3/mm3      Immature Grans, Absolute 0.03 10*3/mm3      nRBC 0.0 /100 WBC           CT Head Without Contrast   ED Interpretation   1. No hemorrhage, edema or mass effect.   2. Minimal age-appropriate atrophy.       The full report of this exam was immediately signed and available to the   emergency room. The patient is currently in the emergency room.       This report was finalized on 08/08/2020 15:42 by Dr. Curt Brooks MD.      Final Result   1. No hemorrhage, edema or mass effect.   2. Minimal age-appropriate atrophy.       The full report of this exam was immediately signed and available to the   emergency room. The patient is currently in the emergency room.       This report was finalized on 08/08/2020 15:42 by Dr. Curt Brooks MD.      CT Cervical Spine Without Contrast   ED Interpretation   1. No evidence of cervical spine fracture.   2. Degenerative changes of the cervical spine, as described.   3. Atheromatous calcification of the carotid arteries in the neck   bilaterally. There is vertebral artery calcification at the skull base.       The full report of this exam was immediately signed and available to the   emergency room. The patient is currently in the emergency room.       This  report was finalized on 08/08/2020 15:49 by Dr. Curt Brooks MD.      Final Result   1. No evidence of cervical spine fracture.   2. Degenerative changes of the cervical spine, as described.   3. Atheromatous calcification of the carotid arteries in the neck   bilaterally. There is vertebral artery calcification at the skull base.       The full report of this exam was immediately signed and available to the   emergency room. The patient is currently in the emergency room.       This report was finalized on 08/08/2020 15:49 by Dr. Crut Brooks MD.      XR Chest 1 View   ED Interpretation   1. Mild linear atelectasis or scarring in the lung bases.   2. Borderline cardiomegaly. Prior heart bypass surgery.           This report was finalized on 08/08/2020 15:16 by Dr. Curt Brooks MD.      Final Result   1. Mild linear atelectasis or scarring in the lung bases.   2. Borderline cardiomegaly. Prior heart bypass surgery.           This report was finalized on 08/08/2020 15:16 by Dr. Curt Brooks MD.          ED Course  ED Course as of Aug 08 1828   Sat Aug 08, 2020   1715 Reviewed pt and pt care plan with dr treadwell- also in agreement with pt care plan. Reviewed test results with pt and pt family. The laceration to the scalp is very superficial and does not need repair. Pt has been updated on tetanus. Pt feels comfortable going home and family will be staying with him tonight. Pt is requesting medications for sinusitis and allergic rhinitis. Pt will be discharged home soon in stable condition     [CW]      ED Course User Index  [CW] Janis Juarez APRN          MDM  Number of Diagnoses or Management Options  Allergic rhinitis, unspecified seasonality, unspecified trigger: minor  Contusion of scalp, initial encounter: minor  Fall, initial encounter: minor  Longstanding persistent atrial fibrillation (CMS/HCC): minor  Sinusitis, unspecified chronicity, unspecified location: minor     Amount and/or Complexity  of Data Reviewed  Clinical lab tests: ordered and reviewed  Tests in the radiology section of CPT®: ordered and reviewed  Independent visualization of images, tracings, or specimens: yes    Patient Progress  Patient progress: stable      Final diagnoses:   Fall, initial encounter   Contusion of scalp, initial encounter   Sinusitis, unspecified chronicity, unspecified location   Allergic rhinitis, unspecified seasonality, unspecified trigger   Longstanding persistent atrial fibrillation (CMS/[facility])          Janis Juarez, APRN  08/08/20 8886

## 2020-08-10 PROBLEM — Z79.01 CURRENT USE OF LONG TERM ANTICOAGULATION: Status: ACTIVE | Noted: 2020-01-01

## 2020-08-12 NOTE — OUTREACH NOTE
"Patient Outreach Note    PING notification of [facility] patient discharged from [facility] ED 8-8-2020 after a fall at home. He had been discharged from the SNF earlier that day. Per patient, he became dizzy after he got up and \"I've never fallen that hard before\". He was experiencing nasal congestion and a cough at the SNF. He was prescribed Zyrtec, Flonase, and Augmentin in ED;he has filled and taking as directed. He indicated he has all other medications in the home. [facility] discussed the need for home health services and he declined. He has a walker for stability. [employer] members are bringing meals nightly. He has just received a life alert in the mail today and friends will help install the device. Provided patient with [facility] contact information and he will call for needs. He declined any further [facility] outreach.     Kimberly Newby RN  Ambulatory     8/12/2020, 15:34      "

## 2020-08-12 NOTE — OUTREACH NOTE
Care Coordination Assessment    Documented/Reviewed By:  Kimberly Newby RN Date/time:  8/12/2020  3:33 PM   Assessment completed with:  patient  Enrolled in care management program:  No  Living arrangement:  alone  Support system:  friends, leyla based, family  Type of residence:  private residence  Home care services:  No  Equipment used at home:  walker  Communication device:  Yes  Bed or wheelchair confined:  No  Inadequate nutrition:  No  Medication adherence problem:  No  Experiencing side effects from current medications:  No  History of fall(s) in last 6 months:  Yes  Difficulty keeping appointments:  No

## 2020-09-01 NOTE — PROGRESS NOTES
Subjective:     Encounter Date:09/02/2020      Patient ID: Donnell Cain is a 87 y.o. male   TELEPHONE VISIT    Chief Complaint:  History of Present Illness  Patient presents today for follow up after recent hospital admission. Patient was admitted in July for profound dizziness and weakness. He also noted some leg swelling at that time. Patient was thought to be volume overloaded with a significant drop in his LVEF from 45% to 24%. He was found to have severe hyponatremia- that was treated with IV diuretics, Samsca and fluid retention. He was offered a LifeVest but patient refused. Patient was started on Coreg 12.5, Entresto and Lasix was increased. He was sent to [facility] for rehab. Patient has now been discharged home. He states his swelling is completely gone. He does not comply with low salt diet. He does not weigh daily. He is no longer taking coreg or entresto and has lasix is back down to 20. He states that he was unaware he had been started on these medications. Patient has a known history of permanent atrial fibrillation, chronic anticoagulation with Eliquis, coronary artery disease, arthritis, combined congestive heart failure with last known EF of 45%, Pacemaker, Hyperlipidemia, Hypertension, and sick sinus syndrome.     The following portions of the patient's history were reviewed and updated as appropriate: allergies, current medications, past medical history, past social history, past and problem list.    No Known Allergies    Current Outpatient Medications:   •  coenzyme Q10 100 MG capsule, Take 100 mg by mouth Daily., Disp: , Rfl:   •  diclofenac (VOLTAREN) 1 % gel gel, Apply 2 g topically to the appropriate area as directed 4 (Four) Times a Day As Needed (Pain)., Disp: , Rfl:   •  furosemide (LASIX) 20 MG tablet, Take 1 tablet by mouth Daily., Disp: , Rfl:   •  lisinopril (PRINIVIL,ZESTRIL) 10 MG tablet, Take 1 tablet by mouth Daily., Disp: , Rfl:   •  Multiple Vitamins-Minerals (MULTIVITAMIN  "ADULT PO), Take  by mouth., Disp: , Rfl:   •  pravastatin (PRAVACHOL) 20 MG tablet, Take 1 tablet by mouth Daily., Disp: 90 tablet, Rfl: 11  •  XARELTO 20 MG tablet, TAKE 1 TABLET DAILY WITH DINNER, Disp: 90 tablet, Rfl: 4    Social History     Socioeconomic History   • Marital status:      Spouse name: Not on file   • Number of children: Not on file   • Years of education: Not on file   • Highest education level: Not on file   Tobacco Use   • Smoking status: Former Smoker     Types: Cigarettes     Last attempt to quit: 1971     Years since quittin.7   • Smokeless tobacco: Never Used   • Tobacco comment: Quit smoking age 40   Substance and Sexual Activity   • Alcohol use: No   • Drug use: No   • Sexual activity: Defer       Review of Systems   Constitution: Negative for chills, decreased appetite, fever, malaise/fatigue, weight gain and weight loss.   HENT: Negative for nosebleeds.    Eyes: Negative for visual disturbance.   Cardiovascular: Negative for chest pain, dyspnea on exertion, leg swelling, near-syncope, orthopnea, palpitations, paroxysmal nocturnal dyspnea and syncope.   Respiratory: Positive for shortness of breath. Negative for cough, hemoptysis and snoring.    Endocrine: Negative for cold intolerance and heat intolerance.   Hematologic/Lymphatic: Negative for bleeding problem. Does not bruise/bleed easily.   Skin: Negative for rash.   Musculoskeletal: Negative for back pain and falls.   Gastrointestinal: Negative for abdominal pain, constipation, diarrhea, heartburn, melena, nausea and vomiting.   Genitourinary: Negative for hematuria.   Neurological: Negative for dizziness, headaches and light-headedness.   Psychiatric/Behavioral: Negative for altered mental status.   Allergic/Immunologic: Negative for persistent infections.              Objective:     Physical Exam  Not performed due to telephone visit  Procedures  /56   Pulse 76   Ht 188 cm (74\")   Wt 82.6 kg (182 lb)   BMI " 23.37 kg/m²   Lab Review:   I have reviewed       Lab Results   Component Value Date    CHOL 99 07/28/2020    TRIG 61 07/28/2020    HDL 34 (L) 07/28/2020    LDL 53 07/28/2020      Results for orders placed during the hospital encounter of 07/21/20   Adult Transthoracic Echo Complete W/ Cont if Necessary Per Protocol    Narrative · Estimated EF = 24%.  · Left ventricular systolic function is severely decreased.  · The left ventricular cavity is mildly dilated.  · Mild mitral valve regurgitation is present  · Mild tricuspid valve regurgitation is present.  · Right ventricular cavity is mildly dilated.  · Mildly reduced right ventricular systolic function noted.  · Abnormal global Longitudinal LV strain = -4.3%. Strain data was reviewed   and speckle tracking was considered accurate.  · Moderate pulmonary hypertension is present.         Assessment:          Diagnosis Plan   1. Chronic combined systolic and diastolic congestive heart failure (CMS/HCC)  Basic Metabolic Panel   2. Coronary artery disease involving native coronary artery of native heart without angina pectoris     3. Permanent atrial fibrillation (CMS/HCC)     4. SSS (sick sinus syndrome) (CMS/HCC)     5. Cardiac pacemaker     6. Essential hypertension     7. Other hyperlipidemia            Plan:       1. Chronic combined congestive heart failure - He reports he is now euvolemic. No recent blood work. Not on Entresto or Coreg. BP has been running low. Will switch lopressor to Toprol. Could consider transitioning Lisinopril to Entresto in future but with BP low I do not think he will tolerate. Discussed low salt diet and daily weights. I also strongly encouraged follow up with PCP.   2. Coronary Artery Disease - No ischemia on stress. On Xarelto. On statin and LDL at goal. No angina  3. Permanent Atrial Fibrillation - Rates controlled. On Xarelto  4/5. SSS - Pacemaker - stable  6. Hypertension - borderline low. On Lisinopril and metoprolol  7.  Hyperlipidemia- LDL at goal on cholesterol [facility]    You have chosen to receive care through a telephone visit. Do you consent to use a telephone visit for your medical care today? Yes  This visit has been rescheduled as a phone visit to comply with patient safety concerns in accordance with CDC recommendations. Total time of discussion was 25 minutes.    Follow up in 1 month.    Extensive review of medication with patient. Encouraged him to bring medication to follow up. Blood work prior to next follow up.

## 2020-10-08 NOTE — DISCHARGE INSTRUCTIONS
It is very important that you continue to follow-up closely with the orthopedic [facility] to discuss the need for repeat imaging to assure that your right hip fracture is healing well.  Please continue to walk as well as do physical therapy exercises based on their recommendations.  It is not uncommon to have pain with broken bones and it is important that you have close communication with the orthopedic [facility] or your primary care provider for further long-term help with pain management.  Please return to the ED should you have any changes in pain, new injuries, or any other concerns.

## 2020-10-08 NOTE — ED PROVIDER NOTES
"Subjective   History of Present Illness    Patient is an 87-year-old male presenting to ED with hip pain.  Patient reported a few months ago he was having trouble with falls and noted that he fell directly onto his right hip sustaining a fracture.  Patient reported he spent 2 weeks in the hospital, 2 weeks at [facility], and since has been doing physical therapy at his home and following up with [facility].  Patient reported approximately 9 days ago he had CT imaging which showed the fracture was still there however he has been advised that he can walk and weight-bear.  Patient reported he had to borrow a friend's \"3 wheel walker\" to move throughout his house.  Patient denies any new falls or injuries but reported that the pain in the area is continuing to get worse.  Patient reported he is now having problems standing up due to pain in the right hip.  Patient reports it will sometimes very slightly radiate towards the back of his right hip however never goes up into his spine, wraps around his pelvis, or down his right lower extremity.  Patient denies any weakness or sensation changes of the right lower extremity.  Patient denies any other pain or injuries.  Patient reported that his next follow-up appoint with the [facility] is in 2 weeks however he is requesting \"something that will help with my pain but not make me dizzy so I do not fall again.\"  Patient reported he has not been advised to take any medications including over-the-counter, anti-inflammatories, prescribed, or pain medications at this point.  Patient denies any other recent illnesses, known sick contact, or injuries.  Patient reported he has been using a topical cream he found at [facility] but denies any other medication use prior to arrival.    Patient did note that he has swelling to his bilateral lower extremities but he reported this is at its baseline.  Patient reported about this time a day he will have swelling and when he goes " home and elevates them it will resolve.  Patient denies any unilateral swelling or changes to his baseline.    Patient has no known medication allergies.  Patient takes daily coenzyme every 10, Lasix, lisinopril, multivitamins, Xarelto, pravastatin, metoprolol.  Patient has a medical history positive for CAD, A. fib, history of MI, CHF, hyperlipidemia, ischemic cardiomyopathy, sick sinus syndrome, hypertension, A. fib, CHF.  Patient is a surgical history positive for pacemaker implantation, appendectomy, CABG, tonsillectomy.  Patient is a former cigarette smoker with no current use of tobacco products, alcohol, marijuana, or any other IV/recreational/illicit drugs.    Records reviewed show patient had a CT of the right lower extremity on 9/29/2020 which showed: Small nondisplaced acute appearing fracture through the lateral aspect of the right femur greater trochanter.    Review of Systems   Constitutional: Negative.    HENT: Negative.    Eyes: Negative.    Respiratory: Negative.    Cardiovascular: Negative.    Gastrointestinal: Negative.    Endocrine: Negative.    Genitourinary: Negative.    Musculoskeletal: Positive for arthralgias (Right hip) and gait problem (Worsening pain with ambulation to the right hip). Negative for back pain and myalgias.   Skin: Negative.  Negative for color change and wound.   Neurological: Negative for weakness and numbness.   Psychiatric/Behavioral: Negative.    All other systems reviewed and are negative.      Past Medical History:   Diagnosis Date   • A-fib (CMS/[facility])    • Atherosclerosis of coronary artery bypass graft    • Atrial fibrillation (CMS/HCC)    • CAD (coronary artery disease)    • Cardiac pacemaker    • CHF (congestive heart failure) (CMS/[facility])    • Congestive heart failure (CMS/HCC)    • HLD (hyperlipidemia)    • HTN (hypertension)    • Ischemic cardiomyopathy    • MI, old    • Myocardial infarction (CMS/HCC)    • SSS (sick sinus syndrome) (CMS/[facility])        No Known  Allergies    Past Surgical History:   Procedure Laterality Date   • APPENDECTOMY     • CARDIAC PACEMAKER PLACEMENT     • CORONARY ARTERY BYPASS GRAFT     • PACEMAKER IMPLANTATION      x 3    • TONSILLECTOMY         Family History   Problem Relation Age of Onset   • Heart disease Brother        Social History     Socioeconomic History   • Marital status:      Spouse name: Not on file   • Number of children: Not on file   • Years of education: Not on file   • Highest education level: Not on file   Tobacco Use   • Smoking status: Former Smoker     Types: Cigarettes     Quit date:      Years since quittin.8   • Smokeless tobacco: Never Used   • Tobacco comment: Quit smoking age 40   Substance and Sexual Activity   • Alcohol use: No   • Drug use: No   • Sexual activity: Defer           Objective   Physical Exam  Vitals signs and nursing note reviewed.   Constitutional:       General: He is in acute distress (Appears uncomfortable due to pain).      Appearance: Normal appearance. He is well-developed, well-groomed and overweight.   HENT:      Head: Normocephalic.      Mouth/Throat:      Mouth: Mucous membranes are moist.      Pharynx: Oropharynx is clear.   Eyes:      Conjunctiva/sclera: Conjunctivae normal.      Pupils: Pupils are equal, round, and reactive to light.   Neck:      Musculoskeletal: Normal range of motion and neck supple.   Cardiovascular:      Rate and Rhythm: Normal rate and regular rhythm.      Pulses: Normal pulses.      Heart sounds: Normal heart sounds.   Pulmonary:      Effort: Pulmonary effort is normal.      Breath sounds: Normal breath sounds.   Abdominal:      General: Bowel sounds are normal.      Palpations: Abdomen is soft.   Musculoskeletal:      Right hip: He exhibits decreased range of motion (Decreased active range of motion due to pain) and bony tenderness. He exhibits normal strength.      Left hip: Normal.      Lumbar back: Normal.      Comments: Bilateral lower  "extremities neurovascularly intact distally with cap refill less than 2 seconds.      1+ pitting edema to bilateral lower extremities which does not extend past the proximal third proximally   Skin:     General: Skin is warm and dry.      Capillary Refill: Capillary refill takes less than 2 seconds.      Findings: No bruising, ecchymosis, signs of injury or wound.   Neurological:      Mental Status: He is alert and oriented to person, place, and time.      Gait: Gait normal.      Comments: 5/5 symmetric strength bilateral lower extremities.     Psychiatric:         Attention and Perception: Attention normal.         Mood and Affect: Mood normal.         Speech: Speech normal.         Behavior: Behavior normal. Behavior is cooperative.         Procedures           ED Course  ED Course as of Oct 08 1621   Thu Oct 08, 2020   0945 Discussed case at this time with Dr. Irvin Salas who recommends repeat CT imaging of the LE with no further recommendations at this time.     [JS]   1521 Upon reevaluation at this time patient is declining all imaging.  Patient states \"they just did at the [facility] and when I follow-up in 2 weeks they will tell me if I need to do it again.\"  Patient reports that he does not feel that his pain is changing in location or quality it is just more intense.  Patient reported that he has been trying to get up to walk around and do his exercises and feels that this is contributing to the increased pain.  Discussed with patient that with ambulation there is a chance he could be causing additional injuries or there may not be routine healing.  Reviewed with patient risk, benefits, and alternatives to performing CT or x-ray imaging.  Patient is alert and oriented x3 of sound mind and decision-making abilities and is able to verbalize back to me the risk, benefits, and alternatives.  Patient reports he would feel more comfortable trying some pain medications, increased rest, and following " up with his primary care provider as well as the [facility].  Discussed with patient at length strict return precautions and need to return to ED immediately should he have any new injuries, that develop any new symptoms, or have any new or worsening pain.  Discussed with patient importance of contacting the [facility] to see if he can move up his appointment due to increased pain.  Patient continues to request that he leave with no further imaging.  Patient has no further questions, concerns, or needs at this time.    [JS]   1548 [facility] report #[id number]  Patient has no prescriptions filled for the past year.     [JS]   1550 Reviewed with Dr. Maritza Vásquez report as well as patient's persistent did not have any further imaging.  Dr. Salas recommends reiterating with patient that he cannot get pain medication through the ED as no further recommendations or request at this time.    [JS]      ED Course User Index  [JS] Nik Arevalo PA-C                                           MDM  Number of Diagnoses or Management Options     Amount and/or Complexity of Data Reviewed  Tests in the radiology section of CPT®: reviewed and ordered  Tests in the medicine section of CPT®: ordered and reviewed  Decide to obtain previous medical records or to obtain history from someone other than the patient: yes  Review and summarize past medical records: yes  Discuss the patient with other providers: yes (Dr. Salas (attending))        Final diagnoses:   Right hip pain            Nik Arevalo PA-C  10/08/20 7521

## 2020-10-12 NOTE — OUTREACH NOTE
"Patient Outreach Note    [facility] patient seen at [facility] ED 10-8-2020 for right hip pain. ACM outreach with patient. He stated he lives alone but has close friends that are \"in here all the time checking on me and taking good care of me\". He reports he has \"realatives that are getting me some new appointments with the heart doctor\". ACM inquired about his home therapy and he stated \"I can't do anything I'm down in my hip\". He denied needs today and was appreciative of the outreach.     Kimberly Newby RN  Ambulatory     10/12/2020, 13:08 CDT      "

## 2020-10-15 NOTE — PROGRESS NOTES
Subjective:     Encounter Date:10/15/2020      Patient ID: Donnell Cain is a 87 y.o. male     Chief Complaint:  History of Present Illness  Patient presents today for follow up for combined congestive heart failure, coronary artery disease, permanent atrial fibrillation, sick sinus syndrome s/p pacemaker, hypertension and hyperlipidemia. Patient was admitted to the hospital in July for volume overload and hyponatremia. Patient's LVEF prior to this admission was 45% but echo during admission showed an LVEF of 24%. He had a nuclear stress to rule out ischemia- which showed large infarct with no significant ischemia. He was diuresed with good success. He was offered a Lifevest but declined. He had a telephone visit follow up last month. He reported he was doing well. His blood pressure was borderline low- therefore medications were not increased. He had a BMP ordered to evaluate renal function and electrolytes (sodium was 115 at time of consultation in July- improved with diureses as he was volume overloaded). He has not had follow up blood work. Overall patient is doing well- has been able to keep most of the fluid off. Chief complaint today is back pain he [facility]. He is taking Xarelto every other day due to bruising and skin bleeding. In review he has started taking 2 Aleve a day and this was at the time of the change of bruising/bleeding. He was switched to toprol at last OV but he is unsure if he is taking Toprol or Lopressor.     The following portions of the patient's history were reviewed and updated as appropriate: allergies, current medications, past medical history, past social history, past and problem list.    No Known Allergies    Current Outpatient Medications:   •  coenzyme Q10 100 MG capsule, Take 100 mg by mouth Daily., Disp: , Rfl:   •  diclofenac (VOLTAREN) 1 % gel gel, Apply 2 g topically to the appropriate area as directed 4 (Four) Times a Day As Needed (Pain).,  Disp: , Rfl:   •  furosemide (LASIX) 20 MG tablet, Take 1 tablet by mouth Daily., Disp: , Rfl:   •  HYDROcodone-acetaminophen (NORCO) 5-325 MG per tablet, Take 1 tablet by mouth Every 6 (Six) Hours As Needed for Moderate Pain  or Severe Pain ., Disp: 6 tablet, Rfl: 0  •  lisinopril (PRINIVIL,ZESTRIL) 10 MG tablet, Take 1 tablet by mouth Daily., Disp: , Rfl:   •  metoprolol tartrate (LOPRESSOR) 100 MG tablet, Take 100 mg by mouth Daily., Disp: , Rfl:   •  Multiple Vitamins-Minerals (MULTIVITAMIN ADULT PO), Take  by mouth., Disp: , Rfl:   •  pravastatin (PRAVACHOL) 20 MG tablet, TAKE 1 TABLET DAILY, Disp: 90 tablet, Rfl: 3  •  XARELTO 20 MG tablet, TAKE 1 TABLET DAILY WITH DINNER (Patient taking differently: Every Other Day.), Disp: 90 tablet, Rfl: 4    Social History     Socioeconomic History   • Marital status:      Spouse name: Not on file   • Number of children: Not on file   • Years of education: Not on file   • Highest education level: Not on file   Tobacco Use   • Smoking status: Former Smoker     Types: Cigarettes     Quit date:      Years since quittin.8   • Smokeless tobacco: Never Used   • Tobacco comment: Quit smoking age 40   Substance and Sexual Activity   • Alcohol use: No   • Drug use: No   • Sexual activity: Defer       Review of Systems   Constitution: Positive for malaise/fatigue. Negative for chills, decreased appetite, fever, weight gain and weight loss.   HENT: Negative for nosebleeds.    Eyes: Negative for visual disturbance.   Cardiovascular: Positive for leg swelling. Negative for chest pain, dyspnea on exertion, near-syncope, orthopnea, palpitations, paroxysmal nocturnal dyspnea and syncope.   Respiratory: Positive for shortness of breath. Negative for cough, hemoptysis and snoring.    Endocrine: Negative for cold intolerance and heat intolerance.   Hematologic/Lymphatic: Negative for bleeding problem. Does not bruise/bleed easily.   Skin: Negative for rash.        Bruising,  skin bleeding   Musculoskeletal: Positive for arthritis and back pain. Negative for falls.   Gastrointestinal: Negative for abdominal pain, constipation, diarrhea, heartburn, melena, nausea and vomiting.   Genitourinary: Negative for hematuria.   Neurological: Negative for dizziness, headaches and light-headedness.   Psychiatric/Behavioral: Negative for altered mental status.   Allergic/Immunologic: Negative for persistent infections.              Objective:     Constitutional:       Appearance: Well-developed. Frail.   Eyes:      Pupils: Pupils are equal, round, and reactive to light.   HENT:      Head: Normocephalic and atraumatic.   Neck:      Musculoskeletal: Normal range of motion and neck supple.      Vascular: No carotid bruit or JVD.   Pulmonary:      Effort: Pulmonary effort is normal.      Breath sounds: Normal breath sounds.   Cardiovascular:      Normal rate. Regular rhythm.      Murmurs: There is a systolic murmur.   Pulses:     Intact distal pulses.   Edema:     Pretibial: bilateral trace edema of the pretibial area.     Ankle: bilateral trace edema of the ankle.  Abdominal:      General: Bowel sounds are normal.      Palpations: Abdomen is soft.   Musculoskeletal: Normal range of motion.   Skin:     General: Skin is warm and dry.      Comments: Bruising and scabs   Neurological:      Mental Status: Alert and oriented to person, place, and time.      Deep Tendon Reflexes: Reflexes are normal and symmetric.   Psychiatric:         Behavior: Behavior normal.         Thought Content: Thought content normal.         Judgment: Judgment normal.             ECG 12 Lead    Date/Time: 10/15/2020 2:29 PM  Performed by: Lux Lua APRN  Authorized by: Lux Lua APRN   Comparison: compared with previous ECG from 8/8/2020  Similar to previous ECG  Rhythm: sinus rhythm  Conduction: right bundle branch block  Other findings: left ventricular hypertrophy          /65 (BP Location: Right arm, Patient  "Position: Sitting, Cuff Size: Adult)   Pulse 70   Resp 18   Ht 188 cm (74\")   Wt 84.4 kg (186 lb)   SpO2 98%   BMI 23.88 kg/m²   Lab Review:   I have reviewed       Lab Results   Component Value Date    CHOL 99 07/28/2020    TRIG 61 07/28/2020    HDL 34 (L) 07/28/2020    LDL 53 07/28/2020      Results for orders placed during the hospital encounter of 07/21/20   Adult Transthoracic Echo Complete W/ Cont if Necessary Per Protocol    Narrative · Estimated EF = 24%.  · Left ventricular systolic function is severely decreased.  · The left ventricular cavity is mildly dilated.  · Mild mitral valve regurgitation is present  · Mild tricuspid valve regurgitation is present.  · Right ventricular cavity is mildly dilated.  · Mildly reduced right ventricular systolic function noted.  · Abnormal global Longitudinal LV strain = -4.3%. Strain data was reviewed   and speckle tracking was considered accurate.  · Moderate pulmonary hypertension is present.         Assessment:          Diagnosis Plan   1. Chronic combined systolic and diastolic congestive heart failure (CMS/HCC)  Basic Metabolic Panel   2. Permanent atrial fibrillation (CMS/HCC)     3. Cardiac pacemaker     4. SSS (sick sinus syndrome) (CMS/HCC)     5. Coronary artery disease involving native coronary artery of native heart without angina pectoris     6. Hyponatremia     7. Weakness     8. Current use of long term anticoagulation            Plan:       1. Combined Congestive Heart Failure- LVEF 25%. Toprol sent in at last OV- he is unsure if he is taking. I have wrote Toprol and metoprolol succinate out for him to review when he gets home. On Lisinopril. Overall stable. Mild leg swelling- elevate and compress legs. Educated patient on the importance of daily weights. Call office if 2-3 pound weight gain overnight or 5 pounds in a week. Discussed low sodium diet, less than 2g daily. Refused Lifevest. Follow up in 2 months- consider repeat echo at that time to " reassess LVEF- Pacemaker in place currently.   2. Permanent Atrial Fibrillation- lengthy discussion had about stroke risk and need for Xarelto daily and to avoid NSAIDs. Rate controlled- asymptomatic.  3. Cardiac Pacemaker- last function stable  4. Sss s/p pacemaker  5. Coronary Artery Disease- no ischemia on recent stress with drop in EF. No angina. LDL at goal on statin.   6. Hyponatremia- will order BMP to assess  7. Weakness- chronic- improving some. Biggest complaint today back pain  8. Anticoagulated with Xarelto- lengthy discussion had       Follow up in 2 months- consider repeat echo at that time if patient would be interested in pacemaker upgrade to AICD

## 2020-10-20 PROBLEM — R26.2 INABILITY TO WALK: Status: ACTIVE | Noted: 2020-01-01

## 2020-10-20 PROBLEM — M54.9 BACK PAIN: Status: ACTIVE | Noted: 2020-01-01

## 2020-10-25 PROBLEM — M48.062 SPINAL STENOSIS, LUMBAR REGION, WITH NEUROGENIC CLAUDICATION: Status: ACTIVE | Noted: 2020-01-01

## 2020-10-26 PROBLEM — M54.10 RADICULOPATHY: Status: ACTIVE | Noted: 2020-01-01

## 2020-10-26 NOTE — ANESTHESIA PREPROCEDURE EVALUATION
Anesthesia Evaluation     Patient summary reviewed and Nursing notes reviewed   NPO Solid Status: > 8 hours  NPO Liquid Status: > 8 hours           Airway   Mallampati: I  TM distance: >3 FB  Neck ROM: full  No difficulty expected  Dental      Pulmonary    (-) sleep apnea, not a smoker  Cardiovascular   Exercise tolerance: poor (<4 METS)    Patient on routine beta blocker and Beta blocker given within 24 hours of surgery    (+) pacemaker pacemaker interrogated 1-3 months ago, hypertension, valvular problems/murmurs murmur, past MI , CAD, CABG, dysrhythmias Atrial Fib, CHF Systolic <55%, hyperlipidemia,     ROS comment: Echo 7/24/20  · Estimated EF = 24%.  · Left ventricular systolic function is severely decreased.  · The left ventricular cavity is mildly dilated.  · Mild mitral valve regurgitation is present  · Mild tricuspid valve regurgitation is present.  · Right ventricular cavity is mildly dilated.  · Mildly reduced right ventricular systolic function noted.  · Abnormal global Longitudinal LV strain = -4.3%. Strain data was reviewed and speckle tracking was considered accurate.  · Moderate pulmonary hypertension is present.    Stress test  7/2020  · Myocardial perfusion imaging indicates a large-sized infarct located in the inferior wall with no significant ischemia noted.  · Left ventricular ejection fraction is severely reduced (Calculated EF = 21%).  · Raw images reviewed with the following abnormalities noted: vertical motion artifact. Scanned with arms to the side.       Neuro/Psych  (+) weakness,     (-) seizures  GI/Hepatic/Renal/Endo    (-) no renal disease, diabetes    Musculoskeletal     (+) back pain,       ROS comment: Sudden onset back pain, inability to walk on 10/20. CT myelogram shows foraminal narrowing causing radicular pain  Abdominal    Substance History      OB/GYN          Other                      Anesthesia Plan    ASA 3     general   (Pt ate light breakfast today, will proceed at  13:45. )  intravenous induction     Anesthetic plan, all risks, benefits, and alternatives have been provided, discussed and informed consent has been obtained with: patient.

## 2020-10-28 NOTE — ANESTHESIA POSTPROCEDURE EVALUATION
"Patient: Donnell Cain    Procedure Summary     Date: 10/28/20 Room / Location:  [facility] OR  /  [facility] OR    Anesthesia Start: 0705 Anesthesia Stop: 1131    Procedure: LUMBAR discectomy, LAMINECTOMY TRANSFORAMINAL LUMBAR INTERBODY FUSION RIGHT L2-3,3-4,4-5 (Right Spine Lumbar) Diagnosis:       Radiculopathy, unspecified spinal region      (Radiculopathy, unspecified spinal region [M54.10])    Surgeon: Edwardo Perez MD Provider: Yusuf Vizcaino CRNA    Anesthesia Type: general ASA Status: 4          Anesthesia Type: general    Vitals  Vitals Value Taken Time   /67 10/28/20 1600   Temp 97.4 °F (36.3 °C) 10/28/20 1601   Pulse 70 10/28/20 1600   Resp 12 10/28/20 1600   SpO2 99 % 10/28/20 1600           Post Anesthesia Care and Evaluation    Patient location during evaluation: PACU  Patient participation: complete - patient participated  Level of consciousness: awake and alert  Pain management: adequate  Airway patency: patent  Anesthetic complications: No anesthetic complications    Cardiovascular status: acceptable  Respiratory status: acceptable  Hydration status: acceptable    Comments: Blood pressure 125/67, pulse 70, temperature 97.4 °F (36.3 °C), temperature source Axillary, resp. rate 12, height 188 cm (74.02\"), weight 87.8 kg (193 lb 9 oz), SpO2 99 %.    Pt discharged from PACU based on raheem score >8      "

## 2020-10-28 NOTE — ANESTHESIA PROCEDURE NOTES
Airway  Urgency: elective    Date/Time: 10/28/2020 7:12 AM  Airway not difficult    General Information and Staff    Patient location during procedure: OR  CRNA: Yusuf Vizcaino CRNA    Indications and Patient Condition  Indications for airway management: airway protection    Preoxygenated: yes  Mask difficulty assessment: 1 - vent by mask    Final Airway Details  Final airway type: endotracheal airway      Successful airway: ETT  Cuffed: yes   Successful intubation technique: direct laryngoscopy  Endotracheal tube insertion site: oral  Blade: Jose De Jesus  Blade size: 3.5.  ETT size (mm): 7.5  Cormack-Lehane Classification: grade I - full view of glottis  Placement verified by: chest auscultation, capnometry and palpation of cuff   Cuff volume (mL): 7  Measured from: lips  ETT/EBT  to lips (cm): 22  Number of attempts at approach: 1  Assessment: lips, teeth, and gum same as pre-op and atraumatic intubation

## 2020-10-28 NOTE — ANESTHESIA PREPROCEDURE EVALUATION
Anesthesia Evaluation     Patient summary reviewed and Nursing notes reviewed   NPO Solid Status: > 8 hours  NPO Liquid Status: > 8 hours           Airway   Mallampati: I  TM distance: >3 FB  Neck ROM: full  No difficulty expected  Dental      Pulmonary    (-) sleep apnea, not a smoker  Cardiovascular   Exercise tolerance: poor (<4 METS)    Patient on routine beta blocker and Beta blocker given within 24 hours of surgery    (+) pacemaker pacemaker interrogated 1-3 months ago, hypertension, valvular problems/murmurs murmur, past MI , CAD, CABG, dysrhythmias Atrial Fib, CHF Systolic <55%, hyperlipidemia,     ROS comment: Echo 7/24/20  · Estimated EF = 24%.  · Left ventricular systolic function is severely decreased.  · The left ventricular cavity is mildly dilated.  · Mild mitral valve regurgitation is present  · Mild tricuspid valve regurgitation is present.  · Right ventricular cavity is mildly dilated.  · Mildly reduced right ventricular systolic function noted.  · Abnormal global Longitudinal LV strain = -4.3%. Strain data was reviewed and speckle tracking was considered accurate.  · Moderate pulmonary hypertension is present.    Stress test  7/2020  · Myocardial perfusion imaging indicates a large-sized infarct located in the inferior wall with no significant ischemia noted.  · Left ventricular ejection fraction is severely reduced (Calculated EF = 21%).  · Raw images reviewed with the following abnormalities noted: vertical motion artifact. Scanned with arms to the side.    Vivo pacemaker interrogation report reviewed-- 9/2020. 1 year battery remaining. VVI, 98% v-paced     PO toprol obtained from pharmacy and given in holding    Neuro/Psych  (+) weakness,     (-) seizures  GI/Hepatic/Renal/Endo    (-) no renal disease, diabetes    Musculoskeletal     (+) back pain,       ROS comment: Sudden onset back pain, inability to walk on 10/20. CT myelogram shows foraminal narrowing causing radicular pain   Abdominal    Substance History      OB/GYN          Other                          Anesthesia Plan    ASA 4     general   (Place magnet to place device in asynchronous mode, interrogate in recovery  Arterial line for close monitoring)  intravenous induction     Anesthetic plan, all risks, benefits, and alternatives have been provided, discussed and informed consent has been obtained with: patient.

## 2020-10-29 PROBLEM — R31.0 GROSS HEMATURIA: Status: ACTIVE | Noted: 2020-01-01

## 2020-11-01 NOTE — PROGRESS NOTES
Donnell Cain  87 y.o.      Chief complaint:   Back pain    Subjective  No complaints this morning.  Right lower extremity pain resolved.  Tolerating all p.o.    Temp:  [97.5 °F (36.4 °C)-98.3 °F (36.8 °C)] 98.1 °F (36.7 °C)  Heart Rate:  [70-80] 70  Resp:  [16-18] 16  BP: (130-143)/(61-71) 134/61      Objective    Neurologic Exam     Mental Status   Oriented to person, place, and time.   Attention: normal.   Speech: speech is normal   Level of consciousness: alert  Knowledge: good.     Cranial Nerves     CN II   Visual fields full to confrontation.     CN III, IV, VI   Pupils are equal, round, and reactive to light.  Extraocular motions are normal.     CN V   Facial sensation intact.     CN VII   Facial expression full, symmetric.     CN VIII   CN VIII normal.     CN IX, X   CN IX normal.   CN X normal.     CN XI   CN XI normal.     CN XII   CN XII normal.     Motor Exam   Muscle bulk: normal  Overall muscle tone: normal  Right arm pronator drift: absent  Left arm pronator drift: absent    Strength   Strength 5/5 except as noted. Right lower extremity psoas and quadriceps 2 out of 5.     Sensory Exam   Light touch normal.   Pinprick normal.     Gait, Coordination, and Reflexes     Coordination   Finger to nose coordination: normal    Tremor   Resting tremor: absent  Intention tremor: absent  Action tremor: absent    Reflexes   Reflexes 2+ except as noted.         Back pain    CAD (coronary artery disease)    Chronic combined systolic and diastolic congestive heart failure (CMS/HCC)    Permanent atrial fibrillation (CMS/HCC)    HLD (hyperlipidemia)    HTN (hypertension)    Cardiac pacemaker    Inability to walk    Spinal stenosis, lumbar region, with neurogenic claudication    Radiculopathy    Gross hematuria      Lab Results (last 24 hours)     ** No results found for the last 24 hours. **              Plan:   Status post multilevel lumbar fusion.  Under good control.  Mobilize with physical therapy  today.    Edwardo Perez MD

## 2020-11-01 NOTE — THERAPY TREATMENT NOTE
Acute Care - Occupational Therapy Treatment Note  [facility]     Patient Name: Donnell Cain  : 1933  MRN: [id number]  Today's Date: 2020  Onset of Illness/Injury or Date of Surgery: 10/20/20  Date of Referral to OT: 10/20/20  Referring Physician: Juan Jose ADAMS    Admit Date: 10/20/2020       ICD-10-CM ICD-9-CM   1. Inability to walk  R26.2 719.7   2. Decreased activities of daily living (ADL)  Z78.9 V49.89   3. Impaired functional mobility, balance, gait, and endurance  Z74.09 V49.89   4. Radiculopathy, unspecified spinal region  M54.10 729.2   5. Low back pain, unspecified back pain laterality, unspecified chronicity, unspecified whether sciatica present  M54.5 724.2   6. Spinal stenosis, lumbar region, with neurogenic claudication  M48.062 724.03     Patient Active Problem List   Diagnosis   • CAD (coronary artery disease)   • Chronic combined systolic and diastolic congestive heart failure (CMS/HCC)   • SSS (sick sinus syndrome) (CMS/HCC)   • Permanent atrial fibrillation (CMS/HCC)   • HLD (hyperlipidemia)   • HTN (hypertension)   • Murmur   • Cardiac pacemaker   • Hyponatremia   • Weakness   • Current use of long term anticoagulation   • Inability to walk   • Back pain   • Spinal stenosis, lumbar region, with neurogenic claudication   • Radiculopathy   • Gross hematuria     Past Medical History:   Diagnosis Date   • A-fib (CMS/HCC)    • Atherosclerosis of coronary artery bypass graft    • Atrial fibrillation (CMS/HCC)    • CAD (coronary artery disease)    • Cardiac pacemaker    • CHF (congestive heart failure) (CMS/HCC)    • Congestive heart failure (CMS/HCC)    • HLD (hyperlipidemia)    • HTN (hypertension)    • Ischemic cardiomyopathy    • MI, old    • Myocardial infarction (CMS/HCC)    • SSS (sick sinus syndrome) (CMS/HCC)      Past Surgical History:   Procedure Laterality Date   • APPENDECTOMY     • CARDIAC PACEMAKER PLACEMENT     • CORONARY ARTERY BYPASS GRAFT     • LUMBAR LAMINECTOMY  WITH FUSION Right 10/28/2020    Procedure: LUMBAR discectomy, LAMINECTOMY TRANSFORAMINAL LUMBAR INTERBODY FUSION RIGHT L2-3,3-4,4-5;  Surgeon: Edwardo Perez MD;  Location: [facility];  Service: Neurosurgery;  Laterality: Right;   • PACEMAKER IMPLANTATION      x 3    • TONSILLECTOMY            OT ASSESSMENT FLOWSHEET (last 12 hours)      OT Evaluation and Treatment     Row Name 11/01/20 0962                   OT Time and Intention    Subjective Information  complains of;weakness;fatigue;pain  -        Document Type  therapy note (daily note)  -        Mode of Treatment  occupational therapy  -        Patient Effort  adequate  -           General Information    Existing Precautions/Restrictions  fall;brace worn when out of bed;spinal pain!  -           Pain Assessment    Additional Documentation  Pain Scale: Word Pre/Post-Treatment (Group)  -           Pain Scale: Numbers Pre/Post-Treatment    Pretreatment Pain Rating  7/10  -        Posttreatment Pain Rating  7/10  -        Pain Location - Side  Right  -        Pain Location - Orientation  incisional  -        Pain Location  back;hip  -        Pain Intervention(s)  Rest  -           Bed Mobility    Comment (Bed Mobility)  fowlers in bed!  -           Motor Skills    Motor Skills  therapeutic exercise  -        Therapeutic Exercise  shoulder;elbow/forearm  -           Shoulder (Therapeutic Exercise)    Shoulder (Therapeutic Exercise)  AROM (active range of motion);strengthening exercise  -        Shoulder AROM (Therapeutic Exercise)  bilateral;flexion;extension;sitting;10 repetitions;2 sets  -        Shoulder Strengthening (Therapeutic Exercise)  bilateral;flexion;extension;sitting;2 lb free weight;3 lb free weight  -           Elbow/Forearm (Therapeutic Exercise)    Elbow/Forearm (Therapeutic Exercise)  AROM (active range of motion);strengthening exercise  -        Elbow/Forearm AROM (Therapeutic Exercise)   bilateral;flexion;extension;sitting;10 repetitions;2 sets  -CJ           Wound 10/27/20 1509 Right heel    Wound - Properties Group Placement Date: 10/27/20  -GP Placement Time: 1509  -GP Side: Right  -GP Location: heel  -GP    Retired Wound - Properties Group Date first assessed: 10/27/20  -GP Time first assessed: 1509  -GP Side: Right  -GP Location: heel  -GP       Wound 10/28/20 0107 Left anterior foot    Wound - Properties Group Placement Date: 10/28/20  -TR Placement Time: 0107  -TR Side: Left  -TR Orientation: anterior  -TR Location: foot  -TR    Retired Wound - Properties Group Date first assessed: 10/28/20  -TR Time first assessed: 0107  -TR Side: Left  -TR Location: foot  -TR       Wound 10/28/20 1133 lumbar spine Incision    Wound - Properties Group Placement Date: 10/28/20  -HW Placement Time: 1133  -HW Present on Hospital Admission: N  -HW Location: lumbar spine  -HW Primary Wound Type: Incision  -HW    Retired Wound - Properties Group Date first assessed: 10/28/20  -HW Time first assessed: 1133  -HW Present on Hospital Admission: N  -HW Location: lumbar spine  -HW Primary Wound Type: Incision  -HW       Positioning and Restraints    Pre-Treatment Position  in bed  -CJ        Post Treatment Position  bed  -CJ        In Bed  fowlers;call light within reach;encouraged to call for assist;side rails up x2;RLE elevated;R heel elevated  -CJ           Progress Summary (OT)    Progress Toward Functional Goals (OT)  progress toward functional goals is fair  -CJ        Barriers to Overall Progress (OT)  Fall/spina//lso on oob!  -CJ        Impairments Still Limiting Function (OT)  continue with ot poc!  -CJ          User Key  (r) = Recorded By, (t) = Taken By, (c) = Cosigned By    Initials Name Effective Dates    CJ Irineo Coleman COTA/L 08/02/16 -     GP Marilou Martinez RN 07/17/20 -     HW Justin Diaz, JB 06/05/20 -     TR Meliza Rucker RN 12/31/19 -          Occupational Therapy Education                  Title: PT OT SLP Therapies (Not Started)     Topic: Occupational Therapy (Done)     Point: ADL training (Done)     Description:   Instruct learner(s) on proper safety adaptation and remediation techniques during self care or transfers.   Instruct in proper use of assistive devices.              Learning Progress Summary           Patient Acceptance, E, VU by JLES at 10/29/2020 1150    Acceptance, E, VU by  at 10/23/2020 1202    Comment: Role of OT, safety with ADL, importance of OOB activites    Acceptance, E, VU by  at 10/21/2020 1021                   Point: Home exercise program (Done)     Description:   Instruct learner(s) on appropriate technique for monitoring, assisting and/or progressing therapeutic exercises/activities.              Learning Progress Summary           Patient Acceptance, E,TB, VU,DU,NR by  at 11/1/2020 1337    Comment: Pt. performed ue exs with c/o's during tx of fatigue!                   Point: Precautions (Done)     Description:   Instruct learner(s) on prescribed precautions during self-care and functional transfers.              Learning Progress Summary           Patient Acceptance, E,TB, VU,DU,NR by  at 11/1/2020 1337    Comment: Pt. performed ue exs with c/o's during tx of fatigue!    Acceptance, E, VU by RICK at 10/29/2020 1150                   Point: Body mechanics (Done)     Description:   Instruct learner(s) on proper positioning and spine alignment during self-care, functional mobility activities and/or exercises.              Learning Progress Summary           Patient Acceptance, E,TB, VU,DU,NR by  at 11/1/2020 1337    Comment: Pt. performed ue exs with c/o's during tx of fatigue!    Acceptance, E, VU by  at 10/29/2020 1150                               User Key     Initials Effective Dates Name Provider Type Discipline     04/09/19 -  Laci Randolph OTCLEMENTINA/L, CNT Occupational Therapist OT     08/02/16 -  Irineo Coleman COTA/L Occupational Therapy  Assistant OT    RICK 07/05/20 -  Jen Flores OTR/L Occupational Therapist OT     07/16/20 -  Isabell Jarquin OT Student OT Student OT                  OT Recommendation and Plan     Progress Toward Functional Goals (OT): progress toward functional goals is fair  Plan of Care Review  Plan of Care Reviewed With: patient  Progress: improving  Outcome Summary: Pt. fowlers in bed, this a.m., this meza/l offered pt. a shower in rolling shower chair and pt. stated that he wasn't up for it yet, same offer this p.m., but pt. refused! Pt. agrees to ue exs in bed where he doesn't have to move! No issues with tx, No enthusiasm for therapy!  Plan of Care Reviewed With: patient  Outcome Summary: Pt. fowlers in bed, this a.m., this meza/l offered pt. a shower in rolling shower chair and pt. stated that he wasn't up for it yet, same offer this p.m., but pt. refused! Pt. agrees to ue exs in bed where he doesn't have to move! No issues with tx, No enthusiasm for therapy!    Outcome Measures     Row Name 11/01/20 4679             How much help from another is currently needed...    Putting on and taking off regular lower body clothing?  2  -CJ      Bathing (including washing, rinsing, and drying)  2  -CJ      Toileting (which includes using toilet bed pan or urinal)  2  -CJ      Putting on and taking off regular upper body clothing  2  -CJ      Taking care of personal grooming (such as brushing teeth)  4  -CJ      Eating meals  4  -CJ      AM-PAC 6 Clicks Score (OT)  16  -         Functional Assessment    Outcome Measure Options  AM-PAC 6 Clicks Daily Activity (OT)  -CJ        User Key  (r) = Recorded By, (t) = Taken By, (c) = Cosigned By    Initials Name Provider Type    Irineo Lovelace COTA/L Occupational Therapy Assistant          Time Calculation:   Time Calculation- OT     Row Name 11/01/20 0266             Time Calculation- OT    OT Start Time  1337  -      OT Stop Time  1405  -      OT Time Calculation (min)   28 min  -      Total Timed Code Minutes- OT  28 minute(s)  -      TCU Minutes- OT  28 min  -      OT Received On  11/01/20  -         Timed Charges    26272 - OT Therapeutic Exercise Minutes  28  -        User Key  (r) = Recorded By, (t) = Taken By, (c) = Cosigned By    Initials Name Provider Type     Irineo Coleman COTA/L Occupational Therapy Assistant        Therapy Charges for Today     Code Description Service Date Service Provider Modifiers Qty    [id number]  OT THER PROC EA 15 MIN 11/1/2020 Irineo Coleman COTA/L GO 2               ARTIE Estrella  11/1/2020

## 2020-11-01 NOTE — PLAN OF CARE
Problem: Adult Inpatient Plan of Care  Goal: Plan of Care Review  Outcome: Ongoing, Progressing  Flowsheets (Taken 11/1/2020 3129)  Progress: improving  Plan of Care Reviewed With: patient  Outcome Summary: Pt. ramywlers in bed, this a.m., this meza/l offered pt. a shower in rolling shower chair and pt. stated that he wasn't up for it yet, same offer this p.m., but pt. refused! Pt. agrees to ue exs in bed where he doesn't have to move! No issues with tx, No enthusiasm for therapy!

## 2020-11-01 NOTE — PROGRESS NOTES
1           [facility]  INPATIENT PROGRESS NOTE    Patient Name: Donnell Cain  Date of Admission: 10/20/2020  Today's Date: 11/01/20  Length of Stay: 4  Primary Care Physician: No primary care provider on file.    Subjective   Chief Complaint: Follow-up  HPI   PT/OT note reviewed.  Patient reportedly fearful of falling.  Patient reported the participated upper extremity exercises.  No reported hematuria despite starting on Xarelto    Patient has no new complaints.  He is happy that he can move his foot and feel his foot.  He still feels unable to carry his right leg.  He jokingly told me that if he cannot walk he will start learning how to walk with his hands.  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  Review of Systems     All pertinent negatives and positives are as above. All other systems have been reviewed and are negative unless otherwise stated.     Objective    Temp:  [97.9 °F (36.6 °C)-98.5 °F (36.9 °C)] 98.1 °F (36.7 °C)  Heart Rate:  [68-80] 70  Resp:  [16] 16  BP: (119-143)/(61-74) 130/70  Physical Exam  Pleasant man  No distress  Interactive  Coherent  Able to carry out conversation with good sense of humor  Supple neck  Regular rate and rhythm  Pacemaker in situ left upper chest  Normal respiratory effort  No adventitious sounds  Soft abdomen, nontender, no organomegaly  No cyanosis clubbing or edema  He is able to flex his right knee to a certain degree, he is able to move his right foot smoothly.      Results Review:  I have reviewed the labs, radiology results, and diagnostic studies.    Laboratory Data:   Results from last 7 days   Lab Units 10/30/20  0349 10/29/20  0627 10/28/20  2043   WBC 10*3/mm3 14.89* 13.78* 16.83*   HEMOGLOBIN g/dL 11.3* 12.3* 12.2*   HEMATOCRIT % 34.7* 38.5 37.9   PLATELETS 10*3/mm3 142 184 202        Results from last 7 days   Lab Units 10/30/20  0349 10/29/20  0627 10/28/20  2043    SODIUM mmol/L 137 138 136   POTASSIUM mmol/L 3.9 3.7 4.3   CHLORIDE mmol/L 99 96* 100   CO2 mmol/L 31.0* 33.0* 27.0   BUN mg/dL 31* 30* 30*   CREATININE mg/dL 0.89 1.10 0.83   CALCIUM mg/dL 7.8* 8.3* 8.4*   GLUCOSE mg/dL 124* 99 120*       Culture Data:   No results found for: BLOODCX, URINECX, WOUNDCX, MRSACX, RESPCX, STOOLCX    Radiology Data:   Imaging Results (Last 24 Hours)     ** No results found for the last 24 hours. **          I have reviewed the patient's current medications.     Assessment/Plan     Active Hospital Problems    Diagnosis   • **Back pain   • Gross hematuria   • Inability to walk   • Spinal stenosis, lumbar region, with neurogenic claudication     Added automatically from request for surgery [id number]     • Radiculopathy     Added automatically from request for surgery [id number]     • Cardiac pacemaker   • Chronic combined systolic and diastolic congestive heart failure (CMS/HCC)   • CAD (coronary artery disease)   • HLD (hyperlipidemia)   • HTN (hypertension)   • Permanent atrial fibrillation (CMS/HCC)   Resolved delirium  Resolved gross hematuria likely from traumatic tugging of Lorenz catheter post surgery      Radiculopathy status post multilevel lumbar fusion, hemilaminectomy, foraminotomy by Dr. Perez on October 28  Started back on Xarelto  Continue PT OT; [facility]  Continue physical rehabilitation  Continue supportive care  Hemodynamically stable    furosemide, 20 mg, Oral, Daily  lisinopril, 10 mg, Oral, Daily  metoprolol succinate XL, 100 mg, Oral, Q24H  Naphazoline-Pheniramine, 2 drop, Both Eyes, Daily  pantoprazole, 40 mg, Oral, Q AM  pravastatin, 20 mg, Oral, Nightly  rivaroxaban, 20 mg, Oral, Daily With Dinner  sodium chloride, 10 mL, Intravenous, Q12H  sodium chloride, 3 mL, Intravenous, Q12H  ziprasidone, 10 mg, Intramuscular, Once          Discharge Planning:?  Electronically signed by Gómez Leon MD, 11/01/20, 16:18 CST.

## 2020-11-01 NOTE — PLAN OF CARE
Problem: Adult Inpatient Plan of Care  Goal: Plan of Care Review  Flowsheets (Taken 11/1/2020 1116)  Progress: no change  Plan of Care Reviewed With: patient  Outcome Summary: Pt cont to co increased pain. Bed mobility mod/max 2. Pt very anxious and fearful of falling. Pt able to stand x3 w/ bed elevated and blocking RLE due to weakness. Pt required rests bw stands and worked on BLE strengthening. Pt will need cont PT upon d/c

## 2020-11-01 NOTE — PLAN OF CARE
Goal Outcome Evaluation:  Plan of Care Reviewed With: patient  Progress: improving  Outcome Summary: Patient c/o pain, prn pain meds given as ordered. No neuro changes A&O x4. Steri strips to back. turn Q2 hour. VSS, Safety maintained.

## 2020-11-01 NOTE — PROGRESS NOTES
Donnell Cain  87 y.o.      Chief complaint:   Back pain    Subjective  Pain control.  Tolerating p.o.  Working physical therapy.    Temp:  [97.5 °F (36.4 °C)-98.3 °F (36.8 °C)] 98.1 °F (36.7 °C)  Heart Rate:  [70-80] 70  Resp:  [16-18] 16  BP: (130-143)/(61-71) 134/61      Objective    Neurologic Exam     Mental Status   Oriented to person, place, and time.   Attention: normal.   Speech: speech is normal   Level of consciousness: alert  Knowledge: good.   Confusion improving     Cranial Nerves     CN II   Visual fields full to confrontation.     CN III, IV, VI   Pupils are equal, round, and reactive to light.  Extraocular motions are normal.     CN V   Facial sensation intact.     CN VII   Facial expression full, symmetric.     CN VIII   CN VIII normal.     CN IX, X   CN IX normal.   CN X normal.     CN XI   CN XI normal.     CN XII   CN XII normal.     Motor Exam   Muscle bulk: normal  Overall muscle tone: normal  Right arm pronator drift: absent  Left arm pronator drift: absent    Strength   Strength 5/5 except as noted. Right psoas and quadriceps 2 out of 5     Sensory Exam   Light touch normal.   Pinprick normal.     Gait, Coordination, and Reflexes     Gait  Gait: normal    Coordination   Finger to nose coordination: normal    Tremor   Resting tremor: absent  Intention tremor: absent  Action tremor: absent    Reflexes   Reflexes 2+ except as noted.         Back pain    CAD (coronary artery disease)    Chronic combined systolic and diastolic congestive heart failure (CMS/HCC)    Permanent atrial fibrillation (CMS/HCC)    HLD (hyperlipidemia)    HTN (hypertension)    Cardiac pacemaker    Inability to walk    Spinal stenosis, lumbar region, with neurogenic claudication    Radiculopathy    Gross hematuria      Lab Results (last 24 hours)     ** No results found for the last 24 hours. **              Plan:   Multilevel lumbar fusion.  Right paraspinal back pain and right hip pain resolved.  Lower extremity  weakness stable.  Working with physical therapy.  Likely due to placement.    Edwardo Perez MD

## 2020-11-01 NOTE — PLAN OF CARE
Goal Outcome Evaluation:  Plan of Care Reviewed With: patient  Progress: improving  Has been in better sprits and working with PT more today, gave tylenol for pain, TQ2, keep heels elevated,  in place, dressing CDI, continue to monitor, VSS.Plan is [facility] soon

## 2020-11-02 NOTE — PROGRESS NOTES
"Donnell Cain  87 y.o.      Chief complaint:   Lumbar stenosis, lower extremity weakness    Subjective  No events    Temp:  [97.9 °F (36.6 °C)-98.3 °F (36.8 °C)] 98.2 °F (36.8 °C)  Heart Rate:  [68-73] 69  Resp:  [16-18] 16  BP: (119-138)/(60-75) 138/61      Objective:  General Appearance:  Comfortable, well-appearing, in no acute distress and not in pain.    Vital signs: (most recent): Blood pressure 138/61, pulse 69, temperature 98.2 °F (36.8 °C), temperature source Oral, resp. rate 16, height 188 cm (74.02\"), weight 83.5 kg (184 lb), SpO2 100 %.  Vital signs are normal.  No fever.    Output: Producing urine.    HEENT: Normal HEENT exam.    Lungs:  Normal effort and normal respiratory rate.  He is not in respiratory distress.    Heart: Normal rate.  Regular rhythm.    Chest: Symmetric chest wall expansion.   Skin:  Warm and dry.    Abdomen: Abdomen is soft and non-distended.  Bowel sounds are normal.   There is no abdominal tenderness.     Pulses: Distal pulses are intact.        Neurologic Exam     Mental Status   Oriented to person, place, and time.   Oriented to person.   Oriented to place.   Attention: normal. Concentration: normal.   Speech: speech is normal   Level of consciousness: alert  Knowledge: good.   Normal comprehension.   Confusion improving     Cranial Nerves   Cranial nerves II through XII intact.     CN II   Visual fields full to confrontation.     CN III, IV, VI   Pupils are equal, round, and reactive to light.  Extraocular motions are normal.     CN V   Facial sensation intact.     CN VII   Facial expression full, symmetric.     CN VIII   CN VIII normal.     CN IX, X   CN IX normal.   CN X normal.     CN XI   CN XI normal.     CN XII   CN XII normal.     Motor Exam   Muscle bulk: normal  Overall muscle tone: normal  Right arm pronator drift: absent  Left arm pronator drift: absent    Strength   Strength 5/5 except as noted. Right psoas and quadriceps 2 out of 5  Right hamstring 4- out of 5 "     Sensory Exam   Light touch normal.   Pinprick normal.     Gait, Coordination, and Reflexes     Coordination   Finger to nose coordination: normal    Tremor   Resting tremor: absent  Intention tremor: absent  Action tremor: absent    Reflexes   Reflexes 2+ except as noted.         Back pain    CAD (coronary artery disease)    Chronic combined systolic and diastolic congestive heart failure (CMS/HCC)    Permanent atrial fibrillation (CMS/HCC)    HLD (hyperlipidemia)    HTN (hypertension)    Cardiac pacemaker    Inability to walk    Spinal stenosis, lumbar region, with neurogenic claudication    Radiculopathy    Gross hematuria      Lab Results (last 24 hours)     ** No results found for the last 24 hours. **              Plan:   Patient doing well.  Making improvements with the strength in his right lower extremity.  Waiting for rehab placement    Juan Jose Gilmore, APRN

## 2020-11-02 NOTE — PROGRESS NOTES
Continued Stay Note  Psychiatric     Patient Name: Donnell Cain  MRN: [id number]  Today's Date: 11/2/2020    Admit Date: 10/20/2020    Discharge Plan     Row Name 11/02/20 1239       Plan    Plan Comments  Spoke to pt nephew (Satish) [phone] to inform that [facility] did deny bed. Discussed SNF placement in [facility]. Satish is requesting a referral be sent to [facility]. Referral has been sent, await answer.        Discharge Codes    No documentation.             KISHA Curry

## 2020-11-02 NOTE — PLAN OF CARE
Problem: Adult Inpatient Plan of Care  Goal: Plan of Care Review  Recent Flowsheet Documentation  Taken 11/2/2020 0940 by Alyse Santos, PTA  Progress: no change  Plan of Care Reviewed With: patient  Outcome Summary: pt very fearful of falling, anticipates pain, hesitant to move, pt trans to EOB mod-max of 2, mod-max assist to tam LSO, sit-stand mod-max, stood x 2, trans back to bed mod-max of 2, BLE HEP X 10 reps RLE AAROM, LLE AROM, pt would benefit from SNF

## 2020-11-02 NOTE — PROGRESS NOTES
[facility] Medicine Services  INPATIENT PROGRESS NOTE    Patient Name: Donnell Cain  Date of Admission: 10/20/2020  Today's Date: 11/02/20  Length of Stay: 5  Primary Care Physician: No primary care provider on file.    Subjective   Chief Complaint: Follow-up back pain  HPI   Patient seen and examined at bedside.  He was resting comfortably in bed.  He is alert and oriented x3.  He was working with physical therapy.  He was able to stand today but was unable to move his feet.  Patient tells me his pain in his lower back and right hip was unbearable and that is why he could not advance his feet.  Discussed with him the importance of taking pain medication prior to working with physical therapy.  He denies shortness of breath, chest pain or pressure.  He is tolerating a diet.  He is voiding without difficulty per urinal.  Urine is noted to be light pink without obvious clots.    Review of Systems   All pertinent negatives and positives are as above. All other systems have been reviewed and are negative unless otherwise stated.     Objective    Temp:  [97.9 °F (36.6 °C)-98.3 °F (36.8 °C)] 98.2 °F (36.8 °C)  Heart Rate:  [68-73] 69  Resp:  [16-18] 16  BP: (119-138)/(60-75) 138/61  Physical Exam  Vitals signs reviewed.   Constitutional:       General: He is not in acute distress.     Appearance: He is not toxic-appearing.      Comments: Lying in bed resting comfortably.  Tolerating room air.  No distress.   HENT:      Head: Normocephalic and atraumatic.      Mouth/Throat:      Mouth: Mucous membranes are moist.      Pharynx: Oropharynx is clear.   Eyes:      Extraocular Movements: Extraocular movements intact.      Conjunctiva/sclera: Conjunctivae normal.      Pupils: Pupils are equal, round, and reactive to light.   Neck:      Musculoskeletal: Normal range of motion and neck supple. No muscular tenderness.   Cardiovascular:      Rate and Rhythm: Normal rate and regular rhythm.       Pulses: Normal pulses.      Heart sounds: No murmur.   Pulmonary:      Effort: Pulmonary effort is normal. No respiratory distress.      Breath sounds: Normal heart sounds.  No wheezing or rhonchi.   Abdominal:      General: Bowel sounds are normal. There is no distension.      Palpations: Abdomen is soft.      Tenderness: There is no abdominal tenderness.   Genitourinary:     Comments: Urinal with light pink urine without obvious clots  Musculoskeletal: Normal range of motion.         General: No swelling or tenderness.   Skin:     General: Skin is warm and dry.      Findings: No erythema or rash.   Neurological:      General: No focal deficit present.      Mental Status: He is alert.      Cranial Nerves: No cranial nerve deficit.      Motor: No weakness.      Comments: Oriented to person, place, situation, and year.  Psychiatric:         Mood and Affect: Mood normal.         Behavior: Behavior normal.     Results Review:  I have reviewed the labs, radiology results, and diagnostic studies.    Laboratory Data:   Results from last 7 days   Lab Units 10/30/20  0349 10/29/20  0627 10/28/20  2043   WBC 10*3/mm3 14.89* 13.78* 16.83*   HEMOGLOBIN g/dL 11.3* 12.3* 12.2*   HEMATOCRIT % 34.7* 38.5 37.9   PLATELETS 10*3/mm3 142 184 202        Results from last 7 days   Lab Units 10/30/20  0349 10/29/20  0627 10/28/20  2043   SODIUM mmol/L 137 138 136   POTASSIUM mmol/L 3.9 3.7 4.3   CHLORIDE mmol/L 99 96* 100   CO2 mmol/L 31.0* 33.0* 27.0   BUN mg/dL 31* 30* 30*   CREATININE mg/dL 0.89 1.10 0.83   CALCIUM mg/dL 7.8* 8.3* 8.4*   GLUCOSE mg/dL 124* 99 120*     I have reviewed the patient's current medications.     Assessment/Plan     Active Hospital Problems    Diagnosis   • **Back pain   • Gross hematuria   • Inability to walk   • Spinal stenosis, lumbar region, with neurogenic claudication     Added automatically from request for surgery [id number]     • Radiculopathy     Added automatically from request for surgery [id number]      • Cardiac pacemaker   • Chronic combined systolic and diastolic congestive heart failure (CMS/HCC)   • CAD (coronary artery disease)   • HLD (hyperlipidemia)   • HTN (hypertension)   • Permanent atrial fibrillation (CMS/HCC)     Plan:  1.  Patient presented on 10/20 with a 3-day history of inability to walk.  Patient stated whenever he moves he gets pain in his right lower back/hip area that radiates down his right leg to his toes.  CT of the pelvis showed acute appearing mild to moderate height loss of L5.  Infrarenal aortic aneurysm measuring 3.1 cm.  CT of the lumbar spine showed multilevel degenerative disc, endplate, and facet disease with multilevel foraminal stenosis.  Foraminal narrowing greatest on the right L4/5.  Cardiology was notified while he was in the emergency department regarding holding Xarelto and agreed.  2.  Neurosurgery evaluating patient.  CT Myelogram of lumbar spine on 10/23 demonstrates a rightward coronal degenerative scoliotic deformity causing 2 levels of severe rightward foraminal narrowing.  Lumbar fusion and decompression 10/28 per Dr. Perez.  3.  Delirium has resolved - likely related to prolonged hospitalization, disruption of normal sleep-wake cycle, age, and steroid use.   Chest x-ray negative for acute cardiopulmonary process.  Urinalysis unremarkable.  Leukocytosis likely secondary to steroid use.  He has remained afebrile.    4.  He has a history of chronic combined systolic and diastolic heart failure with last EF on echo in July 2020 noted to be 24%.  Per last cardiology office note on 10/15/2020, patient has refused LifeVest.  He is currently euvolemic.  Continue to monitor strict intake and output.  Daily weights.  Continue Toprol-XL, lisinopril, Lasix, and statin.  5.  Urology evaluated patient for gross hematuria.  He had a Lorenz catheter postoperatively.  Patient was agitated confused and subsequently was tugging on his Lorenz catheter.  He had gross hematuria  requiring CBI on 10/29.  It has since resolved and he is voiding per urinal without difficulty.  6.  He has history of atrial fibrillation status post pacemaker and anticoagulated on Xarelto.  Xarelto held on admission.  INR was 2.19 on admission.  On 10/23 he received FFP for goal of INR less than 1.5 before proceeding with CT Myelogram.  Xarelto was resumed on 10/31.  7.  Hyponatremia - resolved.   8.  Continue Physical and Occupational Therapy.  9.   following for disposition needs.  Patient is awaiting for skilled nursing facility placement.     Discharge Planning: As per attending physician.    Electronically signed by BRYAN Aguilar, 11/02/20, 10:55 CST.    I personally evaluated and examined the patient in conjunction with Martha ADAMS and agree with the assessment, treatment plan, and disposition of the patient as recorded by her. My history, exam, and further recommendations are:     Vitals:    11/02/20 1141   BP: 122/59   Pulse: 70   Resp: 16   Temp: 98.3 °F (36.8 °C)   SpO2: 99%   denied by rachel. Referral to snf in place per   No distress  No significant change in status  Hemodynamically stable      Electronically signed by Gómez Leon MD, 11/2/2020, 13:23 CST.

## 2020-11-02 NOTE — THERAPY TREATMENT NOTE
Acute Care - Occupational Therapy Treatment Note  [facility]     Patient Name: Donnell Cain  : 1933  MRN: [id number]  Today's Date: 2020  Onset of Illness/Injury or Date of Surgery: 10/20/20  Date of Referral to OT: 10/20/20  Referring Physician: Juan Jose ADAMS    Admit Date: 10/20/2020       ICD-10-CM ICD-9-CM   1. Inability to walk  R26.2 719.7   2. Decreased activities of daily living (ADL)  Z78.9 V49.89   3. Impaired functional mobility, balance, gait, and endurance  Z74.09 V49.89   4. Radiculopathy, unspecified spinal region  M54.10 729.2   5. Low back pain, unspecified back pain laterality, unspecified chronicity, unspecified whether sciatica present  M54.5 724.2   6. Spinal stenosis, lumbar region, with neurogenic claudication  M48.062 724.03     Patient Active Problem List   Diagnosis   • CAD (coronary artery disease)   • Chronic combined systolic and diastolic congestive heart failure (CMS/HCC)   • SSS (sick sinus syndrome) (CMS/HCC)   • Permanent atrial fibrillation (CMS/HCC)   • HLD (hyperlipidemia)   • HTN (hypertension)   • Murmur   • Cardiac pacemaker   • Hyponatremia   • Weakness   • Current use of long term anticoagulation   • Inability to walk   • Back pain   • Spinal stenosis, lumbar region, with neurogenic claudication   • Radiculopathy   • Gross hematuria     Past Medical History:   Diagnosis Date   • A-fib (CMS/HCC)    • Atherosclerosis of coronary artery bypass graft    • Atrial fibrillation (CMS/HCC)    • CAD (coronary artery disease)    • Cardiac pacemaker    • CHF (congestive heart failure) (CMS/HCC)    • Congestive heart failure (CMS/HCC)    • HLD (hyperlipidemia)    • HTN (hypertension)    • Ischemic cardiomyopathy    • MI, old    • Myocardial infarction (CMS/HCC)    • SSS (sick sinus syndrome) (CMS/HCC)      Past Surgical History:   Procedure Laterality Date   • APPENDECTOMY     • CARDIAC PACEMAKER PLACEMENT     • CORONARY ARTERY BYPASS GRAFT     • LUMBAR LAMINECTOMY  WITH FUSION Right 10/28/2020    Procedure: LUMBAR discectomy, LAMINECTOMY TRANSFORAMINAL LUMBAR INTERBODY FUSION RIGHT L2-3,3-4,4-5;  Surgeon: Edwardo Perez MD;  Location: [facility];  Service: Neurosurgery;  Laterality: Right;   • PACEMAKER IMPLANTATION      x 3    • TONSILLECTOMY            OT ASSESSMENT FLOWSHEET (last 12 hours)      OT Evaluation and Treatment     Row Name 11/02/20 0918                   OT Time and Intention    Subjective Information  complains of;pain  -AC        Document Type  therapy note (daily note)  -AC        Mode of Treatment  occupational therapy  -AC        Patient Effort  adequate  -AC           General Information    Existing Precautions/Restrictions  brace worn when out of bed;fall;spinal  -AC        Barriers to Rehab  physical barrier  -AC           Pain Assessment    Additional Documentation  Pain Scale: Numbers Pre/Post-Treatment (Group)  -AC           Pain Scale: Numbers Pre/Post-Treatment    Pretreatment Pain Rating  4/10  -AC        Pain Location  back incisional  -AC        Pain Intervention(s)  Medication (See MAR);Repositioned;Ambulation/increased activity  -AC           Bed Mobility    Rolling Left [place] (Bed Mobility)  minimum assist (75% patient effort);verbal cues;nonverbal cues (demo/gesture)  -AC        Rolling Right Wheeler (Bed Mobility)  minimum assist (75% patient effort);verbal cues;nonverbal cues (demo/gesture)  -AC        Scooting/Bridging Wheeler (Bed Mobility)  maximum assist (25% patient effort);2 person assist  -AC        Supine-Sit Wheeler (Bed Mobility)  minimum assist (75% patient effort);2 person assist;verbal cues;nonverbal cues (demo/gesture)  -AC        Sit-Supine Wheeler (Bed Mobility)  moderate assist (50% patient effort);2 person assist;verbal cues;nonverbal cues (demo/gesture)  -AC        Assistive Device (Bed Mobility)  bed rails;draw sheet  -AC           Functional Mobility    Functional Mobility- Ind. Level   unable to perform  -AC           Transfers    Sit-Stand [place] (Transfers)  moderate assist (50% patient effort);2 person assist;verbal cues;nonverbal cues (demo/gesture)  -AC        Stand-Sit [place] (Transfers)  moderate assist (50% patient effort);2 person assist;verbal cues;nonverbal cues (demo/gesture)  -AC           Sit-Stand Transfer    Assistive Device (Sit-Stand Transfers)  walker, front-wheeled  -AC           Stand-Sit Transfer    Assistive Device (Stand-Sit Transfers)  walker, front-wheeled  -AC           Safety Issues, Functional Mobility    Impairments Affecting Function (Mobility)  balance;pain;strength  -AC           Upper Body Dressing Assessment/Training    [place] Level (Upper Body Dressing)  don;doff;maximum assist (25% patient effort);verbal cues;nonverbal cues (demo/gesture) LSO  -AC        Position (Upper Body Dressing)  edge of bed sitting  -AC           Lower Body Dressing Assessment/Training    [place] Level (Lower Body Dressing)  don;doff;socks;dependent (less than 25% patient effort)  -AC        Position (Lower Body Dressing)  edge of bed sitting  -AC           BADL Safety/Performance    Impairments, BADL Safety/Performance  balance;pain;strength  -AC           Wound 10/27/20 1509 Right heel    Wound - Properties Group Placement Date: 10/27/20  -GP Placement Time: 1509  -GP Side: Right  -GP Location: heel  -GP    Retired Wound - Properties Group Date first assessed: 10/27/20  -GP Time first assessed: 1509  -GP Side: Right  -GP Location: heel  -GP       Wound 10/28/20 0107 Left anterior foot    Wound - Properties Group Placement Date: 10/28/20  -TR Placement Time: 0107  -TR Side: Left  -TR Orientation: anterior  -TR Location: foot  -TR    Retired Wound - Properties Group Date first assessed: 10/28/20  -TR Time first assessed: 0107  -TR Side: Left  -TR Location: foot  -TR       Wound 10/28/20 1133 lumbar spine Incision    Wound - Properties Group Placement Date: 10/28/20   -HW Placement Time: 1133  -HW Present on Hospital Admission: N  -HW Location: lumbar spine  -HW Primary Wound Type: Incision  -HW    Retired Wound - Properties Group Date first assessed: 10/28/20  -HW Time first assessed: 1133  -HW Present on Hospital Admission: N  -HW Location: lumbar spine  -HW Primary Wound Type: Incision  -HW       Positioning and Restraints    Pre-Treatment Position  in bed  -AC        Post Treatment Position  bed  -AC        In Bed  fowlers;call light within reach;encouraged to call for assist;with PT;side rails up x2  -AC          User Key  (r) = Recorded By, (t) = Taken By, (c) = Cosigned By    Initials Name Effective Dates    AC Laci Randolph, OTR/L, CNT 04/09/19 -     GP Marilou Martinez RN 07/17/20 -     HW Justin Diaz, JB 06/05/20 -     TR Meliza Rucker RN 12/31/19 -          Occupational Therapy Education                 Title: PT OT SLP Therapies (Not Started)     Topic: Occupational Therapy (In Progress)     Point: ADL training (In Progress)     Description:   Instruct learner(s) on proper safety adaptation and remediation techniques during self care or transfers.   Instruct in proper use of assistive devices.              Learning Progress Summary           Patient Acceptance, E, NR by AC at 11/2/2020 1037    Acceptance, E, VU by RICK at 10/29/2020 1150    Acceptance, E, VU by SN at 10/23/2020 1202    Comment: Role of OT, safety with ADL, importance of OOB activites    Acceptance, E, VU by AC at 10/21/2020 1021                   Point: Home exercise program (Done)     Description:   Instruct learner(s) on appropriate technique for monitoring, assisting and/or progressing therapeutic exercises/activities.              Learning Progress Summary           Patient Acceptance, E,TB, VU,DU,NR by CJ at 11/1/2020 1337    Comment: Pt. performed ue exs with c/o's during tx of fatigue!                   Point: Precautions (Done)     Description:   Instruct learner(s) on prescribed  precautions during self-care and functional transfers.              Learning Progress Summary           Patient Acceptance, E,TB, VU,DU,NR by  at 11/1/2020 1337    Comment: Pt. performed ue exs with c/o's during tx of fatigue!    Acceptance, E, VU by JJ at 10/29/2020 1150                   Point: Body mechanics (In Progress)     Description:   Instruct learner(s) on proper positioning and spine alignment during self-care, functional mobility activities and/or exercises.              Learning Progress Summary           Patient Acceptance, E, NR by AC at 11/2/2020 1037    Acceptance, E,TB, VU,DU,NR by  at 11/1/2020 1337    Comment: Pt. performed ue exs with c/o's during tx of fatigue!    Acceptance, E, VU by RICK at 10/29/2020 1150                               User Key     Initials Effective Dates Name Provider Type Discipline     04/09/19 -  Laci Randolph OTR/L, DIONISIO Occupational Therapist OT     08/02/16 -  Irineo Coleman COTA/L Occupational Therapy Assistant OT    J 07/05/20 -  Jen Flores OTR/L Occupational Therapist OT     07/16/20 -  Isabell Jarquin OT Student OT Student OT                  OT Recommendation and Plan  Planned Therapy Interventions (OT): activity tolerance training, adaptive equipment training, BADL retraining, functional balance retraining, occupation/activity based interventions, patient/caregiver education/training, transfer/mobility retraining  Therapy Frequency (OT): 5 times/wk  Plan of Care Review  Plan of Care Reviewed With: patient  Progress: no change  Outcome Summary: OT tx completed.  Pt rolled L and R with Maru.  Supine>sit minAx2, sit>supine modAx2.  Needs maxA to don/doff LSO and socks.  Pt stood twice with modAx2 and rw.  Needs lots of encouragement throughout tx.  Will require continued PT/OT post discharge in SNF setting prior to returning home.  OT to cont POC.  Plan of Care Reviewed With: patient  Outcome Summary: OT tx completed.  Pt rolled L and R with  Maru.  Supine>sit minAx2, sit>supine modAx2.  Needs maxA to don/doff LSO and socks.  Pt stood twice with modAx2 and rw.  Needs lots of encouragement throughout tx.  Will require continued PT/OT post discharge in SNF setting prior to returning home.  OT to cont POC.    Outcome Measures     Row Name 11/02/20 0918 11/01/20 1337          How much help from another is currently needed...    Putting on and taking off regular lower body clothing?  2  -AC  2  -CJ     Bathing (including washing, rinsing, and drying)  2  -AC  2  -CJ     Toileting (which includes using toilet bed pan or urinal)  2  -AC  2  -CJ     Putting on and taking off regular upper body clothing  2  -AC  2  -CJ     Taking care of personal grooming (such as brushing teeth)  4  -AC  4  -CJ     Eating meals  4  -AC  4  -CJ     AM-PAC 6 Clicks Score (OT)  16  -  16  -CJ        Functional Assessment    Outcome Measure Options  AM-PAC 6 Clicks Daily Activity (OT)  -  AM-PAC 6 Clicks Daily Activity (OT)  -       User Key  (r) = Recorded By, (t) = Taken By, (c) = Cosigned By    Initials Name Provider Type     Laci Randolph OTR/L, DIONISIO Occupational Therapist    CJ Irineo Coleman COTA/L Occupational Therapy Assistant          Time Calculation:   Time Calculation- OT     Row Name 11/02/20 1037             Time Calculation- OT    OT Start Time  0918  -      OT Stop Time  1000  -      OT Time Calculation (min)  42 min  -      Total Timed Code Minutes- OT  30 minute(s)  -      OT Received On  11/02/20  -        User Key  (r) = Recorded By, (t) = Taken By, (c) = Cosigned By    Initials Name Provider Type     Laci Randolph OTR/L, DIONISIO Occupational Therapist        Therapy Charges for Today     Code Description Service Date Service Provider Modifiers Qty    [id number] HC OT SELF CARE/MGMT/TRAIN EA 15 MIN 11/2/2020 Laci Randolph OTR/L, DIONISIO GO 2               Laci GALLEGO. SAMY Randolph/L, CNT  11/2/2020

## 2020-11-02 NOTE — PLAN OF CARE
Problem: Adult Inpatient Plan of Care  Goal: Plan of Care Review  Flowsheets (Taken 11/2/2020 1038)  Progress: no change  Plan of Care Reviewed With: patient  Outcome Summary: OT tx completed.  Pt rolled L and R with Maru.  Supine>sit minAx2, sit>supine modAx2.  Needs maxA to don/doff LSO and socks.  Pt stood twice with modAx2 and rw.  Needs lots of encouragement throughout tx.  Will require continued PT/OT post discharge in SNF setting prior to returning home.  OT to cont POC.

## 2020-11-02 NOTE — PLAN OF CARE
Goal Outcome Evaluation:  Medicated x 1 today with norco for c/o pain. Worked with therapy today. Bilateral heels floating off pillows. Aaox4 this shift. Family friend at bedside this afternoon. [facility]. Safety maintained cont to monitor

## 2020-11-02 NOTE — THERAPY TREATMENT NOTE
Acute Care - Physical Therapy Treatment Note  [facility]     Patient Name: Donnell Cain  : 1933  MRN: [id number]  Today's Date: 2020   Onset of Illness/Injury or Date of Surgery: 10/20/20       PT Assessment (last 12 hours)      PT Evaluation and Treatment     Row Name 20 09          Physical Therapy Time and Intention    Subjective Information  complains of;pain  -     Document Type  therapy note (daily note)  -     Mode of Treatment  physical therapy;co-treatment  -     Comment  requires encouragement  -     Row Name 20 09          General Information    Existing Precautions/Restrictions  brace worn when out of bed;fall;spinal  -     Barriers to Rehab  medically complex;physical barrier  -     Row Name 20 09          Pain Scale: Numbers Pre/Post-Treatment    Pretreatment Pain Rating  4/10  -     Posttreatment Pain Rating  10/10 with mobility  -     Pain Location - Orientation  incisional  -     Pain Location  back  -     Pre/Posttreatment Pain Comment  RN gave meds  -     Row Name 20 09          Range of Motion Comprehensive    Comment, General Range of Motion  AAROM RLE, AROM LLE x 10 reps  -[facility] Name 20 09          Bed Mobility    Sidelying-Sit [place] (Bed Mobility)  minimum assist (75% patient effort);2 person assist;verbal cues  [facility]     Sit-Sidelying [place] (Bed Mobility)  moderate assist (50% patient effort);2 person assist;verbal cues  -[facility] Name 20 09          Transfers    Comment (Transfers)  (S) stood x 2, pt very fearful of falling  -     Sit-Stand [place] (Transfers)  moderate assist (50% patient effort);maximum assist (25% patient effort);2 person assist;verbal cues  [facility]     Stand-Sit [place] (Transfers)  moderate assist (50% patient effort);2 person assist;verbal cues  -     Row Name 20 09          Sit-Stand Transfer    Assistive Device (Sit-Stand Transfers)  walker,  front-wheeled  -AH     Row Name             Wound 10/27/20 1509 Right heel    Wound - Properties Group Placement Date: 10/27/20  -GP Placement Time: 1509  -GP Side: Right  -GP Location: heel  -GP    Retired Wound - Properties Group Date first assessed: 10/27/20  -GP Time first assessed: 1509  -GP Side: Right  -GP Location: heel  -GP    Row Name             Wound 10/28/20 0107 Left anterior foot    Wound - Properties Group Placement Date: 10/28/20  -TR Placement Time: 0107  -TR Side: Left  -TR Orientation: anterior  -TR Location: foot  -TR    Retired Wound - Properties Group Date first assessed: 10/28/20  -TR Time first assessed: 0107  -TR Side: Left  -TR Location: foot  -TR    Row Name             Wound 10/28/20 1133 lumbar spine Incision    Wound - Properties Group Placement Date: 10/28/20  -HW Placement Time: 1133  -HW Present on Hospital Admission: N  -HW Location: lumbar spine  -HW Primary Wound Type: Incision  -HW    Retired Wound - Properties Group Date first assessed: 10/28/20  -HW Time first assessed: 1133  -HW Present on Hospital Admission: N  -HW Location: lumbar spine  -HW Primary Wound Type: Incision  -HW    Row Name 11/02/20 0940          Plan of Care Review    Plan of Care Reviewed With  patient  -     Progress  no change  -     Outcome Summary  pt very fearful of falling, anticipates pain, hesitant to move, pt trans to EOB mod-max of 2, mod-max assist to tam LSO, sit-stand mod-max, stood x 2, trans back to bed mod-max of 2, BLE HEP X 10 reps RLE AAROM, LLE AROM, pt would benefit from [facility]  -     Row Name 11/02/20 0940          Positioning and Restraints    Pre-Treatment Position  in bed  -     Post Treatment Position  bed  -     In Bed  fowlers;call light within reach;encouraged to call for assist;exit alarm on;notified nsg;heels elevated  -       User Key  (r) = Recorded By, (t) = Taken By, (c) = Cosigned By    Initials Name Provider Type     Alyse Santos, PTA Physical Therapy  Assistant    GP Marilou Martinez, RN Registered Nurse    Justin Cummins, RN Registered Nurse    Meliza Dupont, RN Registered Nurse        Physical Therapy Education                 Title: PT OT SLP Therapies (Not Started)     Topic: Physical Therapy (In Progress)     Point: Mobility training (In Progress)     Learning Progress Summary           Patient Acceptance, E, NR by  at 10/29/2020 1045    Comment: Educated on LSO brace, tam, doff, purpose    Acceptance, E,TB, VU by  at 10/22/2020 1448    Comment: trans    Acceptance, E, VU by  at 10/21/2020 1032    Comment: Pt educated on PT's role in POC                   Point: Home exercise program (In Progress)     Learning Progress Summary           Patient Acceptance, E,TB,D,H, NR by  at 11/2/2020 1040    Comment: BLE HEP                   Point: Body mechanics (Not Started)     Learner Progress:  Not documented in this visit.          Point: Precautions (In Progress)     Learning Progress Summary           Patient Acceptance, E, NR by SYEDA at 10/29/2020 1045    Comment: Educated on LSO brace, tam, doff, purpose                               User Key     Initials Effective Dates Name Provider Type Discipline     08/02/16 -  Alyse Santos, PTA Physical Therapy Assistant PT     08/02/16 -  Todd Young, PT DPT Physical Therapist PT     09/02/20 -  Nell Garnica, JORI Student PT Student PT              PT Recommendation and Plan     Plan of Care Reviewed With: patient  Progress: no change  Outcome Summary: pt very fearful of falling, anticipates pain, hesitant to move, pt trans to EOB mod-max of 2, mod-max assist to tam LSO, sit-stand mod-max, stood x 2, trans back to bed mod-max of 2, BLE HEP X 10 reps RLE AAROM, LLE AROM, pt would benefit from SNF  Outcome Measures     Row Name 11/02/20 0918 11/01/20 1337          How much help from another is currently needed...    Putting on and taking off regular lower body clothing?  2  -AC  2  -CJ      Bathing (including washing, rinsing, and drying)  2  -AC  2  -CJ     Toileting (which includes using toilet bed pan or urinal)  2  -AC  2  -CJ     Putting on and taking off regular upper body clothing  2  -AC  2  -CJ     Taking care of personal grooming (such as brushing teeth)  4  -AC  4  -CJ     Eating meals  4  -AC  4  -CJ     AM-PAC 6 Clicks Score (OT)  16  -AC  16  -CJ        Functional Assessment    Outcome Measure Options  AM-PAC 6 Clicks Daily Activity (OT)  -AC  AM-PAC 6 Clicks Daily Activity (OT)  -       User Key  (r) = Recorded By, (t) = Taken By, (c) = Cosigned By    Initials Name Provider Type     Laci Randolph, OTR/L, CNT Occupational Therapist    CJ Irineo Coleman, ROCKY/L Occupational Therapy Assistant           Time Calculation:   PT Charges     Row Name 11/02/20 1041             Time Calculation    Start Time  0940  -      Stop Time  1033  -      Time Calculation (min)  53 min  -      PT Non-Billable Time (min)  23 min co-treat  -      PT Received On  11/02/20  -         Time Calculation- PT    Total Timed Code Minutes- PT  30 minute(s)  -         Timed Charges    72725 - PT Therapeutic Exercise Minutes  15  -      46966 - PT Therapeutic Activity Minutes  15  -AH        User Key  (r) = Recorded By, (t) = Taken By, (c) = Cosigned By    Initials Name Provider Type     Alyse Santos PTA Physical Therapy Assistant        Therapy Charges for Today     Code Description Service Date Service Provider Modifiers Qty    12462908511 HC PT THER PROC EA 15 MIN 11/2/2020 Alyse Santos PTA GP 1    75554936591 HC PT THERAPEUTIC ACT EA 15 MIN 11/2/2020 Alyse Santos PTA GP 1          PT G-Codes  Outcome Measure Options: AM-PAC 6 Clicks Daily Activity (OT)  AM-PAC 6 Clicks Score (PT): 9  AM-PAC 6 Clicks Score (OT): 16    Alyse Santos PTA  11/2/2020

## 2020-11-02 NOTE — PLAN OF CARE
Goal Outcome Evaluation:  Plan of Care Reviewed With: patient  Progress: no change  Outcome Summary: Patient c/o pain, prn pain meds given as ordered. Transparent dressing to back CDI. No neuro changes, A&O x4. VSS, Safety maintained.

## 2020-11-03 NOTE — PLAN OF CARE
Problem: Adult Inpatient Plan of Care  Goal: Plan of Care Review  Outcome: Ongoing, Progressing  Flowsheets (Taken 11/3/2020 1500)  Progress: no change  Plan of Care Reviewed With: patient  Outcome Summary: Ntn follow up. Pt reports good appeite today. Regular diet, oral intake avg 62.5% of past four meals. Boost Plus added BID. Encouraged oral intake. Cont to follow for plan of care.

## 2020-11-03 NOTE — PROGRESS NOTES
"    [facility] Medicine Services  INPATIENT PROGRESS NOTE    Patient Name: Donnell Cain  Date of Admission: 10/20/2020  Today's Date: 11/03/20  Length of Stay: 6  Primary Care Physician: No primary care provider on file.    Subjective   Chief Complaint: Follow-up weakness  HPI   Patient resting comfortably lying in bed.  He is alert and oriented x3.  He denies shortness of breath, chest pain or pressure.  He is tolerating room air.  He was unable to work with therapy yesterday afternoon, stating that he needed to \"postpone treatment.\"  Discussed with him the importance of working with therapy and ambulation.  Reports appetite is poor.  He denies nausea, vomiting and abdominal pain.  He has been passing gas but has not had a bowel movement.  Bowel regimen in place.  Awaiting to hear from [place] [facility].    Review of Systems   All pertinent negatives and positives are as above. All other systems have been reviewed and are negative unless otherwise stated.     Objective    Temp:  [97.5 °F (36.4 °C)-98.3 °F (36.8 °C)] 98 °F (36.7 °C)  Heart Rate:  [70-75] 75  Resp:  [16] 16  BP: (115-138)/(59-73) 131/67  Physical Exam  Vitals signs reviewed.   Constitutional:       General: He is not in acute distress.     Appearance: He is not toxic-appearing.      Comments: Lying in bed resting comfortably.  Tolerating room air.  No distress.   HENT:      Head: Normocephalic and atraumatic.      Mouth/Throat:      Mouth: Mucous membranes are moist.      Pharynx: Oropharynx is clear.   Eyes:      Extraocular Movements: Extraocular movements intact.      Conjunctiva/sclera: Conjunctivae normal.      Pupils: Pupils are equal, round, and reactive to light.   Neck:      Musculoskeletal: Normal range of motion and neck supple. No muscular tenderness.   Cardiovascular:      Rate and Rhythm: Normal rate and regular rhythm.      Pulses: Normal pulses.      Heart sounds: No murmur.   Pulmonary:      Effort: " Pulmonary effort is normal. No respiratory distress.      Breath sounds: Normal heart sounds.  No wheezing or rhonchi.   Abdominal:      General: Bowel sounds are normal. There is no distension.      Palpations: Abdomen is soft.      Tenderness: There is no abdominal tenderness.   Genitourinary:     Comments: Urinal with yellow urine  Musculoskeletal: Normal range of motion.         General: No swelling or tenderness.   Skin:     General: Skin is warm and dry.      Findings: No erythema or rash.   Neurological:      General: No focal deficit present.      Mental Status: He is alert.      Cranial Nerves: No cranial nerve deficit.      Motor: No weakness.      Comments: Oriented to person, place, situation, and year.  Psychiatric:         Mood and Affect: Mood normal.         Behavior: Behavior normal.     Results Review:  I have reviewed the labs, radiology results, and diagnostic studies.    Laboratory Data:   Results from last 7 days   Lab Units 10/30/20  0349 10/29/20  0627 10/28/20  2043   WBC 10*3/mm3 14.89* 13.78* 16.83*   HEMOGLOBIN g/dL 11.3* 12.3* 12.2*   HEMATOCRIT % 34.7* 38.5 37.9   PLATELETS 10*3/mm3 142 184 202        Results from last 7 days   Lab Units 10/30/20  0349 10/29/20  0627 10/28/20  2043   SODIUM mmol/L 137 138 136   POTASSIUM mmol/L 3.9 3.7 4.3   CHLORIDE mmol/L 99 96* 100   CO2 mmol/L 31.0* 33.0* 27.0   BUN mg/dL 31* 30* 30*   CREATININE mg/dL 0.89 1.10 0.83   CALCIUM mg/dL 7.8* 8.3* 8.4*   GLUCOSE mg/dL 124* 99 120*     I have reviewed the patient's current medications.     Assessment/Plan     Active Hospital Problems    Diagnosis   • **Back pain   • Gross hematuria   • Inability to walk   • Spinal stenosis, lumbar region, with neurogenic claudication     Added automatically from request for surgery [id number]     • Radiculopathy     Added automatically from request for surgery [id number]     • Cardiac pacemaker   • Chronic combined systolic and diastolic congestive heart failure (CMS/HCC)   •  CAD (coronary artery disease)   • HLD (hyperlipidemia)   • HTN (hypertension)   • Permanent atrial fibrillation (CMS/[facility])     Plan:  1.  Patient presented on 10/20 with a 3-day history of inability to walk.  Patient stated whenever he moves he gets pain in his right lower back/hip area that radiates down his right leg to his toes.  CT of the pelvis showed acute appearing mild to moderate height loss of L5.  Infrarenal aortic aneurysm measuring 3.1 cm.  CT of the lumbar spine showed multilevel degenerative disc, endplate, and facet disease with multilevel foraminal stenosis.  Foraminal narrowing greatest on the right L4/5.  Cardiology was notified while he was in the emergency department regarding holding Xarelto and agreed.  2.  Neurosurgery evaluating patient.  CT Myelogram of lumbar spine on 10/23 demonstrates a rightward coronal degenerative scoliotic deformity causing 2 levels of severe rightward foraminal narrowing.  Lumbar fusion and decompression 10/28 per Dr. Perez.  3.  Delirium has resolved - likely related to prolonged hospitalization, disruption of normal sleep-wake cycle, age, and steroid use.   Chest x-ray negative for acute cardiopulmonary process.  Urinalysis unremarkable.  Leukocytosis likely secondary to steroid use.  He has remained afebrile.    4.  He has a history of chronic combined systolic and diastolic heart failure with last EF on echo in July 2020 noted to be 24%.  Per last cardiology office note on 10/15/2020, patient has refused LifeVest.  He is currently euvolemic.  Continue to monitor strict intake and output.  Daily weights.  Continue Toprol-XL, lisinopril, Lasix, and statin.  5.  Urology evaluated patient for gross hematuria.  He had a Lorenz catheter postoperatively.  Patient was agitated confused and subsequently was tugging on his Lorenz catheter.  He had gross hematuria requiring CBI on 10/29.  It has since resolved and he is voiding per urinal without difficulty.  6.  He has  history of atrial fibrillation status post pacemaker and anticoagulated on Xarelto.  Xarelto held on admission.  INR was 2.19 on admission.  On 10/23 he received FFP for goal of INR less than 1.5 before proceeding with CT Myelogram.  Xarelto was resumed on 10/31.  7.  Hyponatremia - resolved.   8.  Continue Physical and Occupational Therapy.  9.   following for disposition needs.  Patient is awaiting for skilled nursing facility placement.  Referral has been sent to [facility].  Ok for discharge when bed offered.     Discharge Planning: As per attending physician.    Electronically signed by BRYAN Aguilar, 11/03/20, 10:09 CST.    I personally evaluated and examined the patient in conjunction withBRYAN Thomas and agree with the assessment, treatment plan, and disposition of the patient as recorded by her. My history, exam, and further recommendations are:     He said today is a good day.  He had bowel movement.  He stood to go to the bedside commode with maximum assistance of 2.  He is not in any distress  Hemodynamically stable.  Maintaining O2 saturation in the upper 90s in room air  Has not had any episodes of confusion reported late in the afternoon compared to early part of my work week.  Has not had any issues of hematuria or reported bleeding since restarting of Xarelto  Pre-CERT ongoing to [facility].  Electronically signed by Gómez Leon MD, 11/3/2020, 14:08 CST.

## 2020-11-03 NOTE — PLAN OF CARE
Goal Outcome Evaluation:  Plan of Care Reviewed With: patient  Progress: no change  Outcome Summary: Alert. Orientedx3. VSS. Pt reluctant to move and to work with therapy. Voiding. Dressing to back dried drainage. BM regimen started; no results. Pt up in chair this afternoon. Plan to d/c to [place].

## 2020-11-03 NOTE — PLAN OF CARE
Problem: Adult Inpatient Plan of Care  Goal: Plan of Care Review  Recent Flowsheet Documentation  Taken 11/3/2020 1300 by Alyse Santos, PTA  Progress: no change  Plan of Care Reviewed With: patient  Outcome Summary: pt cont to require encouragement to move, pt anticipates pain, pt in chair, sit-stand max assist, moved chair put BSC under pt, pt sat on BSC, stood from BSC max assist of 2, moved BSC out and put bed under pt, pt trans back to bed min of 2, pt will need placement

## 2020-11-03 NOTE — PLAN OF CARE
Goal Outcome Evaluation:  Plan of Care Reviewed With: patient  Progress: no change  Outcome Summary: Patient c/o pain, prn tylenol given as ordered. No neuro changes A&O x4. Back incsion YOEL. VSS, Safety maintained.

## 2020-11-03 NOTE — THERAPY TREATMENT NOTE
Acute Care - Physical Therapy Treatment Note  [facility]     Patient Name: Donnell Cain  : 1933  MRN: [id number]  Today's Date: 11/3/2020   Onset of Illness/Injury or Date of Surgery: 10/20/20       PT Assessment (last 12 hours)      PT Evaluation and Treatment     [facility] Name 20 1300 20 0948       Physical Therapy Time and Intention    Subjective Information  (S) complains of;weakness;fatigue;pain  -  --  -    Document Type  therapy note (daily note)  -  --    Mode of Treatment  physical therapy  -  --    Comment, Session Not Performed  --  pt still working on breakfast, will check back later  -    Comment  cont to require encouragement for movement  -  --    [facility] Name 20 1300          General Information    Existing Precautions/Restrictions  brace worn when out of bed;fall;spinal  -     Barriers to Rehab  medically complex  -[facility] Name 20 1300          Pain Scale: Word Pre/Post-Treatment    Pain: Word Scale, Pretreatment  4 - moderate pain  -     Posttreatment Pain Rating  6 - moderate-severe pain  -     Pain Location - Orientation  generalized  -     Pain Intervention(s)  Repositioned  -[facility] Name 20 1300          Bed Mobility    Sidelying-Sit [place] (Bed Mobility)  -- chair  -     Sit-Sidelying [place] (Bed Mobility)  minimum assist (75% patient effort);verbal cues  -[facility] Name 20 1300          Transfers    Comment (Transfers)  stood x 2, stood from chair, put BSC under pt sat on BSC, stood moved BSC and put bed under pt  [facility]     Sit-Stand [place] (Transfers)  maximum assist (25% patient effort);2 person assist;verbal cues  [facility]     Stand-Sit [place] (Transfers)  moderate assist (50% patient effort);2 person assist;verbal cues  -[facility] Name             Wound 10/27/20 1509 Right heel    Wound - Properties Group Placement Date: 10/27/20  -GP Placement Time:   -GP Side: Right  -GP Location: heel  -GP     Retired Wound - Properties Group Date first assessed: 10/27/20  -GP Time first assessed: 1509  -GP Side: Right  -GP Location: heel  -GP    Row Name             Wound 10/28/20 0107 Left anterior foot    Wound - Properties Group Placement Date: 10/28/20  -TR Placement Time: 0107  -TR Side: Left  -TR Orientation: anterior  -TR Location: foot  -TR    Retired Wound - Properties Group Date first assessed: 10/28/20  -TR Time first assessed: 0107  -TR Side: Left  -TR Location: foot  -TR    Row Name             Wound 10/28/20 1133 lumbar spine Incision    Wound - Properties Group Placement Date: 10/28/20  -HW Placement Time: 1133  -HW Present on Hospital Admission: N  -HW Location: lumbar spine  -HW Primary Wound Type: Incision  -HW    Retired Wound - Properties Group Date first assessed: 10/28/20  -HW Time first assessed: 1133  -HW Present on Hospital Admission: N  -HW Location: lumbar spine  -HW Primary Wound Type: Incision  -HW    Row Name 11/03/20 1300          Plan of Care Review    Plan of Care Reviewed With  patient  -     Progress  no change  -     Outcome Summary  pt cont to require encouragement to move, pt anticipates pain, pt in chair, sit-stand max assist, moved chair put BSC under pt, pt sat on BSC, stood from BSC max assist of 2, moved BSC out and put bed under pt, pt trans back to bed min of 2, pt will need placement  -     Row Name 11/03/20 1300          Positioning and Restraints    Pre-Treatment Position  sitting in chair/recliner  -     Post Treatment Position  bed  -     In Bed  fowlers;call light within reach;encouraged to call for assist;exit alarm on;notified nsg;heels elevated  -       User Key  (r) = Recorded By, (t) = Taken By, (c) = Cosigned By    Initials Name Provider Type     Alyse Santos PTA Physical Therapy Assistant    GP Marilou Martinez, RN Registered Nurse    Justin Cummins, RN Registered Nurse    Meliza Dupont, RN Registered Nurse        Physical Therapy Education                  Title: PT OT SLP Therapies (Not Started)     Topic: Physical Therapy (In Progress)     Point: Mobility training (In Progress)     Learning Progress Summary           Patient Acceptance, E, NR by SYEDA at 10/29/2020 1045    Comment: Educated on LSO brace, tam, doff, purpose    Acceptance, E,TB, VU by  at 10/22/2020 1448    Comment: trans    Acceptance, E, VU by CC at 10/21/2020 1032    Comment: Pt educated on PT's role in POC                   Point: Home exercise program (In Progress)     Learning Progress Summary           Patient Acceptance, E,TB,D,H, NR by  at 11/2/2020 1040    Comment: BLE HEP                   Point: Body mechanics (Not Started)     Learner Progress:  Not documented in this visit.          Point: Precautions (In Progress)     Learning Progress Summary           Patient Acceptance, E,TB, NR by  at 11/3/2020 1345    Comment: benefits of activity    Acceptance, E, NR by SYEDA at 10/29/2020 1045    Comment: Educated on LSO brace, lucas turcios, purpose                               User Key     Initials Effective Dates Name Provider Type Discipline     08/02/16 -  Alyse Santos, PTA Physical Therapy Assistant PT     08/02/16 -  Todd Young, PT DPT Physical Therapist PT     09/02/20 -  Nell Garnica, PT Student PT Student PT              PT Recommendation and Plan     Plan of Care Reviewed With: patient  Progress: no change  Outcome Summary: pt cont to require encouragement to move, pt anticipates pain, pt in chair, sit-stand max assist, moved chair put BSC under pt, pt sat on BSC, stood from BSC max assist of 2, moved BSC out and put bed under pt, pt trans back to bed min of 2, pt will need placement  Outcome Measures     Row Name 11/02/20 0918 11/01/20 1337          How much help from another is currently needed...    Putting on and taking off regular lower body clothing?  2  -AC  2  -CJ     Bathing (including washing, rinsing, and drying)  2  -AC  2  -CJ      Toileting (which includes using toilet bed pan or urinal)  2  -AC  2  -CJ     Putting on and taking off regular upper body clothing  2  -AC  2  -CJ     Taking care of personal grooming (such as brushing teeth)  4  -AC  4  -CJ     Eating meals  4  -AC  4  -CJ     AM-PAC 6 Clicks Score (OT)  16  -AC  16  -CJ        Functional Assessment    Outcome Measure Options  AM-PAC 6 Clicks Daily Activity (OT)  -AC  AM-PAC 6 Clicks Daily Activity (OT)  -       User Key  (r) = Recorded By, (t) = Taken By, (c) = Cosigned By    Initials Name Provider Type    AC Laci Randolph N, OTR/L, CNT Occupational Therapist    CJ Irineo Coleman, HIDALGO/L Occupational Therapy Assistant           Time Calculation:   PT Charges     Row Name 11/03/20 1345             Time Calculation    Start Time  1300  -AH      Stop Time  1340  -      Time Calculation (min)  40 min  -AH      PT Non-Billable Time (min)  10 min  -AH         Time Calculation- PT    Total Timed Code Minutes- PT  30 minute(s)  -AH         Timed Charges    47767 - PT Therapeutic Activity Minutes  30  -AH        User Key  (r) = Recorded By, (t) = Taken By, (c) = Cosigned By    Initials Name Provider Type     Alyse Santos PTA Physical Therapy Assistant        Therapy Charges for Today     Code Description Service Date Service Provider Modifiers Qty    55940201485 HC PT THER PROC EA 15 MIN 11/2/2020 Alyse Santos, PTA GP 1    86354162860 HC PT THERAPEUTIC ACT EA 15 MIN 11/2/2020 Alyse Santos, ZAHRA GP 1    00616620683 HC PT THERAPEUTIC ACT EA 15 MIN 11/3/2020 Alyse Santos, ZAHRA GP 2          PT G-Codes  Outcome Measure Options: AM-PAC 6 Clicks Daily Activity (OT)  AM-PAC 6 Clicks Score (PT): 9  AM-PAC 6 Clicks Score (OT): 16    Alyse Santos PTA  11/3/2020

## 2020-11-03 NOTE — THERAPY TREATMENT NOTE
Acute Care - Occupational Therapy Treatment Note  [facility]     Patient Name: Donnell Cain  : 1933  MRN: [id number]  Today's Date: 11/3/2020  Onset of Illness/Injury or Date of Surgery: 10/20/20  Date of Referral to OT: 10/20/20  Referring Physician: Juan Jose ADAMS    Admit Date: 10/20/2020       ICD-10-CM ICD-9-CM   1. Inability to walk  R26.2 719.7   2. Decreased activities of daily living (ADL)  Z78.9 V49.89   3. Impaired functional mobility, balance, gait, and endurance  Z74.09 V49.89   4. Radiculopathy, unspecified spinal region  M54.10 729.2   5. Low back pain, unspecified back pain laterality, unspecified chronicity, unspecified whether sciatica present  M54.5 724.2   6. Spinal stenosis, lumbar region, with neurogenic claudication  M48.062 724.03     Patient Active Problem List   Diagnosis   • CAD (coronary artery disease)   • Chronic combined systolic and diastolic congestive heart failure (CMS/HCC)   • SSS (sick sinus syndrome) (CMS/HCC)   • Permanent atrial fibrillation (CMS/HCC)   • HLD (hyperlipidemia)   • HTN (hypertension)   • Murmur   • Cardiac pacemaker   • Hyponatremia   • Weakness   • Current use of long term anticoagulation   • Inability to walk   • Back pain   • Spinal stenosis, lumbar region, with neurogenic claudication   • Radiculopathy   • Gross hematuria     Past Medical History:   Diagnosis Date   • A-fib (CMS/HCC)    • Atherosclerosis of coronary artery bypass graft    • Atrial fibrillation (CMS/HCC)    • CAD (coronary artery disease)    • Cardiac pacemaker    • CHF (congestive heart failure) (CMS/HCC)    • Congestive heart failure (CMS/HCC)    • HLD (hyperlipidemia)    • HTN (hypertension)    • Ischemic cardiomyopathy    • MI, old    • Myocardial infarction (CMS/HCC)    • SSS (sick sinus syndrome) (CMS/HCC)      Past Surgical History:   Procedure Laterality Date   • APPENDECTOMY     • CARDIAC PACEMAKER PLACEMENT     • CORONARY ARTERY BYPASS GRAFT     • LUMBAR LAMINECTOMY  WITH FUSION Right 10/28/2020    Procedure: LUMBAR discectomy, LAMINECTOMY TRANSFORAMINAL LUMBAR INTERBODY FUSION RIGHT L2-3,3-4,4-5;  Surgeon: Edwardo Perez MD;  Location[facility] OR;  Service: Neurosurgery;  Laterality: Right;   • PACEMAKER IMPLANTATION      x 3    • TONSILLECTOMY            OT ASSESSMENT FLOWSHEET (last 12 hours)      OT Evaluation and Treatment     Row Name 11/03/20 1136                   OT Time and Intention    Subjective Information  complains of;weakness;fatigue;pain  -TS        Document Type  therapy note (daily note)  -TS        Mode of Treatment  occupational therapy  -TS        Patient Effort  fair  -TS        Comment  pt requires encouragement for movement throughout tx. Pt provided education throughout tx on importance/benefits of movement  -TS           General Information    Existing Precautions/Restrictions  brace worn when out of bed;fall;spinal  -TS           Cognition    Personal Safety Interventions  fall prevention program maintained;gait belt;nonskid shoes/slippers when out of bed  -TS           Pain Scale: Word Pre/Post-Treatment    Pain: Word Scale, Pretreatment  4 - moderate pain  -TS        Posttreatment Pain Rating  6 - moderate-severe pain  -TS        Pain Location - Orientation  generalized  -TS           Bed Mobility    Rolling Right [place] (Bed Mobility)  minimum assist (75% patient effort)  -TS        Sidelying-Sit [place] (Bed Mobility)  minimum assist (75% patient effort);moderate assist (50% patient effort)  -TS           Functional Mobility    Functional Mobility- Ind. Level  minimum assist (75% patient effort);moderate assist (50% patient effort);2 person assist required  -TS        Functional Mobility- Device  rolling walker  -TS        Functional Mobility- Comment  2-3 steps from EOB to recliner  -TS           Transfer Assessment/Treatment    Transfers  sit-stand transfer;stand-sit transfer  -TS           Transfers    Sit-Stand [place]  (Transfers)  minimum assist (75% patient effort);moderate assist (50% patient effort);2 person assist  -TS        Stand-Sit [place] (Transfers)  moderate assist (50% patient effort);2 person assist  -TS           Sit-Stand Transfer    Assistive Device (Sit-Stand Transfers)  walker, front-wheeled  -TS           Stand-Sit Transfer    Assistive Device (Stand-Sit Transfers)  walker, front-wheeled  -TS           Activities of Daily Living    BADL Assessment/Intervention  upper body dressing;lower body dressing  -TS           Upper Body Dressing Assessment/Training    [place] Level (Upper Body Dressing)  don;maximum assist (25% patient effort)  -TS        Position (Upper Body Dressing)  edge of bed sitting  -TS        Comment (Upper Body Dressing)  LSO  -TS           Lower Body Dressing Assessment/Training    [place] Level (Lower Body Dressing)  don;maximum assist (25% patient effort)  -TS        Position (Lower Body Dressing)  edge of bed sitting;supported standing  -TS        Comment (Lower Body Dressing)  brief  -TS           Wound 10/27/20 1509 Right heel    Wound - Properties Group Placement Date: 10/27/20  -GP Placement Time: 1509  -GP Side: Right  -GP Location: heel  -GP    Retired Wound - Properties Group Date first assessed: 10/27/20  -GP Time first assessed: 1509  -GP Side: Right  -GP Location: heel  -GP       Wound 10/28/20 0107 Left anterior foot    Wound - Properties Group Placement Date: 10/28/20  -TR Placement Time: 0107  -TR Side: Left  -TR Orientation: anterior  -TR Location: foot  -TR    Retired Wound - Properties Group Date first assessed: 10/28/20  -TR Time first assessed: 0107  -TR Side: Left  -TR Location: foot  -TR       Wound 10/28/20 1133 lumbar spine Incision    Wound - Properties Group Placement Date: 10/28/20  -HW Placement Time: 1133  -HW Present on Hospital Admission: N  -HW Location: lumbar spine  -HW Primary Wound Type: Incision  -HW    Retired Wound - Properties Group Date  first assessed: 10/28/20  -HW Time first assessed: 1133  -HW Present on Hospital Admission: N  -HW Location: lumbar spine  -HW Primary Wound Type: Incision  -HW       Positioning and Restraints    Pre-Treatment Position  in bed  -TS        Post Treatment Position  chair  -TS        In Chair  sitting;call light within reach;encouraged to call for assist;notified nsg;with brace  -TS          User Key  (r) = Recorded By, (t) = Taken By, (c) = Cosigned By    Initials Name Effective Dates    TS Jovana Lundberg COTA/QUINCY 08/02/16 -     GP Marilou Martinez RN 07/17/20 -     HW Justin Diaz RN 06/05/20 -     TR Meliza Rucker RN 12/31/19 -          Occupational Therapy Education                 Title: PT OT SLP Therapies (Not Started)     Topic: Occupational Therapy (In Progress)     Point: ADL training (In Progress)     Description:   Instruct learner(s) on proper safety adaptation and remediation techniques during self care or transfers.   Instruct in proper use of assistive devices.              Learning Progress Summary           Patient Acceptance, E, NR by  at 11/2/2020 1037    Acceptance, E, VU by RICK at 10/29/2020 1150    Acceptance, E, VU by  at 10/23/2020 1202    Comment: Role of OT, safety with ADL, importance of OOB activites    Acceptance, E, VU by  at 10/21/2020 1021                   Point: Home exercise program (Done)     Description:   Instruct learner(s) on appropriate technique for monitoring, assisting and/or progressing therapeutic exercises/activities.              Learning Progress Summary           Patient Acceptance, E,TB, VU,DU,NR by  at 11/1/2020 1337    Comment: Pt. performed ue exs with c/o's during tx of fatigue!                   Point: Precautions (Done)     Description:   Instruct learner(s) on prescribed precautions during self-care and functional transfers.              Learning Progress Summary           Patient Acceptance, E,TB, VU,DU,NR by  at 11/1/2020 1337    Comment:  Pt. performed ue exs with c/o's during tx of fatigue!    Acceptance, E, VU by  at 10/29/2020 1150                   Point: Body mechanics (In Progress)     Description:   Instruct learner(s) on proper positioning and spine alignment during self-care, functional mobility activities and/or exercises.              Learning Progress Summary           Patient Acceptance, E, NR by  at 11/2/2020 1037    Acceptance, E,TB, VU,DU,NR by  at 11/1/2020 1337    Comment: Pt. performed ue exs with c/o's during tx of fatigue!    Acceptance, E, VU by J at 10/29/2020 1150                               User Key     Initials Effective Dates Name Provider Type Discipline     04/09/19 -  Laci Randolph, OTR/L, DIONISIO Occupational Therapist OT     08/02/16 -  Irineo Coleman COTA/L Occupational Therapy Assistant OT     07/05/20 -  Jen Flores OTR/L Occupational Therapist OT     07/16/20 -  Isabell Jarquin OT Student OT Student OT                  OT Recommendation and Plan          Outcome Measures     Row Name 11/02/20 0918 11/01/20 1337          How much help from another is currently needed...    Putting on and taking off regular lower body clothing?  2  -AC  2  -CJ     Bathing (including washing, rinsing, and drying)  2  -AC  2  -CJ     Toileting (which includes using toilet bed pan or urinal)  2  -AC  2  -CJ     Putting on and taking off regular upper body clothing  2  -AC  2  -CJ     Taking care of personal grooming (such as brushing teeth)  4  -AC  4  -CJ     Eating meals  4  -AC  4  -CJ     AM-PAC 6 Clicks Score (OT)  16  -  16  -        Functional Assessment    Outcome Measure Options  AM-PAC 6 Clicks Daily Activity (OT)  -  AM-PAC 6 Clicks Daily Activity (OT)  -       User Key  (r) = Recorded By, (t) = Taken By, (c) = Cosigned By    Initials Name Provider Type     Laci Randolph, OTR/L, DIONISIO Occupational Therapist     Irineo Coleman COTA/L Occupational Therapy Assistant          Time  Calculation:   Time Calculation- OT     Row Name 11/03/20 1320             Time Calculation- OT    OT Start Time  1136  -TS      OT Stop Time  1150  -TS      OT Time Calculation (min)  14 min  -TS      Total Timed Code Minutes- OT  14 minute(s)  -TS      OT Received On  11/03/20  -TS         Timed Charges    47101 - OT Self Care/Mgmt Minutes  14  -TS        User Key  (r) = Recorded By, (t) = Taken By, (c) = Cosigned By    Initials Name Provider Type     Jovana Lundberg COTA/L Occupational Therapy Assistant        Therapy Charges for Today     Code Description Service Date Service Provider Modifiers Qty    62088936987 HC OT SELF CARE/MGMT/TRAIN EA 15 MIN 11/3/2020 Jovana Lundberg COTA/L GO 1               Jovana GALLEGO. ARTIE Lundberg  11/3/2020

## 2020-11-03 NOTE — PROGRESS NOTES
Continued Stay Note   [facility]     Patient Name: Donnell Cain  MRN: [id number]  Today's Date: 11/3/2020    Admit Date: 10/20/2020    Discharge Plan     Row Name 11/03/20 1145       Plan    Plan  [facility]- Upon ins. precert approval    Patient/Family in Agreement with Plan  yes    Plan Comments  Spoke with [place] from [place] [phone] and they are offering pt a bed there.  They are starting precert and will call upon completion.  Tried to confirm with family and number has been busy for a lenghty period of time- Satish [phone].        Discharge Codes    No documentation.             KATHEIRNE Yan

## 2020-11-03 NOTE — PROGRESS NOTES
"Donnell Cain  87 y.o.      Chief complaint:   Lumbar stenosis status post lumbar fusion, constipation    Subjective  No events    Temp:  [97.5 °F (36.4 °C)-98.3 °F (36.8 °C)] 98 °F (36.7 °C)  Heart Rate:  [70-75] 75  Resp:  [16] 16  BP: (115-138)/(59-73) 131/67      Objective:  General Appearance:  Comfortable, well-appearing, in no acute distress and not in pain.    Vital signs: (most recent): Blood pressure 131/67, pulse 75, temperature 98 °F (36.7 °C), temperature source Oral, resp. rate 16, height 188 cm (74.02\"), weight 83.5 kg (184 lb), SpO2 100 %.  Vital signs are normal.  No fever.    Output: Producing urine.    HEENT: Normal HEENT exam.    Lungs:  Normal effort and normal respiratory rate.  He is not in respiratory distress.    Heart: Normal rate.  Regular rhythm.    Chest: Symmetric chest wall expansion.   Skin:  Warm and dry.    Abdomen: Abdomen is non-distended.    Pulses: Distal pulses are intact.        Neurologic Exam     Mental Status   Oriented to person, place, and time.   Oriented to person.   Oriented to place.   Attention: normal. Concentration: normal.   Speech: speech is normal   Level of consciousness: alert  Knowledge: good.   Normal comprehension.   Confusion improving     Cranial Nerves   Cranial nerves II through XII intact.     CN II   Visual fields full to confrontation.     CN III, IV, VI   Pupils are equal, round, and reactive to light.  Extraocular motions are normal.     CN V   Facial sensation intact.     CN VII   Facial expression full, symmetric.     CN VIII   CN VIII normal.     CN IX, X   CN IX normal.   CN X normal.     CN XI   CN XI normal.     CN XII   CN XII normal.     Motor Exam   Muscle bulk: normal  Overall muscle tone: normal  Right arm pronator drift: absent  Left arm pronator drift: absent    Strength   Strength 5/5 except as noted. Right psoas and quadriceps 2 out of 5  Right hamstring 4- out of 5     Sensory Exam   Light touch normal.   Pinprick normal. "     Gait, Coordination, and Reflexes     Coordination   Finger to nose coordination: normal    Tremor   Resting tremor: absent  Intention tremor: absent  Action tremor: absent    Reflexes   Reflexes 2+ except as noted.         Back pain    CAD (coronary artery disease)    Chronic combined systolic and diastolic congestive heart failure (CMS/HCC)    Permanent atrial fibrillation (CMS/HCC)    HLD (hyperlipidemia)    HTN (hypertension)    Cardiac pacemaker    Inability to walk    Spinal stenosis, lumbar region, with neurogenic claudication    Radiculopathy    Gross hematuria      Lab Results (last 24 hours)     ** No results found for the last 24 hours. **              Plan:   Continue physical and Occupational Therapy.  Waiting for rehab placement.  Will place medication for patient to help with bowel movement    Juan Jose Gilmore, APRN

## 2020-11-04 NOTE — PLAN OF CARE
Problem: Adult Inpatient Plan of Care  Goal: Plan of Care Review  Recent Flowsheet Documentation  Taken 11/4/2020 1400 by Alyse Santos, PTA  Progress: no change  Plan of Care Reviewed With: patient  Outcome Summary: pt trans to EOB min-mod of 2, tam LSO max assist, sit-stand mod-max of 2, stand pivot bed-BSC, pt sat on BSC had a large BM, stood mod-max of 2, pulled BSC away and put chair under pt, pt sat in chair min-mod of 2, pt will need placement

## 2020-11-04 NOTE — THERAPY TREATMENT NOTE
Acute Care - Occupational Therapy Treatment Note  Psychiatric     Patient Name: Donnell Cain  : 1933  MRN: [id number]  Today's Date: 2020  Onset of Illness/Injury or Date of Surgery: 10/20/20  Date of Referral to OT: 10/20/20  Referring Physician: Juan Jose ADAMS    Admit Date: 10/20/2020       ICD-10-CM ICD-9-CM   1. Inability to walk  R26.2 719.7   2. Decreased activities of daily living (ADL)  Z78.9 V49.89   3. Impaired functional mobility, balance, gait, and endurance  Z74.09 V49.89   4. Radiculopathy, unspecified spinal region  M54.10 729.2   5. Low back pain, unspecified back pain laterality, unspecified chronicity, unspecified whether sciatica present  M54.5 724.2   6. Spinal stenosis, lumbar region, with neurogenic claudication  M48.062 724.03     Patient Active Problem List   Diagnosis   • CAD (coronary artery disease)   • Chronic combined systolic and diastolic congestive heart failure (CMS/HCC)   • SSS (sick sinus syndrome) (CMS/HCC)   • Permanent atrial fibrillation (CMS/HCC)   • HLD (hyperlipidemia)   • HTN (hypertension)   • Murmur   • Cardiac pacemaker   • Hyponatremia   • Weakness   • Current use of long term anticoagulation   • Inability to walk   • Back pain   • Spinal stenosis, lumbar region, with neurogenic claudication   • Radiculopathy   • Gross hematuria     Past Medical History:   Diagnosis Date   • A-fib (CMS/HCC)    • Atherosclerosis of coronary artery bypass graft    • Atrial fibrillation (CMS/HCC)    • CAD (coronary artery disease)    • Cardiac pacemaker    • CHF (congestive heart failure) (CMS/HCC)    • Congestive heart failure (CMS/HCC)    • HLD (hyperlipidemia)    • HTN (hypertension)    • Ischemic cardiomyopathy    • MI, old    • Myocardial infarction (CMS/HCC)    • SSS (sick sinus syndrome) (CMS/HCC)      Past Surgical History:   Procedure Laterality Date   • APPENDECTOMY     • CARDIAC PACEMAKER PLACEMENT     • CORONARY ARTERY BYPASS GRAFT     • LUMBAR LAMINECTOMY  WITH FUSION Right 10/28/2020    Procedure: LUMBAR discectomy, LAMINECTOMY TRANSFORAMINAL LUMBAR INTERBODY FUSION RIGHT L2-3,3-4,4-5;  Surgeon: Edwardo Perez MD;  Location: [facility];  Service: Neurosurgery;  Laterality: Right;   • PACEMAKER IMPLANTATION      x 3    • TONSILLECTOMY            OT ASSESSMENT FLOWSHEET (last 12 hours)      OT Evaluation and Treatment     Row Name 11/04/20 1401                   OT Time and Intention    Subjective Information  complains of;weakness;fatigue;pain  -TS        Document Type  therapy note (daily note)  -TS        Mode of Treatment  occupational therapy  -TS        Patient Effort  fair  -TS        Comment  Requires encouragement continuously throughout tx   -TS           General Information    Existing Precautions/Restrictions  brace worn when out of bed;fall;spinal weak RLE  -TS           Cognition    Personal Safety Interventions  fall prevention program maintained;gait belt;nonskid shoes/slippers when out of bed  -TS           Pain Scale: Numbers Pre/Post-Treatment    Pretreatment Pain Rating  10/10  -TS        Posttreatment Pain Rating  4/10  -TS        Pain Location  back  -TS        Pain Intervention(s)  Repositioned;Ambulation/increased activity  -TS           Bed Mobility    Sidelying-Sit [place] (Bed Mobility)  minimum assist (75% patient effort);moderate assist (50% patient effort);2 person assist;verbal cues  -TS           Functional Mobility    Functional Mobility- Ind. Level  minimum assist (75% patient effort);moderate assist (50% patient effort)  -TS        Functional Mobility- Device  rolling walker  -TS        Functional Mobility- Comment  EOB to BSC  -TS           Transfer Assessment/Treatment    Transfers  sit-stand transfer;stand-sit transfer;toilet transfer  -TS           Transfers    Sit-Stand [place] (Transfers)  maximum assist (25% patient effort);2 person assist;verbal cues  -TS        Stand-Sit [place] (Transfers)  moderate assist  (50% patient effort);2 person assist;verbal cues  -TS        [place] Level (Toilet Transfer)  moderate assist (50% patient effort);maximum assist (25% patient effort);2 person assist  -TS        Assistive Device (Toilet Transfer)  commode, bedside without drop arms;walker, front-wheeled  -TS           Sit-Stand Transfer    Assistive Device (Sit-Stand Transfers)  walker, front-wheeled  -TS           Stand-Sit Transfer    Assistive Device (Stand-Sit Transfers)  walker, front-wheeled  -TS           Stand Pivot/Stand Step Transfer    Stand Pivot/Stand Step [place] (Transfers)  moderate assist (50% patient effort);maximum assist (25% patient effort);2 person assist;verbal cues  -TS        Assistive Device (Stand Pivot Stand Step Transfer)  walker, front-wheeled  -TS           Toilet Transfer    Type (Toilet Transfer)  sit-stand;stand-sit  -TS           Activities of Daily Living    BADL Assessment/Intervention  lower body dressing;toileting  -TS           Lower Body Dressing Assessment/Training    [place] Level (Lower Body Dressing)  don;socks;pants/bottoms;maximum assist (25% patient effort);dependent (less than 25% patient effort) x2  -TS        Position (Lower Body Dressing)  edge of bed sitting;supported standing  -TS           Toileting Assessment/Training    [place] Level (Toileting)  toileting skills;standby assist;perform perineal hygiene;maximum assist (25% patient effort) x2  -TS        Assistive Devices (Toileting)  commode, bedside without drop arms  -TS        Position (Toileting)  supported sitting;supported standing  -TS           Wound 10/27/20 1509 Right heel    Wound - Properties Group Placement Date: 10/27/20  -GP Placement Time: 1509  -GP Side: Right  -GP Location: heel  -GP    Retired Wound - Properties Group Date first assessed: 10/27/20  -GP Time first assessed: 1509  -GP Side: Right  -GP Location: heel  -GP       Wound 10/28/20 0107 Left anterior foot    Wound - Properties  Group Placement Date: 10/28/20  -TR Placement Time: 0107 -TR Side: Left  -TR Orientation: anterior  -TR Location: foot  -TR    Retired Wound - Properties Group Date first assessed: 10/28/20  -TR Time first assessed: 0107  -TR Side: Left  -TR Location: foot  -TR       Wound 10/28/20 1133 lumbar spine Incision    Wound - Properties Group Placement Date: 10/28/20  -HW Placement Time: 1133  -HW Present on Hospital Admission: N  -HW Location: lumbar spine  -HW Primary Wound Type: Incision  -HW    Retired Wound - Properties Group Date first assessed: 10/28/20  -HW Time first assessed: 1133  -HW Present on Hospital Admission: N  -HW Location: lumbar spine  -HW Primary Wound Type: Incision  -HW       Positioning and Restraints    Pre-Treatment Position  in bed  -TS        Post Treatment Position  chair  -TS        In Chair  notified nsg;call light within reach;encouraged to call for assist;with brace  -TS          User Key  (r) = Recorded By, (t) = Taken By, (c) = Cosigned By    Initials Name Effective Dates    TS Jovana Lundberg COTA/QUINCY 08/02/16 -     GP Marilou Martinez RN 07/17/20 -     HW Justin Diaz RN 06/05/20 -     TR Meliza Rucker RN 12/31/19 -          Occupational Therapy Education                 Title: PT OT SLP Therapies (Not Started)     Topic: Occupational Therapy (In Progress)     Point: ADL training (In Progress)     Description:   Instruct learner(s) on proper safety adaptation and remediation techniques during self care or transfers.   Instruct in proper use of assistive devices.              Learning Progress Summary           Patient Acceptance, E, NR by FIFI at 11/2/2020 1037    Acceptance, E, VU by RICK at 10/29/2020 1150    Acceptance, E, VU by  at 10/23/2020 1202    Comment: Role of OT, safety with ADL, importance of OOB activites    Acceptance, E, VU by FIFI at 10/21/2020 1021                   Point: Home exercise program (Done)     Description:   Instruct learner(s) on appropriate technique  for monitoring, assisting and/or progressing therapeutic exercises/activities.              Learning Progress Summary           Patient Acceptance, E,TB, VU,DU,NR by  at 11/1/2020 1337    Comment: Pt. performed ue exs with c/o's during tx of fatigue!                   Point: Precautions (Done)     Description:   Instruct learner(s) on prescribed precautions during self-care and functional transfers.              Learning Progress Summary           Patient Acceptance, E,TB, VU,DU,NR by  at 11/1/2020 1337    Comment: Pt. performed ue exs with c/o's during tx of fatigue!    Acceptance, E, VU by JLES at 10/29/2020 1150                   Point: Body mechanics (In Progress)     Description:   Instruct learner(s) on proper positioning and spine alignment during self-care, functional mobility activities and/or exercises.              Learning Progress Summary           Patient Acceptance, E, NR by  at 11/2/2020 1037    Acceptance, E,TB, VU,DU,NR by  at 11/1/2020 1337    Comment: Pt. performed ue exs with c/o's during tx of fatigue!    Acceptance, E, VU by  at 10/29/2020 1150                               User Key     Initials Effective Dates Name Provider Type Discipline     04/09/19 -  Laci Randolph OTR/L, CNT Occupational Therapist OT     08/02/16 -  Irineo Coleman COTA/L Occupational Therapy Assistant OT     07/05/20 -  Jen Flores OTR/L Occupational Therapist OT     07/16/20 -  Isabell Jarquin OT Student OT Student OT                  OT Recommendation and Plan          Outcome Measures     Row Name 11/04/20 1500 11/02/20 0918          How much help from another is currently needed...    Putting on and taking off regular lower body clothing?  2  -TS  2  -AC     Bathing (including washing, rinsing, and drying)  2  -TS  2  -AC     Toileting (which includes using toilet bed pan or urinal)  2  -TS  2  -AC     Putting on and taking off regular upper body clothing  2  -TS  2  -AC     Taking care  of personal grooming (such as brushing teeth)  4  -TS  4  -AC     Eating meals  4  -TS  4  -AC     AM-PAC 6 Clicks Score (OT)  16  -TS  16  -AC        Functional Assessment    Outcome Measure Options  --  AM-PAC 6 Clicks Daily Activity (OT)  -AC       User Key  (r) = Recorded By, (t) = Taken By, (c) = Cosigned By    Initials Name Provider Type    AC Laci Randolph, OTR/L, DIONISIO Occupational Therapist    TS Jovana Lundberg HIDALGO/L Occupational Therapy Assistant          Time Calculation:   Time Calculation- OT     Row Name 11/04/20 1524             Time Calculation- OT    OT Start Time  1401  -TS      OT Stop Time  1440  -TS      OT Time Calculation (min)  39 min  -TS      Total Timed Code Minutes- OT  23 minute(s)  -TS      OT Non-Billable Time (min)  16 min  -TS      OT Received On  11/04/20  -TS         Timed Charges    81584 - OT Self Care/Mgmt Minutes  23  -TS        User Key  (r) = Recorded By, (t) = Taken By, (c) = Cosigned By    Initials Name Provider Type     Jovana Lundberg HIDALGO/L Occupational Therapy Assistant        Therapy Charges for Today     Code Description Service Date Service Provider Modifiers Qty    17551763209 HC OT SELF CARE/MGMT/TRAIN EA 15 MIN 11/3/2020 Jovana Lundberg COTA/L GO 1    04720811177 HC OT SELF CARE/MGMT/TRAIN EA 15 MIN 11/4/2020 Jovana Lundberg HIDALGO/L GO 2               Jovana GALLEGO. ROCKY Lundberg/QUINCY  11/4/2020

## 2020-11-04 NOTE — THERAPY TREATMENT NOTE
Acute Care - Physical Therapy Treatment Note  [facility]     Patient Name: Donnell Cain  : 1933  MRN: [id number]  Today's Date: 2020   Onset of Illness/Injury or Date of Surgery: 10/20/20       PT Assessment (last 12 hours)      PT Evaluation and Treatment     Row Name 20 1400 20 0900       Physical Therapy Time and Intention    Subjective Information  complains of;weakness;fatigue;pain  -  --    Document Type  therapy note (daily note)  -  --    Mode of Treatment  physical therapy  -  --    Comment, Session Not Performed  --  eating breakfast  -    Comment  (S) REQUIRES ENCOURAGEMENT  -  --    Row Name 20 1400          General Information    Existing Precautions/Restrictions  brace worn when out of bed;fall;spinal  -[facility] Name 20 1400          Pain Scale: Numbers Pre/Post-Treatment    Pretreatment Pain Rating  10/10  -     Posttreatment Pain Rating  4/10  -     Pain Location  back  -     Row Name 20 1400          Pain Scale: Word Pre/Post-Treatment    Pain: Word Scale, Pretreatment  4 - moderate pain  -     Posttreatment Pain Rating  6 - moderate-severe pain  -     Pain Intervention(s)  Repositioned  -     Row Name 20 1400          Bed Mobility    Sidelying-Sit [place] (Bed Mobility)  minimum assist (75% patient effort);moderate assist (50% patient effort);2 person assist;verbal cues  -     Sit-Sidelying [place] (Bed Mobility)  -- CHAIR  -     Row Name 20 1400          Transfers    Comment (Transfers)  STOOD X 2,   -     Sit-Stand [place] (Transfers)  maximum assist (25% patient effort);2 person assist;verbal cues  -     Stand-Sit [place] (Transfers)  moderate assist (50% patient effort);2 person assist;verbal cues  -     [place] Level (Toilet Transfer)  moderate assist (50% patient effort);maximum assist (25% patient effort);2 person assist  -     Assistive Device (Toilet Transfer)  commode,  bedside without drop arms;walker, front-wheeled  -     Row Name 11/04/20 1400          Stand Pivot/Stand Step Transfer    Stand Pivot/Stand Step [place] (Transfers)  moderate assist (50% patient effort);maximum assist (25% patient effort);2 person assist;verbal cues  [facility]     Assistive Device (Stand Pivot Stand Step Transfer)  walker, front-wheeled  -     Row Name             Wound 10/27/20 1509 Right heel    Wound - Properties Group Placement Date: 10/27/20  -GP Placement Time: 1509  -GP Side: Right  -GP Location: heel  -GP    Retired Wound - Properties Group Date first assessed: 10/27/20  -GP Time first assessed: 1509  -GP Side: Right  -GP Location: heel  -GP    Row Name             Wound 10/28/20 0107 Left anterior foot    Wound - Properties Group Placement Date: 10/28/20  -TR Placement Time: 0107  -TR Side: Left  -TR Orientation: anterior  -TR Location: foot  -TR    Retired Wound - Properties Group Date first assessed: 10/28/20  -TR Time first assessed: 0107  -TR Side: Left  -TR Location: foot  -TR    Row Name             Wound 10/28/20 1133 lumbar spine Incision    Wound - Properties Group Placement Date: 10/28/20  -HW Placement Time: 1133  -HW Present on Hospital Admission: N  -HW Location: lumbar spine  -HW Primary Wound Type: Incision  -HW    Retired Wound - Properties Group Date first assessed: 10/28/20  -HW Time first assessed: 1133  -HW Present on Hospital Admission: N  -HW Location: lumbar spine  -HW Primary Wound Type: Incision  -HW    Row Name 11/04/20 1400          Plan of Care Review    Plan of Care Reviewed With  patient  -     Progress  no change  -     Outcome Summary  pt trans to EOB min-mod of 2, tam LSO max assist, sit-stand mod-max of 2, stand pivot bed-BSC, pt sat on BSC had a large BM, stood mod-max of 2, pulled BSC away and put chair under pt, pt sat in chair min-mod of 2, pt will need placement  -     Row Name 11/04/20 1400          Positioning and Restraints     Pre-Treatment Position  in bed  -     Post Treatment Position  chair  -     In Chair  notified nsg;reclined;call light within reach;encouraged to call for assist  -       User Key  (r) = Recorded By, (t) = Taken By, (c) = Cosigned By    Initials Name Provider Type     Alyse Santos PTA Physical Therapy Assistant    GP Marilou Martinez, RN Registered Nurse    Justin Cummins, RN Registered Nurse    Meliza Dupont RN Registered Nurse        Physical Therapy Education                 Title: PT OT SLP Therapies (Not Started)     Topic: Physical Therapy (In Progress)     Point: Mobility training (In Progress)     Learning Progress Summary           Patient Acceptance, E, NR by  at 10/29/2020 1045    Comment: Educated on LSO brace, tam, doff, purpose    Acceptance, E,TB, VU by  at 10/22/2020 1448    Comment: trans    Acceptance, E, VU by  at 10/21/2020 1032    Comment: Pt educated on PT's role in POC                   Point: Home exercise program (In Progress)     Learning Progress Summary           Patient Acceptance, E,TB,D,H, NR by  at 11/2/2020 1040    Comment: BLE HEP                   Point: Body mechanics (Done)     Learning Progress Summary           Patient Acceptance, E, VU by  at 11/4/2020 0459                   Point: Precautions (Done)     Learning Progress Summary           Patient Acceptance, E,TB, VU by  at 11/4/2020 1445    Comment: LSO    Acceptance, E,TB, NR by  at 11/3/2020 1345    Comment: benefits of activity    Acceptance, E, NR by  at 10/29/2020 1045    Comment: Educated on LSO brace, tam, doff, purpose                               User Key     Initials Effective Dates Name Provider Type [facility] 08/02/16 -  Alyse Santos PTA Physical Therapy Assistant PT    [facility] 08/02/16 -  Todd Young, PT DPT Physical Therapist PT     08/02/16 -  Elie Chávez, RN Registered Nurse Nurse    CC 09/02/20 -  Nell Garnica, JORI Student PT Student PT              PT  Recommendation and Plan     Plan of Care Reviewed With: patient  Progress: no change  Outcome Summary: pt trans to EOB min-mod of 2, tam LSO max assist, sit-stand mod-max of 2, stand pivot bed-BSC, pt sat on BSC had a large BM, stood mod-max of 2, pulled BSC away and put chair under pt, pt sat in chair min-mod of 2, pt will need placement  Outcome Measures     Row Name 11/02/20 0918             How much help from another is currently needed...    Putting on and taking off regular lower body clothing?  2  -AC      Bathing (including washing, rinsing, and drying)  2  -AC      Toileting (which includes using toilet bed pan or urinal)  2  -AC      Putting on and taking off regular upper body clothing  2  -AC      Taking care of personal grooming (such as brushing teeth)  4  -AC      Eating meals  4  -AC      AM-PAC 6 Clicks Score (OT)  16  -AC         Functional Assessment    Outcome Measure Options  AM-PAC 6 Clicks Daily Activity (OT)  -AC        User Key  (r) = Recorded By, (t) = Taken By, (c) = Cosigned By    Initials Name Provider Type     Laci Randolph, OTR/L, CNT Occupational Therapist           Time Calculation:   PT Charges     Row Name 11/04/20 1445             Time Calculation    Start Time  1400  -      Stop Time  1438  -      Time Calculation (min)  38 min  -      PT Non-Billable Time (min)  15 min  -         Time Calculation- PT    Total Timed Code Minutes- PT  23 minute(s)  -AH         Timed Charges    51612 - PT Therapeutic Activity Minutes  23  -AH        User Key  (r) = Recorded By, (t) = Taken By, (c) = Cosigned By    Initials Name Provider Type     Alyse Santos PTA Physical Therapy Assistant        Therapy Charges for Today     Code Description Service Date Service Provider Modifiers Qty    49378346554 HC PT THERAPEUTIC ACT EA 15 MIN 11/3/2020 Alyse Santos PTA GP 2    03847820054 HC PT THERAPEUTIC ACT EA 15 MIN 11/4/2020 Alyse Santos PTA GP 2          PT G-Codes  Outcome  Measure Options: AM-PAC 6 Clicks Daily Activity (OT)  AM-PAC 6 Clicks Score (PT): 9  AM-PAC 6 Clicks Score (OT): 16    Alyse Santos, PTA  11/4/2020

## 2020-11-04 NOTE — PLAN OF CARE
Goal Outcome Evaluation:  Plan of Care Reviewed With: patient  Progress: no change   Patient c/o of abd pain today, had suppository, Miralax, Fleets today, he had a large BM after he was disimpacted.  Was up in chair for 1.5 hours. Patient states he feels better after BM. TQ2 but sometimes does not want to turn, VSS, steri strips and skin adhesive to back. He has bruising to back, will continue to monitor.

## 2020-11-04 NOTE — PLAN OF CARE
Goal Outcome Evaluation:  Plan of Care Reviewed With: patient  Progress: no change  Outcome Summary: A & O, confused at times, anxious at times, c/o needing to have bowel movement, refused enema, denies pain, steristrips to back incisions, refuses SCDs, refuses turns at times, plan [place] at D/C

## 2020-11-04 NOTE — PROGRESS NOTES
"Donnell Cain  87 y.o.      Chief complaint:   Lumbar stenosis status post lumbar fusion    Subjective  No events    Temp:  [97.4 °F (36.3 °C)-98.1 °F (36.7 °C)] 97.4 °F (36.3 °C)  Heart Rate:  [71-80] 80  Resp:  [16-20] 18  BP: (120-137)/(54-66) 136/66      Objective:  General Appearance:  Comfortable, well-appearing, in no acute distress and not in pain.    Vital signs: (most recent): Blood pressure 136/66, pulse 80, temperature 97.4 °F (36.3 °C), temperature source Oral, resp. rate 18, height 188 cm (74.02\"), weight 83.5 kg (184 lb), SpO2 98 %.  Vital signs are normal.  No fever.    Output: Producing urine.    HEENT: Normal HEENT exam.    Lungs:  Normal effort and normal respiratory rate.  He is not in respiratory distress.    Heart: Normal rate.  Regular rhythm.    Chest: Symmetric chest wall expansion.   Skin:  Warm and dry.    Abdomen: Abdomen is non-distended.    Pulses: Distal pulses are intact.        Neurologic Exam     Mental Status   Oriented to person, place, and time.   Oriented to person.   Oriented to place.   Attention: normal. Concentration: normal.   Speech: speech is normal   Level of consciousness: alert  Knowledge: good.   Normal comprehension.   Confusion noted     Cranial Nerves   Cranial nerves II through XII intact.     CN II   Visual fields full to confrontation.     CN III, IV, VI   Pupils are equal, round, and reactive to light.  Extraocular motions are normal.     CN V   Facial sensation intact.     CN VII   Facial expression full, symmetric.     CN VIII   CN VIII normal.     CN IX, X   CN IX normal.   CN X normal.     CN XI   CN XI normal.     CN XII   CN XII normal.     Motor Exam   Muscle bulk: normal  Overall muscle tone: normal  Right arm pronator drift: absent  Left arm pronator drift: absent    Strength   Strength 5/5 except as noted. Right psoas and quadriceps 2 out of 5  Right hamstring 4- out of 5     Sensory Exam   Light touch normal.   Pinprick normal.     Gait, " Coordination, and Reflexes     Coordination   Finger to nose coordination: normal    Tremor   Resting tremor: absent  Intention tremor: absent  Action tremor: absent    Reflexes   Reflexes 2+ except as noted.         Back pain    CAD (coronary artery disease)    Chronic combined systolic and diastolic congestive heart failure (CMS/HCC)    Permanent atrial fibrillation (CMS/HCC)    HLD (hyperlipidemia)    HTN (hypertension)    Cardiac pacemaker    Inability to walk    Spinal stenosis, lumbar region, with neurogenic claudication    Radiculopathy    Gross hematuria      Lab Results (last 24 hours)     Procedure Component Value Units Date/Time    Basic Metabolic Panel [[id number]]  (Abnormal) Collected: 11/04/20 0519    Specimen: Blood Updated: 11/04/20 0611     Glucose 113 mg/dL      BUN 15 mg/dL      Creatinine 0.61 mg/dL      Sodium 132 mmol/L      Potassium 4.3 mmol/L      Chloride 95 mmol/L      CO2 29.0 mmol/L      Calcium 8.3 mg/dL      eGFR Non African Amer 125 mL/min/1.73      BUN/Creatinine Ratio 24.6     Anion Gap 8.0 mmol/L     Narrative:      GFR Normal >60  Chronic Kidney Disease <60  Kidney Failure <15      CBC (No Diff) [[id number]]  (Abnormal) Collected: 11/04/20 0518    Specimen: Blood Updated: 11/04/20 0533     WBC 16.87 10*3/mm3      RBC 4.54 10*6/mm3      Hemoglobin 12.3 g/dL      Hematocrit 37.7 %      MCV 83.0 fL      MCH 27.1 pg      MCHC 32.6 g/dL      RDW 16.0 %      RDW-SD 47.2 fl      MPV 8.8 fL      Platelets 195 10*3/mm3               Plan:   Patient doing well.  Continue physical and Occupational Therapy.  Waiting for rehab placement.    Juan Jose Gilmore, APRN

## 2020-11-04 NOTE — PROGRESS NOTES
[facility] Medicine Services  INPATIENT PROGRESS NOTE    Patient Name: Donnell Cain  Date of Admission: 10/20/2020  Today's Date: 11/04/20  Length of Stay: 7  Primary Care Physician: No primary care provider on file.    Subjective   Chief Complaint: Follow-up weakness  HPI   Patient seen and examined at bedside.  Discussed with patient and his friend Bryon.  Patient has been very resistant to get up with therapy as he states that he is fearful that he may fall.  He is sit to stand max assist.  He denies shortness of breath, chest pain or pressure.  He has had a bowel movement today.  States that his abdomen feels better.  He is tolerating a diet.  Nutrition has added boost plus twice daily.    Review of Systems  All pertinent negatives and positives are as above. All other systems have been reviewed and are negative unless otherwise stated.     Objective    Temp:  [97.4 °F (36.3 °C)-97.5 °F (36.4 °C)] 97.5 °F (36.4 °C)  Heart Rate:  [71-83] 83  Resp:  [16-20] 18  BP: (120-136)/(54-66) 134/62  Physical Exam  Vitals signs reviewed.   Constitutional:       General: He is not in acute distress.     Appearance: He is not toxic-appearing.      Comments: Lying in bed resting comfortably.  Tolerating room air.  No distress.   HENT:      Head: Normocephalic and atraumatic.      Mouth/Throat:      Mouth: Mucous membranes are moist.      Pharynx: Oropharynx is clear.   Eyes:      Extraocular Movements: Extraocular movements intact.      Conjunctiva/sclera: Conjunctivae normal.      Pupils: Pupils are equal, round, and reactive to light.   Neck:      Musculoskeletal: Normal range of motion and neck supple. No muscular tenderness.   Cardiovascular:      Rate and Rhythm: Normal rate and regular rhythm.      Pulses: Normal pulses.      Heart sounds: No murmur.   Pulmonary:      Effort: Pulmonary effort is normal. No respiratory distress.      Breath sounds: Normal heart sounds.  No  wheezing or rhonchi.   Abdominal:      General: Bowel sounds are normal. There is no distension.      Palpations: Abdomen is soft.      Tenderness: There is no abdominal tenderness.  Musculoskeletal: Normal range of motion.         General: No swelling or tenderness.   Skin:     General: Skin is warm and dry.      Findings: No erythema or rash.   Neurological:      General: No focal deficit present.      Mental Status: He is alert.      Cranial Nerves: No cranial nerve deficit.      Motor: No weakness.      Comments: Oriented to person, place, situation, and year.  Psychiatric:         Mood and Affect: Mood normal.         Behavior: Behavior normal.    Results Review:  I have reviewed the labs, radiology results, and diagnostic studies.    Laboratory Data:   Results from last 7 days   Lab Units 11/04/20  0518 10/30/20  0349 10/29/20  0627   WBC 10*3/mm3 16.87* 14.89* 13.78*   HEMOGLOBIN g/dL 12.3* 11.3* 12.3*   HEMATOCRIT % 37.7 34.7* 38.5   PLATELETS 10*3/mm3 195 142 184        Results from last 7 days   Lab Units 11/04/20  0519 10/30/20  0349 10/29/20  0627   SODIUM mmol/L 132* 137 138   POTASSIUM mmol/L 4.3 3.9 3.7   CHLORIDE mmol/L 95* 99 96*   CO2 mmol/L 29.0 31.0* 33.0*   BUN mg/dL 15 31* 30*   CREATININE mg/dL 0.61* 0.89 1.10   CALCIUM mg/dL 8.3* 7.8* 8.3*   GLUCOSE mg/dL 113* 124* 99     I have reviewed the patient's current medications.     Assessment/Plan     Active Hospital Problems    Diagnosis   • **Back pain   • Gross hematuria   • Inability to walk   • Spinal stenosis, lumbar region, with neurogenic claudication     Added automatically from request for surgery [id number]     • Radiculopathy     Added automatically from request for surgery [id number]     • Cardiac pacemaker   • Chronic combined systolic and diastolic congestive heart failure (CMS/HCC)   • CAD (coronary artery disease)   • HLD (hyperlipidemia)   • HTN (hypertension)   • Permanent atrial fibrillation (CMS/HCC)     Plan:  1.  Patient presented  on 10/20 with a 3-day history of inability to walk.  Patient stated whenever he moves he gets pain in his right lower back/hip area that radiates down his right leg to his toes.  CT of the pelvis showed acute appearing mild to moderate height loss of L5.  Infrarenal aortic aneurysm measuring 3.1 cm.  CT of the lumbar spine showed multilevel degenerative disc, endplate, and facet disease with multilevel foraminal stenosis.  Foraminal narrowing greatest on the right L4/5.  Cardiology was notified while he was in the emergency department regarding holding Xarelto and agreed.  2.  Neurosurgery evaluating patient.  CT Myelogram of lumbar spine on 10/23 demonstrates a rightward coronal degenerative scoliotic deformity causing 2 levels of severe rightward foraminal narrowing.  Lumbar fusion and decompression 10/28 per Dr. Perez.  3.  Delirium has resolved - likely related to prolonged hospitalization, disruption of normal sleep-wake cycle, age, and steroid use.   Chest x-ray negative for acute cardiopulmonary process.  Urinalysis unremarkable.  Leukocytosis likely secondary to steroid use.  He has remained afebrile.    4.  He has a history of chronic combined systolic and diastolic heart failure with last EF on echo in July 2020 noted to be 24%.  Per last cardiology office note on 10/15/2020, patient has refused LifeVest.  He is currently euvolemic.  Continue to monitor strict intake and output.  Daily weights.  Continue Toprol-XL, lisinopril, Lasix, and statin.  5.  Urology evaluated patient for gross hematuria.  He had a Lorenz catheter postoperatively.  Patient was agitated confused and subsequently was tugging on his Lorenz catheter.  He had gross hematuria requiring CBI on 10/29.  It has since resolved and he is voiding per urinal without difficulty.  6.  He has history of atrial fibrillation status post pacemaker and anticoagulated on Xarelto.  Xarelto held on admission.  INR was 2.19 on admission.  On 10/23 he  received FFP for goal of INR less than 1.5 before proceeding with CT Myelogram.  Xarelto was resumed on 10/31.  7.  Hyponatremia - chronic, 134 on admission, 132 today.   8.  Continue Physical and Occupational Therapy.  9.   following for disposition needs.  Patient is awaiting for skilled nursing facility placement.  Referral has been sent to [facility].  Ok for discharge when bed offered.     Discharge Planning: As per attending physician.    Electronically signed by BRYAN Aguilar, 11/04/20, 11:27 CST.

## 2020-11-05 NOTE — PROGRESS NOTES
"    [facility] Medicine Services  INPATIENT PROGRESS NOTE    Patient Name: Donnell Cain  Date of Admission: 10/20/2020  Today's Date: 11/05/20  Length of Stay: 8  Primary Care Physician: No primary care provider on file.    Subjective   Chief Complaint: Follow-up weakness  HPI   Patient lying comfortably in bed.  Alert and oriented x3.  Denies shortness of breath, chest pain or pressure.  Denies nausea, vomiting, abdominal pain.  He had a very large bowel movement yesterday.  Tells me that his stomach is better.  He is urinating without difficulty per urinal.  Patient continues to express concern that he feels therapist will not be able to \"catch me if I fall.\"  He is very scared that he may fall as he has had 3 falls prior to hospitalization.  I encouraged him to continue working with therapy.    Review of Systems   All pertinent negatives and positives are as above. All other systems have been reviewed and are negative unless otherwise stated.     Objective    Temp:  [97.5 °F (36.4 °C)-98.2 °F (36.8 °C)] 97.5 °F (36.4 °C)  Heart Rate:  [70-93] 74  Resp:  [16-18] 16  BP: (101-135)/(58-92) 135/61  Physical Exam  Vitals signs reviewed.   Constitutional:       General: He is not in acute distress.     Appearance: He is not toxic-appearing.      Comments: Lying in bed resting comfortably.  Tolerating room air.  No distress.   HENT:      Head: Normocephalic and atraumatic.      Mouth/Throat:      Mouth: Mucous membranes are moist.      Pharynx: Oropharynx is clear.   Eyes:      Extraocular Movements: Extraocular movements intact.      Conjunctiva/sclera: Conjunctivae normal.      Pupils: Pupils are equal, round, and reactive to light.   Neck:      Musculoskeletal: Normal range of motion and neck supple. No muscular tenderness.   Cardiovascular:      Rate and Rhythm: Normal rate and regular rhythm.      Pulses: Normal pulses.      Heart sounds: No murmur.   Pulmonary:      Effort: " Pulmonary effort is normal. No respiratory distress.      Breath sounds: Normal heart sounds.  No wheezing or rhonchi.   Abdominal:      General: Bowel sounds are normal. There is no distension.      Palpations: Abdomen is soft.      Tenderness: There is no abdominal tenderness.  Musculoskeletal: Normal range of motion.         General: No swelling or tenderness.   Skin:     General: Skin is warm and dry.      Findings: No erythema or rash.   Neurological:      General: No focal deficit present.      Mental Status: He is alert.      Cranial Nerves: No cranial nerve deficit.      Motor: No weakness.      Comments: Oriented to person, place, situation, and year.  Psychiatric:         Mood and Affect: Mood normal.         Behavior: Behavior normal.    Results Review:  I have reviewed the labs, radiology results, and diagnostic studies.    Laboratory Data:   Results from last 7 days   Lab Units 11/04/20  0518 10/30/20  0349   WBC 10*3/mm3 16.87* 14.89*   HEMOGLOBIN g/dL 12.3* 11.3*   HEMATOCRIT % 37.7 34.7*   PLATELETS 10*3/mm3 195 142        Results from last 7 days   Lab Units 11/04/20  0519 10/30/20  0349   SODIUM mmol/L 132* 137   POTASSIUM mmol/L 4.3 3.9   CHLORIDE mmol/L 95* 99   CO2 mmol/L 29.0 31.0*   BUN mg/dL 15 31*   CREATININE mg/dL 0.61* 0.89   CALCIUM mg/dL 8.3* 7.8*   GLUCOSE mg/dL 113* 124*     I have reviewed the patient's current medications.     Assessment/Plan     Active Hospital Problems    Diagnosis   • **Back pain   • Gross hematuria   • Inability to walk   • Spinal stenosis, lumbar region, with neurogenic claudication     Added automatically from request for surgery [id number]     • Radiculopathy     Added automatically from request for surgery [id number]     • Cardiac pacemaker   • Chronic combined systolic and diastolic congestive heart failure (CMS/HCC)   • CAD (coronary artery disease)   • HLD (hyperlipidemia)   • HTN (hypertension)   • Permanent atrial fibrillation (CMS/HCC)     Plan:  1.  Patient  presented on 10/20 with a 3-day history of inability to walk.  Patient stated whenever he moves he gets pain in his right lower back/hip area that radiates down his right leg to his toes.  CT of the pelvis showed acute appearing mild to moderate height loss of L5.  Infrarenal aortic aneurysm measuring 3.1 cm.  CT of the lumbar spine showed multilevel degenerative disc, endplate, and facet disease with multilevel foraminal stenosis.  Foraminal narrowing greatest on the right L4/5.  Cardiology was notified while he was in the emergency department regarding holding Xarelto and agreed.  2.  Neurosurgery evaluating patient.  CT Myelogram of lumbar spine on 10/23 demonstrates a rightward coronal degenerative scoliotic deformity causing 2 levels of severe rightward foraminal narrowing.  Lumbar fusion and decompression 10/28 per Dr. Perez.  3.  Delirium has resolved - likely related to prolonged hospitalization, disruption of normal sleep-wake cycle, age, and steroid use.   Chest x-ray negative for acute cardiopulmonary process.  Urinalysis unremarkable.  Leukocytosis likely secondary to steroid use.  He has remained afebrile.    4.  He has a history of chronic combined systolic and diastolic heart failure with last EF on echo in July 2020 noted to be 24%.  Per last cardiology office note on 10/15/2020, patient has refused LifeVest.  He is currently euvolemic.  Continue to monitor strict intake and output.  Daily weights.  Continue Toprol-XL, lisinopril, Lasix, and statin.  5.  Urology evaluated patient for gross hematuria.  He had a Lorenz catheter postoperatively.  Patient was agitated confused and subsequently was tugging on his Lorenz catheter.  He had gross hematuria requiring CBI on 10/29.  It has since resolved and he is voiding per urinal without difficulty.  6.  He has history of atrial fibrillation status post pacemaker and anticoagulated on Xarelto.  Xarelto held on admission.  INR was 2.19 on admission.  On 10/23  he received FFP for goal of INR less than 1.5 before proceeding with CT Myelogram.  Xarelto was resumed on 10/31.  7.  Hyponatremia - chronic, 134 on admission, 132.   8.  Continue Physical and Occupational Therapy.  9.   following for disposition needs.  Patient is awaiting for skilled nursing facility placement.  Referral has been sent to [facility].  Ok for discharge when bed offered.     Discharge Planning: As per attending physician.    Electronically signed by BRYAN Aguilar, 11/05/20, 08:26 CST.    I personally evaluated and examined the patient in conjunction with BRYAN Hernandez and agree with the assessment, treatment plan, and disposition of the patient as recorded by her. My history, exam, and further recommendations are:     Gen; alert, interactive, NAD  Lungs: CTAB, no wheezing or rales  Abd: soft, nt/nd, +BS    Agree with above.  Patient needs continued ongoing aggressive physical therapy postoperatively.  Plan on discharging to SNF today.  Cardiac status appears stable.  Continue chronic cardiac medications after discharge.  Follow-up as an outpatient with PCP and cardiology.    Electronically signed by Lamine Wade DO, 11/5/2020, 15:44 CST.

## 2020-11-05 NOTE — PROGRESS NOTES
Continued Stay Note   [facility]     Patient Name: Donnell Cain  MRN: [id number]  Today's Date: 11/5/2020    Admit Date: 10/20/2020    Discharge Plan     Row Name 11/05/20 1103       Plan    Final Discharge Disposition Code  03 - skilled nursing facility (SNF)    Final Note  Pt can dc to [facility] today, precert is complete. Pt will be skilled level of care. Pt will need new Covid test done. Call report number is [phone]        Discharge Codes    No documentation.             KISHA Curry

## 2020-11-05 NOTE — PLAN OF CARE
Goal Outcome Evaluation:  Plan of Care Reviewed With: patient  Progress: no change  Outcome Summary: A & O with some confusion at night, incontinent of urine at times, multiple steristrips to back, lots of bruising to back and flanks, heels red blanchable and elevated on pillow, hands and arms red with scabs, little motivation, plan D/C to [facility]

## 2020-11-05 NOTE — THERAPY TREATMENT NOTE
Acute Care - Occupational Therapy Treatment Note  [facility]     Patient Name: Donnell Cain  : 1933  MRN: [id number]  Today's Date: 2020  Onset of Illness/Injury or Date of Surgery: 10/20/20  Date of Referral to OT: 10/20/20  Referring Physician: Juan Jose ADAMS    Admit Date: 10/20/2020       ICD-10-CM ICD-9-CM   1. Inability to walk  R26.2 719.7   2. Decreased activities of daily living (ADL)  Z78.9 V49.89   3. Impaired functional mobility, balance, gait, and endurance  Z74.09 V49.89   4. Radiculopathy, unspecified spinal region  M54.10 729.2   5. Low back pain, unspecified back pain laterality, unspecified chronicity, unspecified whether sciatica present  M54.5 724.2   6. Spinal stenosis, lumbar region, with neurogenic claudication  M48.062 724.03     Patient Active Problem List   Diagnosis   • CAD (coronary artery disease)   • Chronic combined systolic and diastolic congestive heart failure (CMS/HCC)   • SSS (sick sinus syndrome) (CMS/HCC)   • Permanent atrial fibrillation (CMS/HCC)   • HLD (hyperlipidemia)   • HTN (hypertension)   • Murmur   • Cardiac pacemaker   • Hyponatremia   • Weakness   • Current use of long term anticoagulation   • Inability to walk   • Back pain   • Spinal stenosis, lumbar region, with neurogenic claudication   • Radiculopathy   • Gross hematuria     Past Medical History:   Diagnosis Date   • A-fib (CMS/HCC)    • Atherosclerosis of coronary artery bypass graft    • Atrial fibrillation (CMS/HCC)    • CAD (coronary artery disease)    • Cardiac pacemaker    • CHF (congestive heart failure) (CMS/HCC)    • Congestive heart failure (CMS/HCC)    • HLD (hyperlipidemia)    • HTN (hypertension)    • Ischemic cardiomyopathy    • MI, old    • Myocardial infarction (CMS/HCC)    • SSS (sick sinus syndrome) (CMS/HCC)      Past Surgical History:   Procedure Laterality Date   • APPENDECTOMY     • CARDIAC PACEMAKER PLACEMENT     • CORONARY ARTERY BYPASS GRAFT     • LUMBAR LAMINECTOMY  WITH FUSION Right 10/28/2020    Procedure: LUMBAR discectomy, LAMINECTOMY TRANSFORAMINAL LUMBAR INTERBODY FUSION RIGHT L2-3,3-4,4-5;  Surgeon: Edwardo Perez MD;  Location: [facility] OR;  Service: Neurosurgery;  Laterality: Right;   • PACEMAKER IMPLANTATION      x 3    • TONSILLECTOMY            OT ASSESSMENT FLOWSHEET (last 12 hours)      OT Evaluation and Treatment     Row Name 11/05/20 1127                   OT Time and Intention    Subjective Information  complains of;weakness;fatigue  -TS        Document Type  therapy note (daily note)  -TS        Mode of Treatment  occupational therapy  -TS        Patient Effort  fair  -TS        Comment  requires lots of encouragement to participate  -TS           General Information    Existing Precautions/Restrictions  fall;brace worn when out of bed;spinal  -TS           Cognition    Personal Safety Interventions  fall prevention program maintained;gait belt;nonskid shoes/slippers when out of bed  -TS           Pain Scale: Numbers Pre/Post-Treatment    Pretreatment Pain Rating  3/10  -TS        Posttreatment Pain Rating  7/10  -TS        Pain Location  back  -TS        Pain Intervention(s)  Repositioned  -TS           Bed Mobility    Rolling Right [place] (Bed Mobility)  minimum assist (75% patient effort);verbal cues  -TS        Sidelying-Sit [place] (Bed Mobility)  minimum assist (75% patient effort);verbal cues  -TS        Assistive Device (Bed Mobility)  bed rails  -TS           Transfers    Sit-Stand [place] (Transfers)  verbal cues;moderate assist (50% patient effort);2 person assist  -TS        Stand-Sit [place] (Transfers)  minimum assist (75% patient effort);moderate assist (50% patient effort);2 person assist  -TS           Sit-Stand Transfer    Assistive Device (Sit-Stand Transfers)  walker, front-wheeled  -TS           Stand-Sit Transfer    Assistive Device (Stand-Sit Transfers)  walker, front-wheeled  -TS           Stand Pivot/Stand Step  Transfer    Stand Pivot/Stand Step [place] (Transfers)  verbal cues;minimum assist (75% patient effort);moderate assist (50% patient effort);2 person assist  -TS        Assistive Device (Stand Pivot Stand Step Transfer)  walker, front-wheeled  -TS           Activities of Daily Living    BADL Assessment/Intervention  lower body dressing;grooming  -TS           Lower Body Dressing Assessment/Training    [place] Level (Lower Body Dressing)  don;socks;maximum assist (25% patient effort)  -TS        Position (Lower Body Dressing)  edge of bed sitting  -TS           Grooming Assessment/Training    [place] Level (Grooming)  wash face, hands;oral care regimen;set up  -TS        Position (Grooming)  supported sitting;sink side  -TS           Wound 10/27/20 1509 Right heel    Wound - Properties Group Placement Date: 10/27/20  -GP Placement Time: 1509  -GP Side: Right  -GP Location: heel  -GP    Retired Wound - Properties Group Date first assessed: 10/27/20  -GP Time first assessed: 1509  -GP Side: Right  -GP Location: heel  -GP       Wound 10/28/20 0107 Left anterior foot    Wound - Properties Group Placement Date: 10/28/20  -TR Placement Time: 0107  -TR Side: Left  -TR Orientation: anterior  -TR Location: foot  -TR    Retired Wound - Properties Group Date first assessed: 10/28/20  -TR Time first assessed: 0107  -TR Side: Left  -TR Location: foot  -TR       Wound 10/28/20 1133 lumbar spine Incision    Wound - Properties Group Placement Date: 10/28/20  -HW Placement Time: 1133  -HW Present on Hospital Admission: N  -HW Location: lumbar spine  -HW Primary Wound Type: Incision  -HW    Retired Wound - Properties Group Date first assessed: 10/28/20  -HW Time first assessed: 1133  -HW Present on Hospital Admission: N  -HW Location: lumbar spine  -HW Primary Wound Type: Incision  -HW       Wound 11/05/20 0920 Left gluteal Pressure Injury    Wound - Properties Group Placement Date: 11/05/20  -TM Placement Time: 0920   -TM Present on Hospital Admission: N  -TM Side: Left  -TM Location: gluteal  -TM Primary Wound Type: Pressure inj  -TM Stage, Pressure Injury : Stage 1  -TM    Retired Wound - Properties Group Date first assessed: 11/05/20  -TM Time first assessed: 0920  -TM Present on Hospital Admission: N  -TM Side: Left  -TM Location: gluteal  -TM Primary Wound Type: Pressure inj  -TM       Positioning and Restraints    Pre-Treatment Position  in bed  -TS        Post Treatment Position  chair  -TS        In Chair  sitting;call light within reach;encouraged to call for assist;notified nsg;with brace  -TS          User Key  (r) = Recorded By, (t) = Taken By, (c) = Cosigned By    Initials Name Effective Dates    TS Jovana Lundberg, ROCKY/QUINCY 08/02/16 -     GP Marilou Martinez RN 07/17/20 -     TM Lito Gomez, JB 11/06/19 -     HW Justin Diaz RN 06/05/20 -     TR Meliza Rucker RN 12/31/19 -          Occupational Therapy Education                 Title: PT OT SLP Therapies (Resolved)     Topic: Occupational Therapy (Resolved)     Point: ADL training (Resolved)     Description:   Instruct learner(s) on proper safety adaptation and remediation techniques during self care or transfers.   Instruct in proper use of assistive devices.              Learning Progress Summary           Patient Acceptance, E, NR by FIFI at 11/2/2020 1037    Acceptance, E, VU by RICK at 10/29/2020 1150    Acceptance, E, VU by  at 10/23/2020 1202    Comment: Role of OT, safety with ADL, importance of OOB activites    Acceptance, E, VU by FIFI at 10/21/2020 1021                   Point: Home exercise program (Resolved)     Description:   Instruct learner(s) on appropriate technique for monitoring, assisting and/or progressing therapeutic exercises/activities.              Learning Progress Summary           Patient Acceptance, E,TB, VU,DU,NR by MINNA at 11/1/2020 1337    Comment: Pt. performed ue exs with c/o's during tx of fatigue!                   Point:  Precautions (Resolved)     Description:   Instruct learner(s) on prescribed precautions during self-care and functional transfers.              Learning Progress Summary           Patient Acceptance, E,TB, VU,DU,NR by  at 11/1/2020 1337    Comment: Pt. performed ue exs with c/o's during tx of fatigue!    Acceptance, E, VU by  at 10/29/2020 1150                   Point: Body mechanics (Resolved)     Description:   Instruct learner(s) on proper positioning and spine alignment during self-care, functional mobility activities and/or exercises.              Learning Progress Summary           Patient Acceptance, E, NR by  at 11/2/2020 1037    Acceptance, E,TB, VU,DU,NR by  at 11/1/2020 1337    Comment: Pt. performed ue exs with c/o's during tx of fatigue!    Acceptance, E, VU by  at 10/29/2020 1150                               User Key     Initials Effective Dates Name Provider Type Discipline     04/09/19 -  Laci Randolph OTR/L, DIONISIO Occupational Therapist OT     08/02/16 -  Irineo Coleman COTA/L Occupational Therapy Assistant OT     07/05/20 -  Jen Flores OTR/L Occupational Therapist OT     07/16/20 -  Isabell Jarquin OT Student OT Student OT                  OT Recommendation and Plan          Outcome Measures     Row Name 11/04/20 1500             How much help from another is currently needed...    Putting on and taking off regular lower body clothing?  2  -TS      Bathing (including washing, rinsing, and drying)  2  -TS      Toileting (which includes using toilet bed pan or urinal)  2  -TS      Putting on and taking off regular upper body clothing  2  -TS      Taking care of personal grooming (such as brushing teeth)  4  -TS      Eating meals  4  -TS      AM-PAC 6 Clicks Score (OT)  16  -TS        User Key  (r) = Recorded By, (t) = Taken By, (c) = Cosigned By    Initials Name Provider Type    TS Jovana Lundberg COTA/L Occupational Therapy Assistant          Time Calculation:    Time Calculation- OT     Row Name 11/05/20 1349             Time Calculation- OT    OT Start Time  1127  -TS      OT Stop Time  1155  -TS      OT Time Calculation (min)  28 min  -TS      Total Timed Code Minutes- OT  28 minute(s)  -TS      OT Received On  11/05/20  -TS         Timed Charges    41207 - OT Self Care/Mgmt Minutes  28  -TS        User Key  (r) = Recorded By, (t) = Taken By, (c) = Cosigned By    Initials Name Provider Type     oJvana Lundberg HIDALGO/L Occupational Therapy Assistant        Therapy Charges for Today     Code Description Service Date Service Provider Modifiers Qty    42220752799 HC OT SELF CARE/MGMT/TRAIN EA 15 MIN 11/4/2020 Jovana Lundberg COTA/L GO 2    66951628686 HC OT SELF CARE/MGMT/TRAIN EA 15 MIN 11/5/2020 Jovana Lundberg COTA/L GO 2               Jovana GALLEGO. ROCKY Lundberg/QUINCY  11/5/2020

## 2020-11-05 NOTE — PLAN OF CARE
Goal Outcome Evaluation:  Plan of Care Reviewed With: patient  Progress: no change  Outcome Summary: Alert. Oriented to self. VSS. Voids per urinal. Refusing to turn. Heels elevated on multiple pillows. Bruising present to bilateral flanks and hips. Plan to d/c to [place] today.

## 2020-11-05 NOTE — PROGRESS NOTES
Donnell Cain  87 y.o.      Chief complaint:   Right lower extremity pain    Subjective  Right lower extremity pain controlled.  Working with physical therapy on right leg weakness.  Tolerating all p.o.    Temp:  [97.5 °F (36.4 °C)-98.2 °F (36.8 °C)] 97.5 °F (36.4 °C)  Heart Rate:  [70-93] 74  Resp:  [16-18] 16  BP: (101-135)/(58-92) 135/61      Objective    Neurologic Exam     Mental Status   Oriented to person, place, and time.   Attention: normal.   Speech: speech is normal   Level of consciousness: alert  Knowledge: good.     Cranial Nerves     CN II   Visual fields full to confrontation.     CN III, IV, VI   Pupils are equal, round, and reactive to light.  Extraocular motions are normal.     CN V   Facial sensation intact.     CN VII   Facial expression full, symmetric.     CN VIII   CN VIII normal.     CN IX, X   CN IX normal.   CN X normal.     CN XI   CN XI normal.     CN XII   CN XII normal.     Motor Exam   Muscle bulk: normal  Overall muscle tone: normal  Right arm pronator drift: absent  Left arm pronator drift: absent    Strength   Strength 5/5 except as noted. Right iliopsoas and quadriceps 3- out of 5, hamstrings 3+ out of 5, dorsiflexion plantarflexion 4 out of 5     Sensory Exam   Light touch normal.   Pinprick normal.     Gait, Coordination, and Reflexes     Coordination   Finger to nose coordination: normal    Tremor   Resting tremor: absent  Intention tremor: absent  Action tremor: absent    Reflexes   Reflexes 2+ except as noted.         Back pain    CAD (coronary artery disease)    Chronic combined systolic and diastolic congestive heart failure (CMS/HCC)    Permanent atrial fibrillation (CMS/HCC)    HLD (hyperlipidemia)    HTN (hypertension)    Cardiac pacemaker    Inability to walk    Spinal stenosis, lumbar region, with neurogenic claudication    Radiculopathy    Gross hematuria      Lab Results (last 24 hours)     ** No results found for the last 24 hours. **              Plan:    Multilevel lumbar fusion.  Pain control improved.  Working with therapy on right leg strength standing and walking.  Plan rehab placement today.    Edwardo Perez MD

## 2020-11-05 NOTE — THERAPY TREATMENT NOTE
Acute Care - Physical Therapy Treatment Note  [facility]     Patient Name: Donnell Cain  : 1933  MRN: [id number]  Today's Date: 2020   Onset of Illness/Injury or Date of Surgery: 10/20/20       PT Assessment (last 12 hours)      PT Evaluation and Treatment     Row Name 20 1123          Physical Therapy Time and Intention    Subjective Information  complains of;pain  -KJ     Document Type  therapy note (daily note)  -KJ     Mode of Treatment  physical therapy  -KJ     Patient Effort  fair  -KJ     Comment  requires continuous encouragement throughout tx to do anything  -KJ     Row Name 20 1123          General Information    Existing Precautions/Restrictions  fall;brace worn when out of bed;spinal  -KJ     Row Name 20 1123          Pain Scale: Numbers Pre/Post-Treatment    Pretreatment Pain Rating  10/10  -KJ     Posttreatment Pain Rating  10/10  -KJ     Pain Location  back  -KJ     Row Name 20 1123          Bed Mobility    Sidelying-Sit [place] (Bed Mobility)  minimum assist (75% patient effort);moderate assist (50% patient effort);verbal cues  -KJ     Row Name 20 1123          Transfers    Sit-Stand [place] (Transfers)  verbal cues;moderate assist (50% patient effort);2 person assist  -KJ     Stand-Sit [place] (Transfers)  verbal cues;moderate assist (50% patient effort);2 person assist  -KJ     Row Name 20 1123          Sit-Stand Transfer    Assistive Device (Sit-Stand Transfers)  walker, front-wheeled  -KJ     Row Name 20 1123          Stand-Sit Transfer    Assistive Device (Stand-Sit Transfers)  walker, front-wheeled  -KJ     Row Name 20 1123          Stand Pivot/Stand Step Transfer    Stand Pivot/Stand Step [place] (Transfers)  verbal cues;minimum assist (75% patient effort);moderate assist (50% patient effort);2 person assist  -KJ     Assistive Device (Stand Pivot Stand Step Transfer)  walker, front-wheeled  -KJ     Row Name  11/05/20 1123          Gait/Stairs (Locomotion)    Comment (Gait/Stairs)  pivot to chair  -KJ     Row Name             Wound 10/27/20 1509 Right heel    Wound - Properties Group Placement Date: 10/27/20  -GP Placement Time: 1509  -GP Side: Right  -GP Location: heel  -GP    Retired Wound - Properties Group Date first assessed: 10/27/20  -GP Time first assessed: 1509  -GP Side: Right  -GP Location: heel  -GP    Row Name             Wound 10/28/20 0107 Left anterior foot    Wound - Properties Group Placement Date: 10/28/20  -TR Placement Time: 0107  -TR Side: Left  -TR Orientation: anterior  -TR Location: foot  -TR    Retired Wound - Properties Group Date first assessed: 10/28/20  -TR Time first assessed: 0107  -TR Side: Left  -TR Location: foot  -TR    Row Name             Wound 10/28/20 1133 lumbar spine Incision    Wound - Properties Group Placement Date: 10/28/20  -HW Placement Time: 1133  -HW Present on Hospital Admission: N  -HW Location: lumbar spine  -HW Primary Wound Type: Incision  -HW    Retired Wound - Properties Group Date first assessed: 10/28/20  -HW Time first assessed: 1133  -HW Present on Hospital Admission: N  -HW Location: lumbar spine  -HW Primary Wound Type: Incision  -HW    Row Name             Wound 11/05/20 0920 Left gluteal Pressure Injury    Wound - Properties Group Placement Date: 11/05/20  -TM Placement Time: 0920  -TM Present on Hospital Admission: N  -TM Side: Left  -TM Location: gluteal  -TM Primary Wound Type: Pressure inj  -TM Stage, Pressure Injury : Stage 1  -TM    Retired Wound - Properties Group Date first assessed: 11/05/20  -TM Time first assessed: 0920  -TM Present on Hospital Admission: N  -TM Side: Left  -TM Location: gluteal  -TM Primary Wound Type: Pressure inj  -TM    Row Name 11/05/20 1123          Positioning and Restraints    Pre-Treatment Position  in bed  -KJ     Post Treatment Position  chair  -KJ     In Bed  call light within reach  -KJ       User Key  (r) = Recorded  By, (t) = Taken By, (c) = Cosigned By    Initials Name Provider Type    Haven Saleh, PTA Physical Therapy Assistant    GP Marilou Martinez, RN Registered Nurse    Lito Maya, RN Registered Nurse    Justin Cummins, RN Registered Nurse    Meliza Dupont, RN Registered Nurse        Physical Therapy Education                 Title: PT OT SLP Therapies (Resolved)     Topic: Physical Therapy (Resolved)     Point: Mobility training (Resolved)     Learning Progress Summary           Patient Acceptance, E, NR by  at 10/29/2020 1045    Comment: Educated on LSO brace, tam, doff, purpose    Acceptance, E,TB, VU by  at 10/22/2020 1448    Comment: trans    Acceptance, E, VU by  at 10/21/2020 1032    Comment: Pt educated on PT's role in POC                   Point: Home exercise program (Resolved)     Learning Progress Summary           Patient Acceptance, E,TB,D,H, NR by  at 11/2/2020 1040    Comment: BLE HEP                   Point: Body mechanics (Resolved)     Learning Progress Summary           Patient Acceptance, E, NR by  at 11/5/2020 0500    Acceptance, E, VU by  at 11/4/2020 0459                   Point: Precautions (Resolved)     Learning Progress Summary           Patient Acceptance, E,TB, VU by  at 11/4/2020 1445    Comment: LSO    Acceptance, E,TB, NR by  at 11/3/2020 1345    Comment: benefits of activity    Acceptance, E, NR by  at 10/29/2020 1045    Comment: Educated on LSO brace, tam, doff, purpose                               User Key     Initials Effective Dates Name Provider Type Discipline     08/02/16 -  Alyse Santos PTA Physical Therapy Assistant PT     08/02/16 -  Todd Young, PT DPT Physical Therapist PT     08/02/16 -  Elie Chávez, RN Registered Nurse Nurse    CC 09/02/20 -  Nell Garnica, JORI Student PT Student PT              PT Recommendation and Plan     Plan of Care Reviewed With: patient  Progress: improving  Outcome Summary: PT tx completed.  Pt supine in bed sleeping.  Encouragement needed to increase activity. Mod/MaxA sidelying<>sit, sit edge of bed x 15 minutes SBA. Performed BLE's AROM. Sit<>stand x 2 Mod/Max A x 2. Pt able to fully stand erect with cues. BLE's very weak, and not safe to take steps yet. Although, his mobility was better today. Benefit from rehab . vs. SNF.  Outcome Measures     Row Name 11/04/20 1500             How much help from another is currently needed...    Putting on and taking off regular lower body clothing?  2  -TS      Bathing (including washing, rinsing, and drying)  2  -TS      Toileting (which includes using toilet bed pan or urinal)  2  -TS      Putting on and taking off regular upper body clothing  2  -TS      Taking care of personal grooming (such as brushing teeth)  4  -TS      Eating meals  4  -TS      AM-PAC 6 Clicks Score (OT)  16  -TS        User Key  (r) = Recorded By, (t) = Taken By, (c) = Cosigned By    Initials Name Provider Type    Jovana Mann, ROCKY/L Occupational Therapy Assistant           Time Calculation:   PT Charges     Row Name 11/05/20 1135             Time Calculation    Start Time  1123  -KJ      Stop Time  1135  -KJ      Time Calculation (min)  12 min  -KJ      PT Received On  11/05/20  -KJ      PT Goal Re-Cert Due Date  11/08/20  -KJ         Time Calculation- PT    Total Timed Code Minutes- PT  12 minute(s)  -KJ        User Key  (r) = Recorded By, (t) = Taken By, (c) = Cosigned By    Initials Name Provider Type    Haven Saleh PTA Physical Therapy Assistant        Therapy Charges for Today     Code Description Service Date Service Provider Modifiers Qty    [id number]  PT THERAPEUTIC ACT EA 15 MIN 11/5/2020 Haven Marshall PTA GP 1          PT G-Codes  Outcome Measure Options: AM-PAC 6 Clicks Daily Activity (OT)  AM-PAC 6 Clicks Score (PT): 9  AM-PAC 6 Clicks Score (OT): 16    Haven Marshall PTA  11/5/2020

## 2020-11-05 NOTE — DISCHARGE SUMMARY
Date of Discharge:  11/5/2020    Discharge Diagnosis: Inability to walk [R26.2]  Back pain [M54.9]  Inability to walk [R26.2]    Presenting Problem/History of Present Illness  Inability to walk [R26.2]  Back pain [M54.9]  Inability to walk [R26.2]       Hospital Course  Patient is a 87 y.o. male presented with Inability to walk [R26.2]  Back pain [M54.9]  Inability to walk [R26.2].  The patient presented to the emergency room for severe right lower extremity pain and weakness.  He was admitted to the hospitalist services for further evaluation.  He was taken Xarelto which was stopped and the patient had to have a CT myelogram of the lumbar spine which was done and did show severe lumbar stenosis prominent on the right side.  The patient went to the operating room on October 28, 2020 for a L2-3, 3-4, 4-5 right facetectomy, hemilaminectomy, foraminotomy, discectomy with interbody fusion and posterior pedicle screw from L2-L5.  The patient did have immediate improvement in his lower extremity pain however he did still have significant weakness in his right lower extremity.  The patient went to the operating room on October 28, 2020 for a the patient tolerated procedure well.  Currently the patient is ambulating with max assist from physical therapy.  The patient is tolerating p.o.  The patient is voiding spontaneously.  It is felt that this time the patient is stable and suitable to be discharged to a nursing facility for further rehab..  See orders for discharge instructions and restrictions.  The patient is to clean the wound daily with soap and water.  They are to call the office with any drainage from the incision or worsening pain.  We will follow-up with him in the office in 3 weeks.  He is to wear the brace whenever he is out of bed.  He can participate in any activity therapy deems appropriate.  The nursing was to call with any concerns.    Procedures Performed  Procedure(s):  LUMBAR discectomy, LAMINECTOMY  TRANSFORAMINAL LUMBAR INTERBODY FUSION RIGHT right facetectomy, hemilaminectomy       Consults:   Consults     Date and Time Order Name Status Description    10/29/2020 0713 Inpatient Urology Consult Completed     10/20/2020 1539 Neurosurgery (on-call MD unless specified) Completed             Condition on Discharge: Stable    Vital Signs  Temp:  [97.5 °F (36.4 °C)-98.2 °F (36.8 °C)] 97.5 °F (36.4 °C)  Heart Rate:  [70-93] 74  Resp:  [14-18] 16  BP: (101-143)/(58-92) 135/61    Physical Exam:   Physical Exam  Constitutional:       Appearance: He is well-developed.   HENT:      Head: Normocephalic.   Eyes:      Pupils: Pupils are equal, round, and reactive to light.   Neck:      Musculoskeletal: Normal range of motion.   Pulmonary:      Effort: Pulmonary effort is normal.   Musculoskeletal: Normal range of motion.   Skin:     General: Skin is warm.   Neurological:      Mental Status: He is alert and oriented to person, place, and time.      GCS: GCS eye subscore is 4. GCS verbal subscore is 5. GCS motor subscore is 6.      Cranial Nerves: No cranial nerve deficit.      Sensory: No sensory deficit.      Gait: Gait is intact. Gait normal.      Deep Tendon Reflexes: Reflexes are normal and symmetric.   Psychiatric:         Speech: Speech normal.         Behavior: Behavior normal.         Thought Content: Thought content normal.          Neurologic Exam     Mental Status   Oriented to person, place, and time.   Speech: speech is normal     Cranial Nerves     CN III, IV, VI   Pupils are equal, round, and reactive to light.    Motor Exam     Strength   Strength 5/5 except as noted. Right psoas and quadriceps 2-3 out of 5  Right hamstring 3-4 out of 5  All other muscle groups are 5 out of 5     Gait, Coordination, and Reflexes     Gait  Gait: normal         Discharge Disposition  Skilled Nursing Facility (DC - External)    Discharge Medications     Discharge Medications      Changes to Medications      Instructions Start  Date   Xarelto 20 MG tablet  Generic drug: rivaroxaban  What changed:   · how much to take  · how to take this  · when to take this   TAKE 1 TABLET DAILY WITH DINNER         Continue These Medications      Instructions Start Date   coenzyme Q10 100 MG capsule   100 mg, Oral, Daily      furosemide 20 MG tablet  Commonly known as: LASIX   1 tablet, Oral, Daily      lisinopril 10 MG tablet  Commonly known as: PRINIVIL,ZESTRIL   1 tablet, Oral, Daily      metoprolol succinate  MG 24 hr tablet  Commonly known as: TOPROL-XL   100 mg, Oral, Daily      multivitamin with minerals tablet tablet   1 tablet, Oral, Daily      pravastatin 20 MG tablet  Commonly known as: PRAVACHOL   TAKE 1 TABLET DAILY             Discharge Diet:   Diet Instructions     Diet: Regular; Thin      Discharge Diet: Regular    Fluid Consistency: Thin          Activity at Discharge:   Activity Instructions     Other Instructions (Specify)      Activity Instructions: no strenuous activity. Wear brace when out of bed or sitting up.  Okay to participate in any activity therapy deems appropriate          Follow-up Appointments  Future Appointments   Date Time Provider Department Center   1/11/2021 10:30 AM JEWELS Granda MD [facility]   1/15/2021 10:15 AM [facility]   1/15/2021 10:30 AM Lux Lua APRN [facility]     Additional Instructions for the Follow-ups that You Need to Schedule     Call MD With Problems / Concerns   As directed      Call with worsening back or leg pain, drainage from wound, fever, or difficulty walking    Order Comments: Call with worsening back or leg pain, drainage from wound, fever, or difficulty walking          Discharge Follow-up with Specialty: shahzad garcía np; 3 Weeks   As directed      Specialty: shahzad garcía np    Follow Up: 3 Weeks               Test Results Pending at Discharge  Pending Labs     Order Current Status    COVID PRE-OP /  PRE-PROCEDURE SCREENING ORDER (NO ISOLATION) - Swab, Nasal Cavity In process    COVID-19,[facility] IN-HOUSE,Nasal Swab No Transport Media 3-4 HR TAT - Swab, Nasal Cavity In process           Juan Jose Gilmore, APRN  11/05/20  11:17 CST    Time: Discharge 20 min

## 2020-11-06 NOTE — THERAPY DISCHARGE NOTE
Acute Care - Physical Therapy Discharge Summary  [facility]       Patient Name: Donnell Cain  : 1933  MRN: [id number]    Today's Date: 2020  Onset of Illness/Injury or Date of Surgery: 10/20/20       Referring Physician: Juan Jose ADAMS      Admit Date: 10/20/2020      PT Recommendation and Plan    Visit Dx:    ICD-10-CM ICD-9-CM   1. Inability to walk  R26.2 719.7   2. Decreased activities of daily living (ADL)  Z78.9 V49.89   3. Impaired functional mobility, balance, gait, and endurance  Z74.09 V49.89   4. Radiculopathy, unspecified spinal region  M54.10 729.2   5. Low back pain, unspecified back pain laterality, unspecified chronicity, unspecified whether sciatica present  M54.5 724.2   6. Spinal stenosis, lumbar region, with neurogenic claudication  M48.062 724.03       Outcome Measures     Row Name 20 1500             How much help from another is currently needed...    Putting on and taking off regular lower body clothing?  2  -TS      Bathing (including washing, rinsing, and drying)  2  -TS      Toileting (which includes using toilet bed pan or urinal)  2  -TS      Putting on and taking off regular upper body clothing  2  -TS      Taking care of personal grooming (such as brushing teeth)  4  -TS      Eating meals  4  -TS      AM-PAC 6 Clicks Score (OT)  16  -TS        User Key  (r) = Recorded By, (t) = Taken By, (c) = Cosigned By    Initials Name Provider Type    TS Jovana Lundberg, ROCKY/L Occupational Therapy Assistant              Rehab Goal Summary     Row Name 20 1444 20 0700          Bed Mobility Goal 1 (PT)    Activity/Assistive Device (Bed Mobility Goal 1, PT)  sidelying to sit/sit to sidelying  -AB  --     [place] Level/Cues Needed (Bed Mobility Goal 1, PT)  standby assist  -AB  --     Time Frame (Bed Mobility Goal 1, PT)  long term goal (LTG);10 days  -AB  --     Progress/Outcomes (Bed Mobility Goal 1, PT)  goal not met  -AB  --        Transfer Goal 1  (PT)    Activity/Assistive Device (Transfer Goal 1, PT)  sit-to-stand/stand-to-sit;bed-to-chair/chair-to-bed  -AB  --     [place] Level/Cues Needed (Transfer Goal 1, PT)  minimum assist (75% or more patient effort)  -AB  --     Time Frame (Transfer Goal 1, PT)  long term goal (LTG);by discharge  -AB  --     Progress/Outcome (Transfer Goal 1, PT)  goal not met  -AB  --        Gait Training Goal 1 (PT)    Activity/Assistive Device (Gait Training Goal 1, PT)  gait (walking locomotion);assistive device use;decrease fall risk;improve balance and speed;increase endurance/gait distance  -AB  --     [place] Level (Gait Training Goal 1, PT)  minimum assist (75% or more patient effort);2 person assist  -AB  --     Distance (Gait Training Goal 1, PT)  25  -AB  --     Time Frame (Gait Training Goal 1, PT)  long term goal (LTG);10 days  -AB  --     Progress/Outcome (Gait Training Goal 1, PT)  goal not met  -AB  --        Patient Education Goal (PT)    Activity (Patient Education Goal, PT)  Pt will sit EOB with/without B UE support for >/= 3 minutes in order to demonstrate improvement with activity tolerance and weight bearing tolerance through R LE.  -AB  --     [place]/Cues/Accuracy (Memory Goal 2, PT)  demonstrates adequately  -AB  --     Time Frame (Patient Education Goal, PT)  long term goal (LTG);by discharge  -AB  --     Progress/Outcome (Patient Education Goal, PT)  goal not met  -AB  --        Transfer Goal 1 (OT)    Activity/Assistive Device (Transfer Goal 1, OT)  --  bed-to-chair/chair-to-bed;toilet  -CS     [place] Level/Cues Needed (Transfer Goal 1, OT)  --  minimum assist (75% or more patient effort)  -CS     Time Frame (Transfer Goal 1, OT)  --  long term goal (LTG);10 days  -CS     Progress/Outcome (Transfer Goal 1, OT)  --  goal not met  -CS        Bathing Goal 1 (OT)    Activity/Device (Bathing Goal 1, OT)  --  bathing skills, all;shower chair  -CS     [place] Level/Cues Needed (Bathing  Goal 1, OT)  --  minimum assist (75% or more patient effort)  -CS     Time Frame (Bathing Goal 1, OT)  --  long term goal (LTG);10 days  -CS     Progress/Outcomes (Bathing Goal 1, OT)  --  goal not met  -CS        Dressing Goal 1 (OT)    Activity/Device (Dressing Goal 1, OT)  --  lower body dressing  -CS     [place]/Cues Needed (Dressing Goal 1, OT)  --  minimum assist (75% or more patient effort)  -CS     Time Frame (Dressing Goal 1, OT)  --  long term goal (LTG);by discharge  -CS     Progress/Outcome (Dressing Goal 1, OT)  --  goal not met  -CS       User Key  (r) = Recorded By, (t) = Taken By, (c) = Cosigned By    Initials Name Provider Type Discipline    AB Maryanne Mendoza, PTA Physical Therapy Assistant PT    CS Nell Pride, OTR/L, CNT Occupational Therapist OT              PT Discharge Summary  Anticipated Discharge Disposition (PT): skilled nursing facility  Reason for Discharge: Discharge from facility  Outcomes Achieved: Refer to plan of care for updates on goals achieved  Discharge Destination: SNF      Maryanne Mendoza PTA   11/6/2020

## 2020-11-06 NOTE — THERAPY DISCHARGE NOTE
Acute Care - Occupational Therapy Discharge Summary  [facility]     Patient Name: Donnell Cain  : 1933  MRN: [id number]    Today's Date: 2020  Onset of Illness/Injury or Date of Surgery: 10/20/20    Date of Referral to OT: 10/20/20  Referring Physician: Juan Jose ADAMS      Admit Date: 10/20/2020        OT Recommendation and Plan    Visit Dx:    ICD-10-CM ICD-9-CM   1. Inability to walk  R26.2 719.7   2. Decreased activities of daily living (ADL)  Z78.9 V49.89   3. Impaired functional mobility, balance, gait, and endurance  Z74.09 V49.89   4. Radiculopathy, unspecified spinal region  M54.10 729.2   5. Low back pain, unspecified back pain laterality, unspecified chronicity, unspecified whether sciatica present  M54.5 724.2   6. Spinal stenosis, lumbar region, with neurogenic claudication  M48.062 724.03               Rehab Goal Summary     Row Name 20 0700             Transfer Goal 1 (OT)    Activity/Assistive Device (Transfer Goal 1, OT)  bed-to-chair/chair-to-bed;toilet  -CS      [place] Level/Cues Needed (Transfer Goal 1, OT)  minimum assist (75% or more patient effort)  -CS      Time Frame (Transfer Goal 1, OT)  long term goal (LTG);10 days  -CS      Progress/Outcome (Transfer Goal 1, OT)  goal not met  -CS         Bathing Goal 1 (OT)    Activity/Device (Bathing Goal 1, OT)  bathing skills, all;shower chair  -CS      [place] Level/Cues Needed (Bathing Goal 1, OT)  minimum assist (75% or more patient effort)  -CS      Time Frame (Bathing Goal 1, OT)  long term goal (LTG);10 days  -CS      Progress/Outcomes (Bathing Goal 1, OT)  goal not met  -CS         Dressing Goal 1 (OT)    Activity/Device (Dressing Goal 1, OT)  lower body dressing  -CS      [place]/Cues Needed (Dressing Goal 1, OT)  minimum assist (75% or more patient effort)  -CS      Time Frame (Dressing Goal 1, OT)  long term goal (LTG);by discharge  -CS      Progress/Outcome (Dressing Goal 1, OT)  goal not met  -CS         User Key  (r) = Recorded By, (t) = Taken By, (c) = Cosigned By    Initials Name Provider Type Discipline     Nell Pride OTR/L, DIONISIO Occupational Therapist OT          Outcome Measures     Row Name 11/04/20 1500             How much help from another is currently needed...    Putting on and taking off regular lower body clothing?  2  -TS      Bathing (including washing, rinsing, and drying)  2  -TS      Toileting (which includes using toilet bed pan or urinal)  2  -TS      Putting on and taking off regular upper body clothing  2  -TS      Taking care of personal grooming (such as brushing teeth)  4  -TS      Eating meals  4  -TS      AM-PAC 6 Clicks Score (OT)  16  -TS        User Key  (r) = Recorded By, (t) = Taken By, (c) = Cosigned By    Initials Name Provider Type    TS Jovana Lundberg COTA/L Occupational Therapy Assistant          Timed Therapy Charges  Total Units: 2    Charges  Total Units: 2    Procedure Name Documented Minutes Units Code    HC OT SELF CARE/MGMT/TRAIN EA 15 MIN 28  2    65252 (CPT®)               Documented Minutes  Total Minutes: 28    Therapy Provided Minutes    11088 - OT Self Care/Mgmt Minutes 28                    OT Discharge Summary  Anticipated Discharge Disposition (OT): skilled nursing facility  Reason for Discharge: Discharge from facility  Outcomes Achieved: Refer to plan of care for updates on goals achieved  Discharge Destination: SNF      SAMY Aldrich/L, CNT  11/6/2020

## 2020-11-24 PROBLEM — Z78.9 NON-SMOKER: Status: ACTIVE | Noted: 2020-01-01

## 2020-11-24 NOTE — PROGRESS NOTES
"  Chief complaint:   Chief Complaint   Patient presents with   • Post-op     Donnell is returning for a post op check following a lumbar fusion         Subjective     HPI: This is a 87 y.o. male patient who went to the operating room on 10/28/2020 for a L2-3, 3-4, 4-5 TLIF for right lower extremity pain and weakness.  The patient is here in follow up today for postoperative visit.  The patient has remained at [facility] since leaving the hospital.  He is not complaining of any meaningful back pain or right lower extremity pain.  He does present today in a wheelchair.  He does continue to have significant weakness in his right lower extremity that is more proximal than distal weakness.  He remains in his brace.  He is working with therapy.  He also does have pressure ulcers on his heels that he is working with wound care at this time.        Review of Systems   Musculoskeletal: Positive for gait problem. Negative for back pain.   Neurological: Positive for weakness.         Objective      Vital Signs  /68 (BP Location: Right arm, Patient Position: Sitting)   Ht 188 cm (74\")   Wt 84.4 kg (186 lb)   BMI 23.88 kg/m²     Physical Exam  Constitutional:       Appearance: He is well-developed.   HENT:      Head: Normocephalic.   Eyes:      Pupils: Pupils are equal, round, and reactive to light.   Neck:      Musculoskeletal: Normal range of motion.   Pulmonary:      Effort: Pulmonary effort is normal.   Musculoskeletal: Normal range of motion.   Skin:     General: Skin is warm.   Neurological:      Mental Status: He is alert and oriented to person, place, and time.      GCS: GCS eye subscore is 4. GCS verbal subscore is 5. GCS motor subscore is 6.      Cranial Nerves: No cranial nerve deficit.      Sensory: No sensory deficit.      Gait: Gait abnormal.      Deep Tendon Reflexes: Reflexes are normal and symmetric.      Comments: Dorsiflexion and plantar flexion 5 out of 5 bilaterally    Right " hamstring, quadricep, psoas 2 out of 5.   Psychiatric:         Speech: Speech normal.         Behavior: Behavior normal.         Thought Content: Thought content normal.       Incisions clean dry and intact    Neurologic Exam     Mental Status   Oriented to person, place, and time.   Speech: speech is normal     Cranial Nerves     CN III, IV, VI   Pupils are equal, round, and reactive to light.      Results Review: No new imaging          Assessment/Plan: The patient continues to have weakness in his right lower extremity.  We did discuss with the patient that this is something that will take a long time to improve and continue working with the therapist at this time.  We will send him for set of x-rays of the lumbar spine today and if everything looks good on the x-rays we will discuss letting him come out of the brace.  We will have him see Dr. Perez in 8 to 10 weeks.    Patient is a nonsmoker  The patient's Body mass index is 23.88 kg/m².. BMI is within normal parameters. No follow-up required.     Advance Care Planning   ACP discussion was held with the patient during this visit. Patient does not have an advance directive, information provided.      Diagnoses and all orders for this visit:    1. Spinal stenosis, lumbar region, with neurogenic claudication (Primary)  -     XR Spine Lumbar Complete 4+VW; Future    2. Body mass index (BMI) of 23.0 to 23.9 in adult    3. Non-smoker        I discussed the patients findings and my recommendations with patient  Juan Jose Gilmore, APRN  11/24/20  12:21 CST

## 2020-11-24 NOTE — PATIENT INSTRUCTIONS
Advance Care Planning and Advance Directives     You make decisions on a daily basis - decisions about where you want to live, your career, your home, your life. Perhaps one of the most important decisions you face is your choice for future medical care. Take time to talk with your family and your healthcare team and start planning today.  Advance Care Planning is a process that can help you:  · Understand possible future healthcare decisions in light of your own experiences  · Reflect on those decision in light of your goals and values  · Discuss your decisions with those closest to you and the healthcare professionals that care for you  · Make a plan by creating a document that reflects your wishes    Surrogate Decision Maker  In the event of a medical emergency, which has left you unable to communicate or to make your own decisions, you would need someone to make decisions for you.  It is important to discuss your preferences for medical treatment with this person while you are in good health.     Qualities of a surrogate decision maker:  • Willing to take on this role and responsibility  • Knows what you want for future medical care  • Willing to follow your wishes even if they don't agree with them  • Able to make difficult medical decisions under stressful circumstances    Advance Directives  These are legal documents you can create that will guide your healthcare team and decision maker(s) when needed. These documents can be stored in the electronic medical record.    · Living Will - a legal document to guide your care if you have a terminal condition or a serious illness and are unable to communicate. States vary by statute in document names/types, but most forms may include one or more of the following:        -  Directions regarding life-prolonging treatments        -  Directions regarding artificially provided nutrition/hydration        -  Choosing a healthcare decision maker        -  Direction  regarding organ/tissue donation    · Durable Power of  for Healthcare - this document names an -in-fact to make medical decisions for you, but it may also allow this person to make personal and financial decisions for you. Please seek the advice of an  if you need this type of document.    **Advance Directives are not required and no one may discriminate against you if you do not sign one.    Medical Orders  Many states allow specific forms/orders signed by your physician to record your wishes for medical treatment in your current state of health. This form, signed in personal communication with your physician, addresses resuscitation and other medical interventions that you may or may not want.      For more information or to schedule a time with a [facility] Advance Care Planning Facilitator contact: [email or web address] or call [phone] and someone will contact you directly.

## 2020-11-25 NOTE — TELEPHONE ENCOUNTER
X-rays look good he can come out of the brace on December 12     Juan Jose reviewed patient's recent x-ray, and results were called to [facility], and give to May his nurse, she understood that the x-ray looks good and he may come out of his brace on 12/12/2020

## 2020-12-11 NOTE — PROGRESS NOTES
Single Chamber Ventricular Pacemaker Evaluation Report  IN OFFICE INTERROGATION    July 1, 2020    Primary Cardiologist: Mike  : Guidant Model: Advantio K062  Implant date: 11/5/2014    Reason for evaluation: routine  Indication for pacemaker: sick sinus syndrome    Measurements  Ventricular sensing - R wave: 22.3 mV  Ventricular threshold: 1 V @ 0.4 ms  Ventricular lead impedance: 657 ohms      Diagnostic Data  Paced: 97 %  Other: 5 ventricular high rates, longest duration 20 seconds, rates 140-165 bpm  Battery status: satisfactory    Estimated 5  Years remaining      Final Parameters  Mode: VVIR     Lower rate: 70 bpm    Ventricular - Amplitude: 1.5 V Pulse width: 0.4 ms Sensitivity: 2.5 mV   Changes made: No changes made.  Conclusions: normal pacemaker function, stable pacing and sensing thresholds and adequate battery reserve    Follow up: 6 months in office

## 2020-12-16 NOTE — TELEPHONE ENCOUNTER
Linda with [facility] called to report the patient's PT/INR was drawn today and is 42.1 & 4.33.  The practitioner wrote an order to hold the patient's Xarelto 20 mg daily x 2 days and contact cardiology for further instructions on dosing.  Please advise.

## 2020-12-16 NOTE — TELEPHONE ENCOUNTER
Spoke with Linda. Advise not to check INR. She denies any signs of bleedings, stable hemoglobin and hematocrit. My advice would be not to check INRs while on NOAC. Continue Xarelto and monitor for signs of bleeding. Offered to speak to provider their if needed.

## 2020-12-18 PROBLEM — N39.0 URINARY TRACT INFECTION WITH HEMATURIA: Status: ACTIVE | Noted: 2020-01-01

## 2020-12-18 PROBLEM — D62 ACUTE BLOOD LOSS ANEMIA: Status: ACTIVE | Noted: 2020-01-01

## 2020-12-18 PROBLEM — S20.211A: Status: ACTIVE | Noted: 2020-01-01

## 2020-12-18 PROBLEM — I50.23 ACUTE ON CHRONIC SYSTOLIC CHF (CONGESTIVE HEART FAILURE) (HCC): Status: ACTIVE | Noted: 2020-01-01

## 2020-12-18 PROBLEM — J81.0 ACUTE PULMONARY EDEMA (HCC): Status: ACTIVE | Noted: 2020-01-01

## 2020-12-18 PROBLEM — E87.6 HYPOKALEMIA: Status: ACTIVE | Noted: 2020-01-01

## 2020-12-18 PROBLEM — R31.9 URINARY TRACT INFECTION WITH HEMATURIA: Status: ACTIVE | Noted: 2020-01-01

## 2020-12-19 PROBLEM — J81.0 ACUTE PULMONARY EDEMA (HCC): Status: RESOLVED | Noted: 2020-01-01 | Resolved: 2020-01-01

## 2020-12-19 PROBLEM — G93.41 METABOLIC ENCEPHALOPATHY: Status: ACTIVE | Noted: 2020-01-01

## 2020-12-20 PROBLEM — N39.0 E. COLI UTI (URINARY TRACT INFECTION): Status: ACTIVE | Noted: 2020-01-01

## 2020-12-20 PROBLEM — B96.20 E. COLI UTI (URINARY TRACT INFECTION): Status: ACTIVE | Noted: 2020-01-01

## 2020-12-23 NOTE — TELEPHONE ENCOUNTER
“Please be informed that patient has passed. Patient has been marked  in the system. The date of death is: 2020    Caller: Esperanza Saleh    Relationship: Emergency Contact    Best call back number: [phone]

## (undated) DEVICE — 4-PORT MANIFOLD: Brand: NEPTUNE 2

## (undated) DEVICE — CONN FLX BREATHE CIRCT

## (undated) DEVICE — SPK10277 JACKSON/PRO-AXIS KIT: Brand: SPK10277 JACKSON/PRO-AXIS KIT

## (undated) DEVICE — ELECTRD BLD EZ CLN MOD XLNG 2.75IN

## (undated) DEVICE — SHEET,DRAPE,53X77,STERILE: Brand: MEDLINE

## (undated) DEVICE — CLTH CLENS READYCLEANSE PERI CARE PK/5

## (undated) DEVICE — CATH IV ANGIO FEP 12G 3IN LTBLU 10PK

## (undated) DEVICE — GLV SURG BIOGEL LTX PF 6 1/2

## (undated) DEVICE — DRP TABL BK STERILEZ ZFLD

## (undated) DEVICE — SUT MNCRYL 4/0 PS2 27IN UD MCP426H

## (undated) DEVICE — TOOL 14MH30 LEGEND 14CM 3MM: Brand: MIDAS REX ™

## (undated) DEVICE — 3M™ STERI-STRIP™ REINFORCED ADHESIVE SKIN CLOSURES, R1547, 1/2 IN X 4 IN (12 MM X 100 MM), 6 STRIPS/ENVELOPE: Brand: 3M™ STERI-STRIP™

## (undated) DEVICE — PK TURNOVER RM ADV

## (undated) DEVICE — DRSNG TELFA PAD NONADH STR 1S 3X8IN

## (undated) DEVICE — PK SPINE POST 30

## (undated) DEVICE — SPHR MARKR STEALTH STATION

## (undated) DEVICE — SUT VIC 0 MO4 CR8 18IN VCP701D

## (undated) DEVICE — GLV SURG BIOGEL LTX PF 8

## (undated) DEVICE — ELECTRD BLD EZ CLN MOD 4IN

## (undated) DEVICE — VAGINAL PREP TRAY: Brand: MEDLINE INDUSTRIES, INC.

## (undated) DEVICE — PROXIMATE RH ROTATING HEAD SKIN STAPLERS (35 REGULAR) CONTAINS 35 STAINLESS STEEL STAPLES: Brand: PROXIMATE

## (undated) DEVICE — DISPOSABLE DRAPE, STERILE, FOR A CDS-3072 6 FOOT TABLE: Brand: PEDIGO PRODUCTS, INC.

## (undated) DEVICE — GLV SURG DERMASSURE GRN LF PF 8.0

## (undated) DEVICE — TOTAL TRAY, 16FR 10ML SIL FOLEY, URN: Brand: MEDLINE

## (undated) DEVICE — KNIF BAYO METRX DISECT

## (undated) DEVICE — ANTIBACTERIAL VIOLET BRAIDED (POLYGLACTIN 910), SYNTHETIC ABSORBABLE SUTURE: Brand: COATED VICRYL